# Patient Record
Sex: FEMALE | Race: WHITE | Employment: UNEMPLOYED | URBAN - METROPOLITAN AREA
[De-identification: names, ages, dates, MRNs, and addresses within clinical notes are randomized per-mention and may not be internally consistent; named-entity substitution may affect disease eponyms.]

---

## 2017-08-10 ENCOUNTER — OFFICE VISIT (OUTPATIENT)
Dept: INTERNAL MEDICINE CLINIC | Age: 52
End: 2017-08-10

## 2017-08-10 VITALS
DIASTOLIC BLOOD PRESSURE: 60 MMHG | BODY MASS INDEX: 21.33 KG/M2 | HEART RATE: 72 BPM | HEIGHT: 65 IN | WEIGHT: 128 LBS | SYSTOLIC BLOOD PRESSURE: 98 MMHG

## 2017-08-10 DIAGNOSIS — H00.025 HORDEOLUM INTERNUM OF LEFT LOWER EYELID: Primary | ICD-10-CM

## 2017-08-10 PROBLEM — J03.90 LINGUAL TONSILLITIS: Status: ACTIVE | Noted: 2017-08-10

## 2017-08-10 PROBLEM — J45.20 MILD INTERMITTENT ASTHMA WITHOUT COMPLICATION: Status: ACTIVE | Noted: 2017-08-10

## 2017-08-10 PROBLEM — L70.0 ACNE VULGARIS: Status: ACTIVE | Noted: 2017-08-10

## 2017-08-10 PROBLEM — F17.210 CIGARETTE NICOTINE DEPENDENCE WITHOUT COMPLICATION: Status: ACTIVE | Noted: 2017-08-10

## 2017-08-10 PROBLEM — M79.7 FIBROMYALGIA: Status: ACTIVE | Noted: 2017-08-10

## 2017-08-10 PROBLEM — E78.00 HYPERCHOLESTEROLEMIA: Status: ACTIVE | Noted: 2017-08-10

## 2017-08-10 PROBLEM — K21.9 GERD WITHOUT ESOPHAGITIS: Status: ACTIVE | Noted: 2017-08-10

## 2017-08-10 PROBLEM — F41.0 SEVERE ANXIETY WITH PANIC: Status: ACTIVE | Noted: 2017-08-10

## 2017-08-10 PROBLEM — Z78.0 MENOPAUSE: Status: ACTIVE | Noted: 2017-08-10

## 2017-08-10 RX ORDER — AZITHROMYCIN 250 MG/1
250 TABLET, FILM COATED ORAL SEE ADMIN INSTRUCTIONS
Qty: 6 TAB | Refills: 0 | Status: SHIPPED | OUTPATIENT
Start: 2017-08-10 | End: 2017-10-02 | Stop reason: ALTCHOICE

## 2017-08-10 RX ORDER — GLUCOSAMINE/CHONDR SU A SOD 750-600 MG
TABLET ORAL
COMMUNITY
End: 2020-12-10 | Stop reason: ALTCHOICE

## 2017-08-10 RX ORDER — PRAVASTATIN SODIUM 20 MG/1
20 TABLET ORAL
COMMUNITY
End: 2018-01-29 | Stop reason: SDUPTHER

## 2017-08-10 RX ORDER — ALBUTEROL SULFATE 90 UG/1
AEROSOL, METERED RESPIRATORY (INHALATION)
COMMUNITY
End: 2018-08-14 | Stop reason: SDUPTHER

## 2017-08-10 RX ORDER — ALPRAZOLAM 0.5 MG/1
0.5 TABLET ORAL
COMMUNITY
End: 2017-08-21 | Stop reason: SDUPTHER

## 2017-08-10 RX ORDER — PANTOPRAZOLE SODIUM 40 MG/1
40 TABLET, DELAYED RELEASE ORAL DAILY
COMMUNITY
End: 2019-11-06 | Stop reason: ALTCHOICE

## 2017-08-10 NOTE — PROGRESS NOTES
This note will not be viewable in [address] Subjective: The patient presents to the office today with complaints of left lower eyelid pain, swelling, redness. This is been ongoing for 8 days. She denies any trauma to the eye. She denies any redness of the sclera or eye drainage. She denies blurred vision. She denies any right-sided symptoms. Past Medical History:   Diagnosis Date    Acne vulgaris 8/10/2017    Cigarette nicotine dependence without complication 9/75/7784    Fibromyalgia 8/10/2017    GERD without esophagitis 8/10/2017    Hypercholesterolemia 8/10/2017    Lingual tonsillitis 8/10/2017    Menopause 8/10/2017    Mild intermittent asthma without complication 3/38/7090    Severe anxiety with panic 8/10/2017     Past Surgical History:   Procedure Laterality Date    IMPLANT BREAST SILICONE/EQ Bilateral 1893     Allergies   Allergen Reactions    Pcn [Penicillins] Hives    Sulfa (Sulfonamide Antibiotics) Hives     Current Outpatient Prescriptions   Medication Sig Dispense Refill    pantoprazole (PROTONIX) 40 mg tablet Take 40 mg by mouth daily.  pravastatin (PRAVACHOL) 20 mg tablet Take 20 mg by mouth nightly.  ALPRAZolam (XANAX) 0.5 mg tablet Take 0.5 mg by mouth three (3) times daily as needed for Anxiety.  albuterol (PROAIR HFA) 90 mcg/actuation inhaler Take  by inhalation.  Biotin 2,500 mcg cap Take  by mouth.  azithromycin (ZITHROMAX) 250 mg tablet Take 1 Tab by mouth See Admin Instructions.  6 Tab 0     Social History     Social History    Marital status:      Spouse name: N/A    Number of children: N/A    Years of education: N/A     Social History Main Topics    Smoking status: Current Every Day Smoker     Packs/day: 0.50    Smokeless tobacco: Never Used    Alcohol use 7.2 oz/week     12 Cans of beer per week    Drug use: No    Sexual activity: Not Asked     Other Topics Concern    None     Social History Narrative     Family History Problem Relation Age of Onset    Cancer Mother      skin, throat    Arthritis-osteo Mother     Anxiety Mother     No Known Problems Father        Review of Systems:  GEN: no weight loss, weight gain, fatigue or night sweats  HEENT: Left lower eyelid swelling, redness and pain. ROS otherwise negative      Objective:     Visit Vitals    BP 98/60 (BP 1 Location: Right arm, BP Patient Position: Sitting)    Pulse 72    Ht 5' 4.5\" (1.638 m)    Wt 128 lb (58.1 kg)    BMI 21.63 kg/m2     Body mass index is 21.63 kg/(m^2). General:   alert, cooperative and no distress   Eyes: conjunctivae/sclerae clear. PERRL, EOM's intact. Left lower lid swollen and red. Internal stye present with lower eyelid eversion. Physical exam otherwise negative         Assessment/Plan:     Diagnoses and all orders for this visit:    Hordeolum internum of left lower eyelid    Other orders  -     azithromycin (ZITHROMAX) 250 mg tablet; Take 1 Tab by mouth See Admin Instructions. , Normal, Disp-6 Tab, R-0        Other instructions: The patient will start warm compresses to the lower eyelid 4 times a day for 5-10 minutes. She is allergic to penicillin and we will avoid cephalosporins as well. Have given her a Z-William to treat the above problem. Refer to ophthalmology should she not improve over the next 5-10 days. Follow-up Disposition:  Return if symptoms worsen or fail to improve.     Matteo Juarez MD

## 2017-08-10 NOTE — PATIENT INSTRUCTIONS
Styes and Chalazia: Care Instructions  Your Care Instructions    Styes and chalazia (say \"rpa-DBG-gyi-uh\") are both conditions that can cause swelling of the eyelid. A stye is an infection in the root of an eyelash. The infection causes a tender red lump on the edge of the eyelid. The infection can spread until the whole eyelid becomes red and inflamed. Styes usually break open, and a tiny amount of pus drains. They usually clear up on their own in about a week, but they sometimes need treatment with antibiotics. A chalazion is a lump or cyst in the eyelid (chalazion is singular; chalazia is plural). It is caused by swelling and inflammation of deep oil glands inside the eyelid. Chalazia are usually not infected. They can take a few months to heal.  If a chalazion becomes more swollen and painful or does not go away, you may need to have it drained by your doctor. Follow-up care is a key part of your treatment and safety. Be sure to make and go to all appointments, and call your doctor if you are having problems. It's also a good idea to know your test results and keep a list of the medicines you take. How can you care for yourself at home? · Do not rub your eyes. Do not squeeze or try to open a stye or chalazion. · To help a stye or chalazion heal faster:  ¨ Put a warm, moist compress on your eye for 5 to 10 minutes, 3 to 6 times a day. Heat often brings a stye to a point where it drains on its own. Keep in mind that warm compresses will often increase swelling a little at first.  ¨ Do not use hot water or heat a wet cloth in a microwave oven. The compress may get too hot and can burn the eyelid. · Always wash your hands before and after you use a compress or touch your eyes. · If the doctor gave you antibiotic drops or ointment, use the medicine exactly as directed. Use the medicine for as long as instructed, even if your eye starts to feel better.   · To put in eyedrops or ointment:  ¨ Tilt your head back, and pull your lower eyelid down with one finger. ¨ Drop or squirt the medicine inside the lower lid. ¨ Close your eye for 30 to 60 seconds to let the drops or ointment move around. ¨ Do not touch the ointment or dropper tip to your eyelashes or any other surface. · Do not wear eye makeup or contact lenses until the stye or chalazion heals. · Do not share towels, pillows, or washcloths while you have a stye. When should you call for help? Call your doctor now or seek immediate medical care if:  · You have pain in your eye. · You have a change in vision or loss of vision. · Redness and swelling get much worse. Watch closely for changes in your health, and be sure to contact your doctor if:  · Your stye does not get better in 1 week. · Your chalazion does not start to get better after several weeks. Where can you learn more? Go to http://mishel-orville.info/. Enter V988 in the search box to learn more about \"Styes and Chalazia: Care Instructions. \"  Current as of: March 3, 2017  Content Version: 11.3  © 5913-4537 [employer]. Care instructions adapted under license by [employer] (which disclaims liability or warranty for this information). If you have questions about a medical condition or this instruction, always ask your healthcare professional. [address] any warranty or liability for your use of this information.

## 2017-08-10 NOTE — PROGRESS NOTES
Margot Liz is a 47 y.o. female presenting for Eye Problem and Ear Fullness  . 1. Have you been to the ER, urgent care clinic since your last visit? Hospitalized since your last visit? No    2. Have you seen or consulted any other health care providers outside of the [address] [place] [address] since your last visit? Include any pap smears or colon screening. No    No flowsheet data found. No flowsheet data found. PHQ over the last two weeks 8/10/2017   Little interest or pleasure in doing things Several days   Feeling down, depressed or hopeless Several days   Total Score PHQ 2 2       There are no discontinued medications.

## 2017-08-10 NOTE — MR AVS SNAPSHOT
Visit Information Date & Time Provider Department Dept. Phone Encounter #  
 8/10/2017  2:50 PM Deepak Dailey MD [facility] [id number] Follow-up Instructions Return if symptoms worsen or fail to improve. Upcoming Health Maintenance Date Due Pneumococcal 19-64 Medium Risk (1 of 1 - PPSV23) 4/5/1985 DTaP/Tdap/Td series (1 - Tdap) 4/5/1987 PAP AKA CERVICAL CYTOLOGY 4/5/1987 FOBT Q 1 YEAR AGE 50-75 4/5/2016 INFLUENZA AGE 9 TO ADULT 8/1/2017 BREAST CANCER SCRN MAMMOGRAM 10/24/2018 Allergies as of 8/10/2017  Review Complete On: 8/10/2017 By: Deepak Dailey MD  
  
 Severity Noted Reaction Type Reactions Pcn [Penicillins]  08/10/2017    Hives Sulfa (Sulfonamide Antibiotics)  08/10/2017    Hives Current Immunizations  Never Reviewed No immunizations on file. Not reviewed this visit You Were Diagnosed With   
  
 Codes Comments Hordeolum internum of left lower eyelid    -  Primary ICD-10-CM: H00.025 ICD-9-CM: 373.12 Vitals BP Pulse Height(growth percentile) Weight(growth percentile) BMI Smoking Status 98/60 (BP 1 Location: Right arm, BP Patient Position: Sitting) 72 5' 4.5\" (1.638 m) 128 lb (58.1 kg) 21.63 kg/m2 Current Every Day Smoker BMI and BSA Data Body Mass Index Body Surface Area  
 21.63 kg/m 2 1.63 m 2 Preferred Pharmacy Pharmacy Name Phone [facility] [phone] Your Updated Medication List  
  
   
This list is accurate as of: 8/10/17  3:28 PM.  Always use your most recent med list.  
  
  
  
  
 azithromycin 250 mg tablet Commonly known as:  Brendolyn Fredy Take 1 Tab by mouth See Admin Instructions. Biotin 2,500 mcg Cap Take  by mouth.  
  
 pravastatin 20 mg tablet Commonly known as:  PRAVACHOL Take 20 mg by mouth nightly. PROAIR HFA 90 mcg/actuation inhaler Generic drug:  albuterol Take  by inhalation. PROTONIX 40 mg tablet Generic drug:  pantoprazole Take 40 mg by mouth daily. XANAX 0.5 mg tablet Generic drug:  ALPRAZolam  
Take 0.5 mg by mouth three (3) times daily as needed for Anxiety. Prescriptions Sent to Pharmacy Refills  
 azithromycin (ZITHROMAX) 250 mg tablet 0 Sig: Take 1 Tab by mouth See Admin Instructions. Class: Normal  
 Pharmacy: [facility] #: [phone] Route: Oral  
  
Follow-up Instructions Return if symptoms worsen or fail to improve. Patient Instructions Styes and Chalazia: Care Instructions Your Care Instructions Styes and chalazia (say \"ezd-CCD-pfd-\") are both conditions that can cause swelling of the eyelid. A stye is an infection in the root of an eyelash. The infection causes a tender red lump on the edge of the eyelid. The infection can spread until the whole eyelid becomes red and inflamed. Styes usually break open, and a tiny amount of pus drains. They usually clear up on their own in about a week, but they sometimes need treatment with antibiotics. A chalazion is a lump or cyst in the eyelid (chalazion is singular; chalazia is plural). It is caused by swelling and inflammation of deep oil glands inside the eyelid. Chalazia are usually not infected. They can take a few months to heal. 
If a chalazion becomes more swollen and painful or does not go away, you may need to have it drained by your doctor. Follow-up care is a key part of your treatment and safety. Be sure to make and go to all appointments, and call your doctor if you are having problems. It's also a good idea to know your test results and keep a list of the medicines you take. How can you care for yourself at home? · Do not rub your eyes. Do not squeeze or try to open a stye or chalazion. · To help a stye or chalazion heal faster: ¨ Put a warm, moist compress on your eye for 5 to 10 minutes, 3 to 6 times a day. Heat often brings a stye to a point where it drains on its own. Keep in mind that warm compresses will often increase swelling a little at first. 
¨ Do not use hot water or heat a wet cloth in a microwave oven. The compress may get too hot and can burn the eyelid. · Always wash your hands before and after you use a compress or touch your eyes. · If the doctor gave you antibiotic drops or ointment, use the medicine exactly as directed. Use the medicine for as long as instructed, even if your eye starts to feel better. · To put in eyedrops or ointment: ¨ Tilt your head back, and pull your lower eyelid down with one finger. ¨ Drop or squirt the medicine inside the lower lid. ¨ Close your eye for 30 to 60 seconds to let the drops or ointment move around. ¨ Do not touch the ointment or dropper tip to your eyelashes or any other surface. · Do not wear eye makeup or contact lenses until the stye or chalazion heals. · Do not share towels, pillows, or washcloths while you have a stye. When should you call for help? Call your doctor now or seek immediate medical care if: 
· You have pain in your eye. · You have a change in vision or loss of vision. · Redness and swelling get much worse. Watch closely for changes in your health, and be sure to contact your doctor if: 
· Your stye does not get better in 1 week. · Your chalazion does not start to get better after several weeks. Where can you learn more? Go to http://mishel-orville.info/. Enter I392 in the search box to learn more about \"Styes and Chalazia: Care Instructions. \" Current as of: March 3, 2017 Content Version: 11.3 © 0501-3516 [employer].  Care instructions adapted under license by [employer] (which disclaims liability or warranty for this information). If you have questions about a medical condition or this instruction, always ask your healthcare professional. [address] any warranty or liability for your use of this information. Introducing [facility]! [facility] introduces ClearMomentum patient portal. Now you can access parts of your medical record, email your doctor's office, and request medication refills online. 1. In your internet browser, go to [email or web address] 2. Click on the First Time User? Click Here link in the Sign In box. You will see the New Member Sign Up page. 3. Enter your ClearMomentum Access Code exactly as it appears below. You will not need to use this code after youve completed the sign-up process. If you do not sign up before the expiration date, you must request a new code. · ClearMomentum Access Code: [email or web address] Expires: 11/8/2017  2:48 PM 
 
4. Enter the last four digits of your Social Security Number (xxxx) and Date of Birth (mm/dd/yyyy) as indicated and click Submit. You will be taken to the next sign-up page. 5. Create a ClearMomentum ID. This will be your ClearMomentum login ID and cannot be changed, so think of one that is secure and easy to remember. 6. Create a ClearMomentum password. You can change your password at any time. 7. Enter your Password Reset Question and Answer. This can be used at a later time if you forget your password. 8. Enter your e-mail address. You will receive e-mail notification when new information is available in [address] 9. Click Sign Up. You can now view and download portions of your medical record. 10. Click the Download Summary menu link to download a portable copy of your medical information. If you have questions, please visit the Frequently Asked Questions section of the ClearMomentum website. Remember, ClearMomentum is NOT to be used for urgent needs. For medical emergencies, dial 911. Now available from your iPhone and Android! Please provide this summary of care documentation to your next provider. Your primary care clinician is listed as ELFEGO Vidal. If you have any questions after today's visit, please call [phone].

## 2017-08-22 RX ORDER — ALPRAZOLAM 0.5 MG/1
TABLET ORAL
Qty: 90 TAB | Refills: 0 | Status: SHIPPED | OUTPATIENT
Start: 2017-08-22 | End: 2017-10-02 | Stop reason: SDUPTHER

## 2017-08-22 NOTE — TELEPHONE ENCOUNTER
Requested Prescriptions     Pending Prescriptions Disp Refills    ALPRAZolam (XANAX) 0.5 mg tablet [Pharmacy Med Name: ALPRAZOLAM 0.5MG] 90 Tab 0     Sig: TAKE 1 TABLET BY MOUTH THREE TIMES DAILY AS NEEDED       Last Refill: 7/17/17  Last visit:5/31/17

## 2017-10-02 ENCOUNTER — OFFICE VISIT (OUTPATIENT)
Dept: INTERNAL MEDICINE CLINIC | Age: 52
End: 2017-10-02

## 2017-10-02 VITALS
SYSTOLIC BLOOD PRESSURE: 116 MMHG | TEMPERATURE: 97.8 F | HEIGHT: 65 IN | WEIGHT: 132.4 LBS | BODY MASS INDEX: 22.06 KG/M2 | DIASTOLIC BLOOD PRESSURE: 78 MMHG

## 2017-10-02 DIAGNOSIS — B02.9 HERPES ZOSTER WITHOUT COMPLICATION: Primary | ICD-10-CM

## 2017-10-02 DIAGNOSIS — F41.0 SEVERE ANXIETY WITH PANIC: ICD-10-CM

## 2017-10-02 PROBLEM — F41.9 ANXIETY: Status: ACTIVE | Noted: 2017-10-02

## 2017-10-02 RX ORDER — ALPRAZOLAM 0.5 MG/1
0.5 TABLET ORAL
Qty: 90 TAB | Refills: 0 | Status: SHIPPED | OUTPATIENT
Start: 2017-10-02 | End: 2017-11-15 | Stop reason: SDUPTHER

## 2017-10-02 RX ORDER — DOXYCYCLINE 100 MG/1
100 CAPSULE ORAL 2 TIMES DAILY
COMMUNITY
End: 2017-10-02 | Stop reason: ALTCHOICE

## 2017-10-02 RX ORDER — FAMCICLOVIR 500 MG/1
500 TABLET ORAL 3 TIMES DAILY
Qty: 21 TAB | Refills: 0 | Status: SHIPPED | OUTPATIENT
Start: 2017-10-02 | End: 2017-10-09

## 2017-10-02 RX ORDER — PREDNISONE 10 MG/1
TABLET ORAL
Qty: 30 TAB | Refills: 0 | Status: SHIPPED | OUTPATIENT
Start: 2017-10-02 | End: 2018-04-23 | Stop reason: ALTCHOICE

## 2017-10-02 NOTE — PROGRESS NOTES
This note will not be viewable in [address] Subjective:     Ms. Verna Martinez presents to the office today with complaints of a painful rash which is broken out over her right scapula. Pain began several days before the rash occurred. She has had no fevers no chills or other constitutional symptoms. She notes radicular discomfort that occurs through the rash. Past Medical History:   Diagnosis Date    Acne vulgaris 8/10/2017    Anxiety 10/2/2017    Cigarette nicotine dependence without complication 8/63/9477    Fibromyalgia 8/10/2017    GERD without esophagitis 8/10/2017    Hypercholesterolemia 8/10/2017    Lingual tonsillitis 8/10/2017    Menopause 8/10/2017    Mild intermittent asthma without complication 8/80/7587    Severe anxiety with panic 8/10/2017     Past Surgical History:   Procedure Laterality Date    IMPLANT BREAST SILICONE/EQ Bilateral 5980     Allergies   Allergen Reactions    Pcn [Penicillins] Hives    Sulfa (Sulfonamide Antibiotics) Hives     Current Outpatient Prescriptions   Medication Sig Dispense Refill    ERGOCALCIFEROL, VITAMIN D2, (VITAMIN D2 PO) Take  by mouth.  famciclovir (FAMVIR) 500 mg tablet Take 1 Tab by mouth three (3) times daily for 7 days. 21 Tab 0    predniSONE (DELTASONE) 10 mg tablet 5 tablets day for days 1 through 4, then 4 tablets on day 5, then 3 tablets on day 6, then 2 tablets on day 7, then 1 tablet on day 8. 30 Tab 0    ALPRAZolam (XANAX) 0.5 mg tablet Take 1 Tab by mouth three (3) times daily as needed for Anxiety. Max Daily Amount: 1.5 mg. 90 Tab 0    pantoprazole (PROTONIX) 40 mg tablet Take 40 mg by mouth daily.  pravastatin (PRAVACHOL) 20 mg tablet Take 20 mg by mouth nightly.  albuterol (PROAIR HFA) 90 mcg/actuation inhaler Take  by inhalation.  Biotin 2,500 mcg cap Take  by mouth.        Social History     Social History    Marital status:      Spouse name: N/A    Number of children: N/A    Years of education: N/A Social History Main Topics    Smoking status: Current Every Day Smoker     Packs/day: 0.50    Smokeless tobacco: Never Used    Alcohol use 7.2 oz/week     12 Cans of beer per week    Drug use: No    Sexual activity: Not Asked     Other Topics Concern    None     Social History Narrative     Family History   Problem Relation Age of Onset    Cancer Mother      skin, throat    Arthritis-osteo Mother     Anxiety Mother     No Known Problems Father        Review of Systems:  GEN: no weight loss, weight gain, fatigue or night sweats  CV: no PND, orthopnea, or palpitations  Resp: no dyspnea on exertion, no cough  Abd: no nausea, vomiting or diarrhea  EXT: denies edema, claudication  Derm: Painful rash over right scapula only. No fever or other constitutional symptoms present  Neurological ROS: no TIA or stroke symptoms  ROS otherwise negative      Objective:     Visit Vitals    /78 (BP 1 Location: Right arm, BP Patient Position: Sitting)    Temp 97.8 °F (36.6 °C) (Oral)    Ht 5' 4.5\" (1.638 m)    Wt 132 lb 6.4 oz (60.1 kg)    BMI 22.38 kg/m2     Body mass index is 22.38 kg/(m^2). General:   alert, cooperative and no distress   Derm: No edema or cyanosis. Herpetic crop present over right scapula with no rash extending to the right flank chest or breast areas. Vesicles present. No signs of cellulitis. Neuro: ..alert, oriented x3,speech normal in context and clarity, cranial nerves II-XII intact,motor strength: full proximally and distally,gait: normal  reflexes: full and symmetric     Physical exam otherwise negative         Assessment/Plan:     Diagnoses and all orders for this visit:    Herpes zoster without complication    Severe anxiety with panic  -     ALPRAZolam (XANAX) 0.5 mg tablet; Take 1 Tab by mouth three (3) times daily as needed for Anxiety. Max Daily Amount: 1.5 mg., PrintGeneric For:XANAX  0.5MGDisp-90 Tab, R-0    Other orders  -     famciclovir (FAMVIR) 500 mg tablet;  Take 1 Tab by mouth three (3) times daily for 7 days. , Normal, Disp-21 Tab, R-0  -     predniSONE (DELTASONE) 10 mg tablet; 5 tablets day for days 1 through 4, then 4 tablets on day 5, then 3 tablets on day 6, then 2 tablets on day 7, then 1 tablet on day 8., Normal, Disp-30 Tab, R-0        Other instructions:   Start steroid taper and antiviral medication    Offered to prescribe gabapentin for help with herpetic pain but she declines. Have refilled her Xanax today for her severe anxiety and panic. Follow-up if there is any worsening    Follow-up Disposition:  Return if symptoms worsen or fail to improve.     Fred Wang MD

## 2017-10-02 NOTE — MR AVS SNAPSHOT
Visit Information Date & Time Provider Department Dept. Phone Encounter #  
 10/2/2017  1:00 PM Mark Slater MD [facility] [id number] Follow-up Instructions Return if symptoms worsen or fail to improve. Upcoming Health Maintenance Date Due Pneumococcal 19-64 Medium Risk (1 of 1 - PPSV23) 4/5/1985 DTaP/Tdap/Td series (1 - Tdap) 4/5/1987 PAP AKA CERVICAL CYTOLOGY 4/5/1987 FOBT Q 1 YEAR AGE 50-75 4/5/2016 INFLUENZA AGE 9 TO ADULT 8/1/2017 BREAST CANCER SCRN MAMMOGRAM 10/24/2018 Allergies as of 10/2/2017  Review Complete On: 10/2/2017 By: Mark Slater MD  
  
 Severity Noted Reaction Type Reactions Pcn [Penicillins]  08/10/2017    Hives Sulfa (Sulfonamide Antibiotics)  08/10/2017    Hives Current Immunizations  Never Reviewed No immunizations on file. Not reviewed this visit You Were Diagnosed With   
  
 Codes Comments Herpes zoster without complication    -  Primary ICD-10-CM: B02.9 ICD-9-CM: 053.9 Severe anxiety with panic     ICD-10-CM: F41.0 ICD-9-CM: 300.01 Vitals BP Temp Height(growth percentile) 116/78 (BP 1 Location: Right arm, BP Patient Position: Sitting) 97.8 °F (36.6 °C) (Oral) 5' 4.5\" (1.638 m) Weight(growth percentile) BMI Smoking Status 132 lb 6.4 oz (60.1 kg) 22.38 kg/m2 Current Every Day Smoker BMI and BSA Data Body Mass Index Body Surface Area  
 22.38 kg/m 2 1.65 m 2 Preferred Pharmacy Pharmacy Name Phone [facility] [phone] Your Updated Medication List  
  
   
This list is accurate as of: 10/2/17  1:13 PM.  Always use your most recent med list.  
  
  
  
  
 ALPRAZolam 0.5 mg tablet Commonly known as:  Cecillia Mariangel Take 1 Tab by mouth three (3) times daily as needed for Anxiety. Max Daily Amount: 1.5 mg.  
  
 Biotin 2,500 mcg Cap Take  by mouth. famciclovir 500 mg tablet Commonly known as:  [facility] Take 1 Tab by mouth three (3) times daily for 7 days. pravastatin 20 mg tablet Commonly known as:  PRAVACHOL Take 20 mg by mouth nightly. predniSONE 10 mg tablet Commonly known as:  DELTASONE  
5 tablets day for days 1 through 4, then 4 tablets on day 5, then 3 tablets on day 6, then 2 tablets on day 7, then 1 tablet on day 8. PROAIR HFA 90 mcg/actuation inhaler Generic drug:  albuterol Take  by inhalation. PROTONIX 40 mg tablet Generic drug:  pantoprazole Take 40 mg by mouth daily. VITAMIN D2 PO Take  by mouth. Prescriptions Printed Refills ALPRAZolam (XANAX) 0.5 mg tablet 0 Sig: Take 1 Tab by mouth three (3) times daily as needed for Anxiety. Max Daily Amount: 1.5 mg.  
 Class: Print Route: Oral  
  
Prescriptions Sent to Pharmacy Refills  
 famciclovir (FAMVIR) 500 mg tablet 0 Sig: Take 1 Tab by mouth three (3) times daily for 7 days. Class: Normal  
 Pharmacy: [facility]  Ph #: [phone] Route: Oral  
 predniSONE (DELTASONE) 10 mg tablet 0 Si tablets day for days 1 through 4, then 4 tablets on day 5, then 3 tablets on day 6, then 2 tablets on day 7, then 1 tablet on day 8. Class: Normal  
 [facility]acy: [facility]  Ph #: [phone] Follow-up Instructions Return if symptoms worsen or fail to improve. Patient Instructions Shingles: Care Instructions Your Care Instructions Shingles (herpes zoster) causes pain and a blistered rash. The rash can appear anywhere on the body but will be on only one side of the body, the left or right. It will be in a band, a strip, or a small area. The pain can be very severe.  Shingles can also cause tingling or itching in the area of the rash. The blisters scab over after a few days and heal in 2 to 4 weeks. Medicines can help you feel better and may help prevent more serious problems caused by shingles. Shingles is caused by the same virus that causes chickenpox. When you have chickenpox, the virus gets into your nerve roots and stays there (becomes dormant) long after you get over the chickenpox. If the virus becomes active again, it can cause shingles. Follow-up care is a key part of your treatment and safety. Be sure to make and go to all appointments, and call your doctor if you are having problems. It's also a good idea to know your test results and keep a list of the medicines you take. How can you care for yourself at home? · Be safe with medicines. Take your medicines exactly as prescribed. Call your doctor if you think you are having a problem with your medicine. Antiviral medicine helps you get better faster. · Try not to scratch or pick at the blisters. They will crust over and fall off on their own if you leave them alone. · Put cool, wet cloths on the area to relieve pain and itching. You can also use calamine lotion. Try not to use so much lotion that it cakes and is hard to get off. · Put cornstarch or baking soda on the sores to help dry them out so they heal faster. · Do not use thick ointment, such as petroleum jelly, on the sores. This will keep them from drying and healing. · To help remove loose crusts, soak them in tap water. This can help decrease oozing, and dry and soothe the skin. · Take an over-the-counter pain medicine, such as acetaminophen (Tylenol), ibuprofen (Advil, Motrin), or naproxen (Aleve). Read and follow all instructions on the label. · Avoid close contact with people until the blisters have healed. It is very important for you to avoid contact with anyone who has never had chickenpox or the chickenpox vaccine.  Pregnant women, young babies, and anyone else who has a hard time fighting infection (such as someone with HIV, diabetes, or cancer) is especially at risk. When should you call for help? Call your doctor now or seek immediate medical care if: 
· You have a new or higher fever. · You have a severe headache and a stiff neck. · You lose the ability to think clearly. · The rash spreads to your forehead, nose, eyes, or eyelids. · You have eye pain, or your vision gets worse. · You have new pain in your face, or you cannot move the muscles in your face. · Blisters spread to new parts of your body. Watch closely for changes in your health, and be sure to contact your doctor if: · The rash has not healed after 2 to 4 weeks. · You still have pain after the rash has healed. Where can you learn more? Go to http://mishel-orville.info/. Deborah Cavazos in the search box to learn more about \"Shingles: Care Instructions. \" Current as of: March 3, 2017 Content Version: 11.3 © 7938-9285 [employer]. Care instructions adapted under license by [employer] (which disclaims liability or warranty for this information). If you have questions about a medical condition or this instruction, always ask your healthcare professional. [address] any warranty or liability for your use of this information. Introducing [facility]! Charmaine Torres introduces Dextrys patient portal. Now you can access parts of your medical record, email your doctor's office, and request medication refills online. 1. In your internet browser, go to [email or web address] 2. Click on the First Time User? Click Here link in the Sign In box. You will see the New Member Sign Up page. 3. Enter your Dextrys Access Code exactly as it appears below. You will not need to use this code after youve completed the sign-up process.  If you do not sign up before the expiration date, you must request a new code. 
 
· KidBook Access Code: [email or web address] Expires: 11/8/2017  2:48 PM 
 
4. Enter the last four digits of your Social Security Number (xxxx) and Date of Birth (mm/dd/yyyy) as indicated and click Submit. You will be taken to the next sign-up page. 5. Create a KidBook ID. This will be your KidBook login ID and cannot be changed, so think of one that is secure and easy to remember. 6. Create a KidBook password. You can change your password at any time. 7. Enter your Password Reset Question and Answer. This can be used at a later time if you forget your password. 8. Enter your e-mail address. You will receive e-mail notification when new information is available in [address] 9. Click Sign Up. You can now view and download portions of your medical record. 10. Click the Download Summary menu link to download a portable copy of your medical information. If you have questions, please visit the Frequently Asked Questions section of the KidBook website. Remember, KidBook is NOT to be used for urgent needs. For medical emergencies, dial 911. Now available from your iPhone and Android! Please provide this summary of care documentation to your next provider. Your primary care clinician is listed as ELFEGO Gerard 21. If you have any questions after today's visit, please call [phone].

## 2017-10-02 NOTE — PATIENT INSTRUCTIONS

## 2017-10-02 NOTE — PROGRESS NOTES
Darrian Jama is a 47 y.o. female presenting for Sinus Infection  . 1. Have you been to the ER, urgent care clinic since your last visit? Hospitalized since your last visit? No    2. Have you seen or consulted any other health care providers outside of the [address] [place] [address] since your last visit? Include any pap smears or colon screening. No    No flowsheet data found. No flowsheet data found. PHQ over the last two weeks 8/10/2017   Little interest or pleasure in doing things Several days   Feeling down, depressed or hopeless Several days   Total Score PHQ 2 2       There are no discontinued medications.

## 2017-10-31 ENCOUNTER — TELEPHONE (OUTPATIENT)
Dept: INTERNAL MEDICINE CLINIC | Age: 52
End: 2017-10-31

## 2017-10-31 NOTE — TELEPHONE ENCOUNTER
Shingles pain better, but still has raised itchy areas. Was not able to take prednisone- caused stomach upset/diarrhea. Finished Famvir.

## 2017-11-15 DIAGNOSIS — F41.0 SEVERE ANXIETY WITH PANIC: ICD-10-CM

## 2017-11-15 RX ORDER — ALPRAZOLAM 0.5 MG/1
0.5 TABLET ORAL
Qty: 90 TAB | Refills: 0 | OUTPATIENT
Start: 2017-11-15 | End: 2017-12-18 | Stop reason: SDUPTHER

## 2017-11-15 NOTE — TELEPHONE ENCOUNTER
Last visit:10/2/2017      Requested Prescriptions     Pending Prescriptions Disp Refills    ALPRAZolam (XANAX) 0.5 mg tablet 90 Tab 0     Sig: Take 1 Tab by mouth three (3) times daily as needed for Anxiety.  Max Daily Amount: 1.5 mg.

## 2017-12-18 DIAGNOSIS — F41.0 SEVERE ANXIETY WITH PANIC: ICD-10-CM

## 2017-12-18 RX ORDER — ALPRAZOLAM 0.5 MG/1
TABLET ORAL
Qty: 90 TAB | Refills: 0 | Status: SHIPPED | OUTPATIENT
Start: 2017-12-18 | End: 2018-01-29 | Stop reason: SDUPTHER

## 2017-12-18 NOTE — TELEPHONE ENCOUNTER
Requested Prescriptions     Pending Prescriptions Disp Refills    ALPRAZolam (XANAX) 0.5 mg tablet [Pharmacy Med Name: *ALPRAZOLAM 0.5MG] 90 Tab 0     Sig: TAKE 1 TABLET THREE TIMES DAILY IF NEEDED FOR ANXIETY       Last Refill: 11-15-17  Last visit:10/2/2017

## 2018-01-29 DIAGNOSIS — F41.0 SEVERE ANXIETY WITH PANIC: ICD-10-CM

## 2018-01-29 RX ORDER — ALPRAZOLAM 0.5 MG/1
0.5 TABLET ORAL
Qty: 90 TAB | Refills: 0 | Status: SHIPPED | OUTPATIENT
Start: 2018-01-29 | End: 2018-03-05 | Stop reason: SDUPTHER

## 2018-01-29 RX ORDER — PRAVASTATIN SODIUM 20 MG/1
TABLET ORAL
Qty: 30 TAB | Refills: 6 | Status: SHIPPED | OUTPATIENT
Start: 2018-01-29 | End: 2018-09-05 | Stop reason: SDUPTHER

## 2018-02-06 ENCOUNTER — HOSPITAL ENCOUNTER (OUTPATIENT)
Dept: MAMMOGRAPHY | Age: 53
Discharge: HOME | End: 2018-02-06
Attending: INTERNAL MEDICINE
Payer: COMMERCIAL

## 2018-02-06 DIAGNOSIS — Z12.39 SCREENING BREAST EXAMINATION: ICD-10-CM

## 2018-02-06 PROCEDURE — 77067 SCR MAMMO BI INCL CAD: CPT

## 2018-03-05 DIAGNOSIS — F41.0 SEVERE ANXIETY WITH PANIC: ICD-10-CM

## 2018-03-05 RX ORDER — ALPRAZOLAM 0.5 MG/1
0.5 TABLET ORAL
Qty: 90 TAB | Refills: 0 | Status: SHIPPED | OUTPATIENT
Start: 2018-03-05 | End: 2018-04-03 | Stop reason: SDUPTHER

## 2018-03-05 NOTE — TELEPHONE ENCOUNTER
Requested Prescriptions     Pending Prescriptions Disp Refills    ALPRAZolam (XANAX) 0.5 mg tablet 90 Tab 0     Sig: Take 1 Tab by mouth three (3) times daily as needed for Anxiety. Max Daily Amount: 1.5 mg.        Last Refill: 1-29-18  Last visit:10-2-17

## 2018-04-03 DIAGNOSIS — F41.0 SEVERE ANXIETY WITH PANIC: ICD-10-CM

## 2018-04-03 RX ORDER — ALPRAZOLAM 0.5 MG/1
0.5 TABLET ORAL
Qty: 90 TAB | Refills: 0 | Status: SHIPPED | OUTPATIENT
Start: 2018-04-03 | End: 2018-05-10 | Stop reason: SDUPTHER

## 2018-04-03 NOTE — TELEPHONE ENCOUNTER
Requested Prescriptions     Pending Prescriptions Disp Refills    ALPRAZolam (XANAX) 0.5 mg tablet 90 Tab 0     Sig: Take 1 Tab by mouth three (3) times daily as needed for Anxiety. Max Daily Amount: 1.5 mg.        Last Refill: 3-5-18  Last visit:10-2-17

## 2018-04-23 ENCOUNTER — OFFICE VISIT (OUTPATIENT)
Dept: INTERNAL MEDICINE CLINIC | Age: 53
End: 2018-04-23

## 2018-04-23 VITALS
OXYGEN SATURATION: 97 % | SYSTOLIC BLOOD PRESSURE: 118 MMHG | HEART RATE: 75 BPM | HEIGHT: 65 IN | WEIGHT: 133 LBS | BODY MASS INDEX: 22.16 KG/M2 | DIASTOLIC BLOOD PRESSURE: 80 MMHG

## 2018-04-23 DIAGNOSIS — K21.9 GERD WITHOUT ESOPHAGITIS: ICD-10-CM

## 2018-04-23 DIAGNOSIS — Z00.00 ROUTINE PHYSICAL EXAMINATION: Primary | ICD-10-CM

## 2018-04-23 DIAGNOSIS — E78.00 HYPERCHOLESTEROLEMIA: ICD-10-CM

## 2018-04-23 DIAGNOSIS — Z12.11 COLON CANCER SCREENING: ICD-10-CM

## 2018-04-23 DIAGNOSIS — J45.20 MILD INTERMITTENT ASTHMA WITHOUT COMPLICATION: ICD-10-CM

## 2018-04-23 DIAGNOSIS — F41.0 SEVERE ANXIETY WITH PANIC: ICD-10-CM

## 2018-04-23 DIAGNOSIS — M79.7 FIBROMYALGIA: ICD-10-CM

## 2018-04-23 LAB
ALBUMIN SERPL-MCNC: 4.2 G/DL (ref 3.9–5.4)
ALKALINE PHOS POC: 52 U/L (ref 38–126)
ALT SERPL-CCNC: 27 U/L (ref 9–52)
AST SERPL-CCNC: 22 U/L (ref 14–36)
BACTERIA UA POCT, BACTPOCT: NORMAL
BILIRUB UR QL STRIP: NEGATIVE
BUN BLD-MCNC: 14 MG/DL (ref 7–17)
CALCIUM BLD-MCNC: 9.3 MG/DL (ref 8.4–10.2)
CASTS UA POCT: 0
CHLORIDE BLD-SCNC: 106 MMOL/L (ref 98–107)
CHOLEST SERPL-MCNC: 195 MG/DL (ref 0–200)
CK (CPK) POC: 79 U/L (ref 30–135)
CLUE CELLS, CLUEPOCT: NEGATIVE
CO2 POC: 27 MMOL/L (ref 22–32)
CREAT BLD-MCNC: 0.7 MG/DL (ref 0.7–1.2)
CRYSTALS UA POCT, CRYSPOCT: NEGATIVE
EGFR (POC): 99.6
EPITHELIAL CELLS POCT: NORMAL
GLUCOSE POC: 92 MG/DL (ref 65–105)
GLUCOSE UR-MCNC: NEGATIVE MG/DL
GRAN# POC: 4.1 K/UL (ref 2–7.8)
GRAN% POC: 66.8 % (ref 37–92)
HBA1C MFR BLD HPLC: 5.4 % (ref 4.5–5.7)
HCT VFR BLD CALC: 43 % (ref 37–51)
HDLC SERPL-MCNC: 72 MG/DL (ref 35–130)
HGB BLD-MCNC: 14.5 G/DL (ref 12–18)
KETONES P FAST UR STRIP-MCNC: NEGATIVE MG/DL
LDL CHOLESTEROL POC: 109.2 MG/DL (ref 0–130)
LY# POC: 1.7 K/UL (ref 0.6–4.1)
LY% POC: 28.3 % (ref 10–58.5)
MCH RBC QN: 32 PG (ref 26–32)
MCHC RBC-ENTMCNC: 33.6 G/DL (ref 30–36)
MCV RBC: 95 FL (ref 80–97)
MID #, POC: 0.2 K/UL (ref 0–1.8)
MID% POC: 4.9 % (ref 0.1–24)
MUCUS UA POCT, MUCPOCT: NORMAL
PH UR STRIP: 6 [PH] (ref 5–7)
PLATELET # BLD: 213 K/UL (ref 140–440)
POTASSIUM SERPL-SCNC: 4.5 MMOL/L (ref 3.6–5)
PROT SERPL-MCNC: 7.2 G/DL (ref 6.3–8.2)
PROT UR QL STRIP: NEGATIVE
RBC # BLD: 4.52 M/UL (ref 4.2–6.3)
RBC UA POCT, RBCPOCT: NORMAL
SODIUM SERPL-SCNC: 141 MMOL/L (ref 137–145)
SP GR UR STRIP: 1.02 (ref 1.01–1.02)
TCHOL/HDL RATIO (POC): 2.7 (ref 0–4)
TOTAL BILIRUBIN POC: 0.5 MG/DL (ref 0.2–1.3)
TRICH UA POCT, TRICHPOC: NEGATIVE
TRIGL SERPL-MCNC: 69 MG/DL (ref 0–200)
TSH BLD-ACNC: 0.92 UIU/ML (ref 0.4–4.2)
UA UROBILINOGEN AMB POC: NORMAL (ref 0.2–1)
URINALYSIS CLARITY POC: CLEAR
URINALYSIS COLOR POC: NORMAL
URINE BLOOD POC: NORMAL
URINE CULT COMMENT, POCT: NORMAL
URINE LEUKOCYTES POC: NORMAL
URINE NITRITES POC: NEGATIVE
VITAMIN D POC: 43.7 NG/ML (ref 30–96)
VLDLC SERPL CALC-MCNC: 13.8 MG/DL
WBC # BLD: 6 K/UL (ref 4.1–10.9)
WBC UA POCT, WBCPOCT: NORMAL
YEAST UA POCT, YEASTPOC: NEGATIVE

## 2018-04-23 NOTE — PATIENT INSTRUCTIONS
Colon Cancer Screening: Care Instructions  Your Care Instructions    Colorectal cancer occurs in the colon or rectum. That's the lower part of your digestive system. It is the second-leading cause of cancer deaths in the [place]. It often starts with small growths called polyps in the colon or rectum. Polyps are usually found with screening tests. Depending on the type of test, any polyps found may be removed during the tests. Colorectal cancer usually does not cause symptoms at first. But regular tests can help find it early, before it spreads and becomes harder to treat. Experts advise routine tests for colon cancer for people starting at age 48. And they advise people with a higher risk of colon cancer to get tested sooner. Talk with your doctor about when you should start testing. Discuss which tests you need. Follow-up care is a key part of your treatment and safety. Be sure to make and go to all appointments, and call your doctor if you are having problems. It's also a good idea to know your test results and keep a list of the medicines you take. What are the main screening tests for colon cancer? · Stool tests. These include the fecal immunochemical test (FIT) and the fecal occult blood test (FOBT). These tests check stool samples for signs of cancer. If your test is positive, you will need to have a colonoscopy. · Sigmoidoscopy. This test lets your doctor look at the lining of your rectum and the lowest part of your colon. Your doctor uses a lighted tube called a sigmoidoscope. This test can't find cancers or polyps in the upper part of your colon. In some cases, polyps that are found can be removed. But if your doctor finds polyps, you will need to have a colonoscopy to check the upper part of your colon. · Colonoscopy. This test lets your doctor look at the lining of your rectum and your entire colon. The doctor uses a thin, flexible tool called a colonoscope.  It can also be used to remove polyps or get a tissue sample (biopsy). What tests do you need? The following guidelines are for people age 48 and over who are not at high risk for colorectal cancer. You may have at least one of these tests as directed by your doctor. · Fecal immunochemical test (FIT) or fecal occult blood test (FOBT) every year  · Sigmoidoscopy every 5 years  · Colonoscopy every 10 years  If you are age 68 to 80, you can work with your doctor to decide if screening is a good option. If you are age 80 or older, your doctor will likely advise that screening is not helpful. Talk with your doctor about when you need to be tested. And discuss which tests are right for you. Your doctor may recommend earlier or more frequent testing if you:  · Have had colorectal cancer before. · Have had colon polyps. · Have symptoms of colorectal cancer. These include blood in your stool and changes in your bowel habits. · Have a parent, brother or sister, or child with colon polyps or colorectal cancer. · Have a bowel disease. This includes ulcerative colitis and Crohn's disease. · Have a rare polyp syndrome that runs in families, such as familial adenomatous polyposis (FAP). · Have had radiation treatments to the belly or pelvis. When should you call for help? Watch closely for changes in your health, and be sure to contact your doctor if:  ? · You have any changes in your bowel habits. ? · You have any problems. Where can you learn more? Go to http://mishel-orville.info/. Enter M541 in the search box to learn more about \"Colon Cancer Screening: Care Instructions. \"  Current as of: May 12, 2017  Content Version: 11.4  © 4638-9098 [employer]. Care instructions adapted under license by [employer] (which disclaims liability or warranty for this information).  If you have questions about a medical condition or this instruction, always ask your healthcare professional. Ashkan Elias disclaims any warranty or liability for your use of this information.

## 2018-04-23 NOTE — PROGRESS NOTES
Subjective: This note will not be viewable in [address] 47 y.o. female for annual Comprehensive personal healthcare examination. Mrs. Santos Mccoy is a 52-year-old  female who presents to the office today for complete checkup. Patient's medical history has included hypercholesterolemia for which she is on pravastatin therapy. Patient denies any joint pains or upset stomach. She has no history of ASCVD and denies any exertional chest pains or claudication. The patient has significant anxiety with panic. She takes Xanax on a regular basis which controls her symptoms. She has not had any severe outbreaks with this over the last 6 months. She denies any sedation with the Xanax or impairment. Her acid reflux is managed on PPI therapy. She denies any breakthrough heartburn and is had no dysphasia, early satiety or unexplained weight loss. She continues to smoke and has mild intermittent asthma. She does use an albuterol inhaler on a as needed basis. She has had some slight cough with the pollen season that we currently RN. She denies purulent sputum production. The patient is up to Pap testing which was done 4 months ago and mammography which was done 2 months ago. Both tests were negative. The patient has been offered colonoscopy in the past but her anxiety will not allow her to do this. History of present illness: This patient has multiple medical problems.   These include:   Patient Active Problem List   Diagnosis Code    Acne vulgaris L70.0    Severe anxiety with panic F41.0    Mild intermittent asthma without complication J65.93    Cigarette nicotine dependence without complication Z31.789    Fibromyalgia M79.7    GERD without esophagitis K21.9    Hypercholesterolemia E78.00    Lingual tonsillitis J03.90    Menopause Z78.0          Past Medical History:   Diagnosis Date    Acne vulgaris 8/10/2017    Anxiety 10/2/2017    Cigarette nicotine dependence without complication 8/29/7049    Fibromyalgia 8/10/2017    GERD without esophagitis 8/10/2017    Hypercholesterolemia 8/10/2017    Lingual tonsillitis 8/10/2017    Menopause 8/10/2017    Mild intermittent asthma without complication 6/67/2227    Severe anxiety with panic 8/10/2017     Past Surgical History:   Procedure Laterality Date    IMPLANT BREAST SILICONE/EQ Bilateral 6075    subglandular     Allergies   Allergen Reactions    Pcn [Penicillins] Hives    Sulfa (Sulfonamide Antibiotics) Hives     Current Outpatient Prescriptions   Medication Sig Dispense Refill    ALPRAZolam (XANAX) 0.5 mg tablet Take 1 Tab by mouth three (3) times daily as needed for Anxiety. Max Daily Amount: 1.5 mg. 90 Tab 0    pravastatin (PRAVACHOL) 20 mg tablet TAKE 1 (ONE) TABLET TABLET, ORAL, DAILY TO HELP LOWER CHOLESTEROL 30 Tab 6    ERGOCALCIFEROL, VITAMIN D2, (VITAMIN D2 PO) Take  by mouth.  pantoprazole (PROTONIX) 40 mg tablet Take 40 mg by mouth daily.  albuterol (PROAIR HFA) 90 mcg/actuation inhaler Take  by inhalation.  Biotin 2,500 mcg cap Take  by mouth.        Social History     Social History    Marital status:      Spouse name: N/A    Number of children: N/A    Years of education: N/A     Social History Main Topics    Smoking status: Current Every Day Smoker     Packs/day: 0.50    Smokeless tobacco: Never Used    Alcohol use 7.2 oz/week     12 Cans of beer per week    Drug use: No    Sexual activity: Not Asked     Other Topics Concern    None     Social History Narrative     Family History   Problem Relation Age of Onset    Cancer Mother      skin, throat    Arthritis-osteo Mother     Anxiety Mother     No Known Problems Father        Health Maintenance   Topic Date Due    Pneumococcal 19-64 Medium Risk (1 of 1 - PPSV23) 04/05/1985    DTaP/Tdap/Td series (1 - Tdap) 04/05/1987    PAP AKA CERVICAL CYTOLOGY  04/05/1987    FOBT Q 1 YEAR AGE 50-75  04/05/2016    Influenza Age 5 to Adult 08/01/2017    BREAST CANCER SCRN MAMMOGRAM  02/06/2020       Review of Systems  Constitutional:  She denies fever, weight loss, sweats or fatigue. HEENT:  No blurred or double vision, headache or dizziness. No difficulty with swallowing, taste, speech or smell. Respiratory:  No cough, wheezing or shortness of breath. No sputum production. Cardiac:  Denies chest pain, palpitations, unexplained indigestion, syncope, edema, PND or orthopnea. GI:  No changes in bowel movements, no abdominal pain, no bloating, anorexia, nausea, vomiting or heartburn. :  No frequency or dysuria. Denies incontinence. Extremities:  No joint pain, stiffness or swelling. Skin:  No recent rashes or mole changes. Neurological:  No numbness, tingling, burning paresthesias or loss of motor strength. No syncope, dizziness, frequent headaches or memory loss. ROS otherwise negative      Objective:     Vitals:    04/23/18 0931   BP: 118/80   Pulse: 75   SpO2: 97%   Weight: 133 lb (60.3 kg)   Height: 5' 4.5\" (1.638 m)   PainSc:   0 - No pain     Body mass index is 22.48 kg/(m^2). Physical Examination:   General Appearance:  Well-developed, well-nourished, no acute distress. Vision:  Deferred to ophthalmologist.       HEENT:    Ears:  The TMs and ear canals were clear. Eyes:  The pupillary responses were normal.  Extraocular muscle function intact. Lids and conjunctiva not injected. No conjunctiva redness or drainage. Pharynx:  Clear with teeth in good repair. No masses were noted. Neck:  Supple without thyromegaly or adenopathy. No JVD noted. No carotid bruits. Lungs:  Clear to auscultation and percussion. Cardiac:  Regular rate and rhythm without murmur. PMI not displaced. No gallop, rub or click. Abdomen:  Flat, soft, non-tender without palpable organomegaly or mass. No pulsatile mass was felt, and no bruit was heard. Bowel sounds were active.   Breasts:  Deferred to GYN  GYN: Deferred to GYN    Rectal exam: Deferred to GYN    Extremities:  No clubbing, cyanosis or edema. Pulses:  Dorsalis pedis and posterior tibial pulses felt without difficulty. Skin:  No rash or unusual mole changes noted. Lymph Nodes:  None felt in the cervical, supraclavicular, axillary or inguinal region. Neurological:  Cranial nerves II-XII grossly intact. Motor strength 5/5. DTRs 2+ and symmetric. Station and gait normal.  Physical exam otherwise negative        Assessment/Plan:     Diagnoses and all orders for this visit:    Routine physical examination  -     AMB POC HEMOGLOBIN A1C  -     AMB POC COMPLETE CBC,AUTOMATED ENTER  -     AMB POC COMPREHENSIVE METABOLIC PANEL  -     AMB POC CK (CPK)  -     AMB POC LIPID PROFILE  -     COLLECTION VENOUS BLOOD,VENIPUNCTURE  -     AMB POC TSH  -     AMB POC URINALYSIS DIP STICK AUTO W/ MICRO   -     AMB POC VITAMIN D  -     COLOGUARD TEST (FECAL DNA COLORECTAL CANCER SCREENING)    Hypercholesterolemia    Mild intermittent asthma without complication    GERD without esophagitis    Fibromyalgia    Severe anxiety with panic    Colon cancer screening  -     COLOGUARD TEST (FECAL DNA COLORECTAL CANCER SCREENING)        Other instructions: The patient's medications are reviewed and reconciled. No change in her current medical regimen is made. A prudent diet and exercise are encouraged. Await results of multiple labs. She is in need of colorectal screening and I have offered her color guard as a testing method. The patient is agreeable to trying this since it will not require any type of an office visit or specialist to do the procedure. Follow-up otherwise in 6 months time    Follow-up Disposition:  Return in about 6 months (around 10/23/2018).     Deepak Dailey MD

## 2018-04-23 NOTE — PROGRESS NOTES
Dianna Horton is a 47 y.o. female presenting for Complete Physical  .     1. Have you been to the ER, urgent care clinic since your last visit? Hospitalized since your last visit? No    2. Have you seen or consulted any other health care providers outside of the [address] [place] [address] since your last visit? Include any pap smears or colon screening. No    No flowsheet data found. No flowsheet data found. PHQ over the last two weeks 4/23/2018   Little interest or pleasure in doing things Not at all   Feeling down, depressed or hopeless Not at all   Total Score PHQ 2 0       There are no discontinued medications. Dianna Horton is a 47 y.o. female presenting for Complete Physical and Dizziness  . 1. Have you been to the ER, urgent care clinic since your last visit? Hospitalized since your last visit? No    2. Have you seen or consulted any other health care providers outside of the [address] [place] [address] since your last visit? Include any pap smears or colon screening. No    No flowsheet data found. No flowsheet data found. PHQ over the last two weeks 4/23/2018   Little interest or pleasure in doing things Not at all   Feeling down, depressed or hopeless Not at all   Total Score PHQ 2 0       There are no discontinued medications.

## 2018-04-23 NOTE — MR AVS SNAPSHOT
[address] [place] [address] [address] [facility] [address] [address] [phone] Patient: Olinda Dinh MRN: [id number] :[id number] Visit Information Date & Time Provider Department Dept. Phone Encounter #  
 2018  9:30 AM Thompson Chen 84 [phone] [id number] Follow-up Instructions Return in about 6 months (around 10/23/2018). Upcoming Health Maintenance Date Due Pneumococcal 19-64 Medium Risk (1 of 1 - PPSV23) 1985 DTaP/Tdap/Td series (1 - Tdap) 1987 PAP AKA CERVICAL CYTOLOGY 1987 FOBT Q 1 YEAR AGE 50-75 2016 Influenza Age 5 to Adult 2017 BREAST CANCER SCRN MAMMOGRAM 2020 Allergies as of 2018  Review Complete On: 2018 By: Esther Miller MD  
  
 Severity Noted Reaction Type Reactions Pcn [Penicillins]  08/10/2017    Hives Sulfa (Sulfonamide Antibiotics)  08/10/2017    Hives Current Immunizations  Never Reviewed No immunizations on file. Not reviewed this visit You Were Diagnosed With   
  
 Codes Comments Routine physical examination    -  Primary ICD-10-CM: Z00.00 ICD-9-CM: V70.0 Hypercholesterolemia     ICD-10-CM: E78.00 ICD-9-CM: 272.0 Mild intermittent asthma without complication     T-46-JE: J45.20 ICD-9-CM: 493.90 GERD without esophagitis     ICD-10-CM: K21.9 ICD-9-CM: 530.81 Fibromyalgia     ICD-10-CM: M79.7 ICD-9-CM: 729.1 Severe anxiety with panic     ICD-10-CM: F41.0 ICD-9-CM: 300.01 Colon cancer screening     ICD-10-CM: Z12.11 ICD-9-CM: V76.51 Vitals BP Pulse Height(growth percentile) Weight(growth percentile) SpO2 BMI  
 118/80 (BP 1 Location: Left arm, BP Patient Position: Sitting) 75 5' 4.5\" (1.638 m) 133 lb (60.3 kg) 97% 22.48 kg/m2 Smoking Status Current Every Day Smoker BMI and BSA Data Body Mass Index Body Surface Area  
 22.48 kg/m 2 1.66 m 2 Preferred Pharmacy Pharmacy Name Phone [facility] [phone] Your Updated Medication List  
  
   
This list is accurate as of 4/23/18  9:50 AM.  Always use your most recent med list.  
  
  
  
  
 ALPRAZolam 0.5 mg tablet Commonly known as:  Shannan Peppers Take 1 Tab by mouth three (3) times daily as needed for Anxiety. Max Daily Amount: 1.5 mg.  
  
 Biotin 2,500 mcg Cap Take  by mouth.  
  
 pravastatin 20 mg tablet Commonly known as:  PRAVACHOL  
TAKE 1 (ONE) TABLET TABLET, ORAL, DAILY TO HELP LOWER CHOLESTEROL  
  
 PROAIR HFA 90 mcg/actuation inhaler Generic drug:  albuterol Take  by inhalation. PROTONIX 40 mg tablet Generic drug:  pantoprazole Take 40 mg by mouth daily. VITAMIN D2 PO Take  by mouth. We Performed the Following AMB POC CK (CPK) [59125 CPT(R)] AMB POC COMPLETE CBC,AUTOMATED ENTER D131068 CPT(R)] AMB POC COMPREHENSIVE METABOLIC PANEL [69690 CPT(R)] AMB POC HEMOGLOBIN A1C [77418 CPT(R)] AMB POC LIPID PROFILE [42623 CPT(R)] AMB POC TSH [76805 CPT(R)] AMB POC URINALYSIS DIP STICK AUTO W/ MICRO  [90178 CPT(R)] AMB POC VITAMIN D [18009 CPT(R)] COLLECTION VENOUS BLOOD,VENIPUNCTURE T2626350 CPT(R)] COLOGUARD TEST (FECAL DNA COLORECTAL CANCER SCREENING) [26957 CPT(R)] Follow-up Instructions Return in about 6 months (around 10/23/2018). Introducing [facility]! [facility] introduces [employer] patient portal. Now you can access parts of your medical record, email your doctor's office, and request medication refills online. 1. In your internet browser, go to [email or web address] 2. Click on the First Time User? Click Here link in the Sign In box. You will see the New Member Sign Up page. 3. Enter your [employer] Access Code exactly as it appears below. You will not need to use this code after youve completed the sign-up process. If you do not sign up before the expiration date, you must request a new code. · [employer] Access Code: [email or web address] Expires: 7/22/2018  9:22 AM 
 
4. Enter the last four digits of your Social Security Number (xxxx) and Date of Birth (mm/dd/yyyy) as indicated and click Submit. You will be taken to the next sign-up page. 5. Create a [employer] ID. This will be your [employer] login ID and cannot be changed, so think of one that is secure and easy to remember. 6. Create a [employer] password. You can change your password at any time. 7. Enter your Password Reset Question and Answer. This can be used at a later time if you forget your password. 8. Enter your e-mail address. You will receive e-mail notification when new information is available in [address] 9. Click Sign Up. You can now view and download portions of your medical record. 10. Click the Download Summary menu link to download a portable copy of your medical information. If you have questions, please visit the Frequently Asked Questions section of the [employer] website. Remember, [employer] is NOT to be used for urgent needs. For medical emergencies, dial 911. Now available from your iPhone and Android! Please provide this summary of care documentation to your next provider. Your primary care clinician is listed as ELFEGO Gerard 21. If you have any questions after today's visit, please call [phone].

## 2018-05-10 DIAGNOSIS — F41.0 SEVERE ANXIETY WITH PANIC: ICD-10-CM

## 2018-05-10 NOTE — TELEPHONE ENCOUNTER
Last Refill: 4/3/2018  Last visit:4/23/2018      Requested Prescriptions     Pending Prescriptions Disp Refills    ALPRAZolam (XANAX) 0.5 mg tablet 90 Tab 0     Sig: Take 1 Tab by mouth three (3) times daily as needed for Anxiety.  Max Daily Amount: 1.5 mg.

## 2018-05-11 RX ORDER — ALPRAZOLAM 0.5 MG/1
0.5 TABLET ORAL
Qty: 90 TAB | Refills: 0 | Status: SHIPPED | OUTPATIENT
Start: 2018-05-11 | End: 2018-06-18 | Stop reason: SDUPTHER

## 2018-06-18 DIAGNOSIS — F41.0 SEVERE ANXIETY WITH PANIC: ICD-10-CM

## 2018-06-18 RX ORDER — ALPRAZOLAM 0.5 MG/1
0.5 TABLET ORAL
Qty: 90 TAB | Refills: 0 | Status: SHIPPED | OUTPATIENT
Start: 2018-06-18 | End: 2018-07-31 | Stop reason: SDUPTHER

## 2018-06-18 NOTE — TELEPHONE ENCOUNTER
Last Refill: 5/11/2018  Last visit:4/23/2018    Requested Prescriptions     Pending Prescriptions Disp Refills    ALPRAZolam (XANAX) 0.5 mg tablet 90 Tab 0     Sig: Take 1 Tab by mouth three (3) times daily as needed for Anxiety.  Max Daily Amount: 1.5 mg.

## 2018-07-31 DIAGNOSIS — F41.0 SEVERE ANXIETY WITH PANIC: ICD-10-CM

## 2018-07-31 RX ORDER — ALPRAZOLAM 0.5 MG/1
TABLET ORAL
Qty: 90 TAB | Refills: 0 | Status: SHIPPED | OUTPATIENT
Start: 2018-07-31 | End: 2018-09-05 | Stop reason: SDUPTHER

## 2018-08-15 RX ORDER — ALBUTEROL SULFATE 90 UG/1
AEROSOL, METERED RESPIRATORY (INHALATION)
Qty: 1 INHALER | Refills: 3 | Status: SHIPPED | OUTPATIENT
Start: 2018-08-15 | End: 2019-10-14 | Stop reason: SDUPTHER

## 2018-08-30 LAB — COLOGUARD TEST, EXTERNAL: NEGATIVE

## 2018-08-31 ENCOUNTER — TELEPHONE (OUTPATIENT)
Dept: INTERNAL MEDICINE CLINIC | Age: 53
End: 2018-08-31

## 2018-09-05 DIAGNOSIS — F41.0 SEVERE ANXIETY WITH PANIC: ICD-10-CM

## 2018-09-05 RX ORDER — PRAVASTATIN SODIUM 20 MG/1
TABLET ORAL
Qty: 30 TAB | Refills: 6 | Status: SHIPPED | OUTPATIENT
Start: 2018-09-05 | End: 2018-11-26 | Stop reason: SDUPTHER

## 2018-09-05 RX ORDER — ALPRAZOLAM 0.5 MG/1
TABLET ORAL
Qty: 90 TAB | Refills: 0 | Status: SHIPPED | OUTPATIENT
Start: 2018-09-05 | End: 2018-10-22 | Stop reason: SDUPTHER

## 2018-10-22 ENCOUNTER — OFFICE VISIT (OUTPATIENT)
Dept: INTERNAL MEDICINE CLINIC | Age: 53
End: 2018-10-22

## 2018-10-22 VITALS
HEIGHT: 65 IN | DIASTOLIC BLOOD PRESSURE: 80 MMHG | OXYGEN SATURATION: 97 % | SYSTOLIC BLOOD PRESSURE: 116 MMHG | BODY MASS INDEX: 22.19 KG/M2 | HEART RATE: 76 BPM | WEIGHT: 133.2 LBS

## 2018-10-22 DIAGNOSIS — J45.20 MILD INTERMITTENT ASTHMA WITHOUT COMPLICATION: ICD-10-CM

## 2018-10-22 DIAGNOSIS — M79.7 FIBROMYALGIA: ICD-10-CM

## 2018-10-22 DIAGNOSIS — F41.0 SEVERE ANXIETY WITH PANIC: ICD-10-CM

## 2018-10-22 DIAGNOSIS — K21.9 GERD WITHOUT ESOPHAGITIS: ICD-10-CM

## 2018-10-22 DIAGNOSIS — E78.00 HYPERCHOLESTEROLEMIA: Primary | ICD-10-CM

## 2018-10-22 RX ORDER — ALPRAZOLAM 0.5 MG/1
TABLET ORAL
Qty: 90 TAB | Refills: 0 | Status: SHIPPED | OUTPATIENT
Start: 2018-10-22 | End: 2018-12-04 | Stop reason: SDUPTHER

## 2018-10-22 RX ORDER — ALPRAZOLAM 0.5 MG/1
TABLET ORAL
Qty: 90 TAB | Refills: 0 | OUTPATIENT
Start: 2018-10-22

## 2018-10-22 NOTE — PROGRESS NOTES
Harish Garg is a 47 y.o. female presenting for Anxiety (6 mo fu) Elizabeth Ball 1. Have you been to the ER, urgent care clinic since your last visit? Hospitalized since your last visit? No 
 
2. Have you seen or consulted any other health care providers outside of the [address] [place] [address] since your last visit? Include any pap smears or colon screening. Phyciatrist follow up. No flowsheet data found. No flowsheet data found. PHQ over the last two weeks 4/23/2018 Little interest or pleasure in doing things Not at all Feeling down, depressed, irritable, or hopeless Not at all Total Score PHQ 2 0 There are no discontinued medications.

## 2018-10-22 NOTE — PROGRESS NOTES
This note will not be viewable in [address] Katie Glez is a 47 y.o. female and presents with Anxiety (6 mo fu) Apollo Delacruz Subjective: 
Gladys Mendoza returns to our office today in follow-up of multiple medical problems. The patient has hypercholesterolemia and remains on pravastatin therapy. She is tolerating this without muscle soreness or GI upset. Her last LDL was near 107 and HDL was greater than 70. She has no history of coronary artery disease and denies exertional chest pains or claudication. The patient has a history of asthma. She does use albuterol on a as needed basis. She has had no exacerbation of symptoms. She refuses an influenza vaccination today. GERD is managed on PPI therapy. She denies any breakthrough heartburn and is had no history of any dysphasia, early satiety or unexplained weight loss. She has a history of severe anxiety with panic. We have tried her on SSRI therapy in the past but without any relief of symptoms. She currently does well on Xanax which she takes on a regular basis during the day. She has had no panic attacks and her anxiety has been well controlled. There have been no long-term side effects from her Xanax usage. Past Medical History:  
Diagnosis Date  Acne vulgaris 8/10/2017  Anxiety 10/2/2017  Cigarette nicotine dependence without complication 7/04/1269  Fibromyalgia 8/10/2017  GERD without esophagitis 8/10/2017  Hypercholesterolemia 8/10/2017  Lingual tonsillitis 8/10/2017  Menopause 8/10/2017  Mild intermittent asthma without complication 7/18/4333  Severe anxiety with panic 8/10/2017 Past Surgical History:  
Procedure Laterality Date  IMPLANT BREAST SILICONE/EQ Bilateral 5638  
 subglandular Allergies Allergen Reactions  Pcn [Penicillins] Hives  Sulfa (Sulfonamide Antibiotics) Hives Current Outpatient Medications Medication Sig Dispense Refill  ALPRAZolam (XANAX) 0.5 mg tablet TAKE 1 TABLET THREE TIMES DAILY IF NEEDED FOR ANXIETY 90 Tab 0  pravastatin (PRAVACHOL) 20 mg tablet TAKE 1 (ONE) TABLET TABLET, ORAL, DAILY TO HELP LOWER CHOLESTEROL 30 Tab 6  
 PROAIR HFA 90 mcg/actuation inhaler USE 2 PUFFS EVERY FOUR HOURS IF NEEDED 1 Inhaler 3  
 ERGOCALCIFEROL, VITAMIN D2, (VITAMIN D2 PO) Take  by mouth.  pantoprazole (PROTONIX) 40 mg tablet Take 40 mg by mouth daily.  Biotin 2,500 mcg cap Take  by mouth. Social History Socioeconomic History  Marital status:  Spouse name: Not on file  Number of children: Not on file  Years of education: Not on file  Highest education level: Not on file Social Needs  Financial resource strain: Not on file  Food insecurity - worry: Not on file  Food insecurity - inability: Not on file  Transportation needs - medical: Not on file  Transportation needs - non-medical: Not on file Occupational History  Not on file Tobacco Use  Smoking status: Current Every Day Smoker Packs/day: 0.50  Smokeless tobacco: Never Used Substance and Sexual Activity  Alcohol use: Yes Alcohol/week: 7.2 oz Types: 12 Cans of beer per week  Drug use: No  
 Sexual activity: Not on file Other Topics Concern  Not on file Social History Narrative  Not on file Family History Problem Relation Age of Onset  Cancer Mother   
     skin, throat [facility] Arthritis-osteo Mother  Anxiety Mother  No Known Problems Father Health Maintenance Topic Date Due  Pneumococcal 19-64 Medium Risk (1 of 1 - PPSV23) 04/05/1985  
 DTaP/Tdap/Td series (1 - Tdap) 04/05/1987  PAP AKA CERVICAL CYTOLOGY  04/05/1987  Shingrix Vaccine Age 50> (1 of 2) 04/05/2016  Influenza Age 5 to Adult  04/05/2019 (Originally 8/1/2018)  BREAST CANCER SCRN MAMMOGRAM  02/06/2020  Cologuard Q 3 Years  08/23/2021 Review of Systems Constitutional: negative for fevers, chills, anorexia and weight loss Eyes:   negative for visual disturbance and irritation ENT:   negative for tinnitus,sore throat,nasal congestion,ear pain,hoarseness Respiratory:  negative for cough, hemoptysis, dyspnea,wheezing CV:   negative for chest pain, palpitations, lower extremity edema GI:   negative for nausea, vomiting, diarrhea, abdominal pain,melena Endo:               negative for polyuria,polydipsia,polyphagia,heat intolerance Genitourinary: negative for frequency, dysuria and hematuria Integumentary: negative for rash and pruritus Hematologic:  negative for easy bruising and gum/nose bleeding Musculoskel: negative for myalgias, arthralgias, back pain, muscle weakness, joint pain Neurological:  negative for headaches, dizziness, vertigo, memory problems and gait Behavl/Psych: negative for feelings of anxiety, depression, mood changes ROS otherwise negative Objective: 
Visit Vitals /80 (BP 1 Location: Right arm, BP Patient Position: Sitting) Pulse 76 Ht 5' 4.5\" (1.638 m) Wt 133 lb 3.2 oz (60.4 kg) SpO2 97% BMI 22.51 kg/m² Body mass index is 22.51 kg/m². Physical Exam:  
General appearance - alert, well appearing, and in no distress Mental status - alert, oriented to person, place, and time EYE-STEPHANIE, EOMI,conjunctiva normal bilaterally, lids normal 
ENT-ENT exam normal, no neck nodes or sinus tenderness Nose - normal and patent, no erythema,  Or discharge Mouth - mucous membranes moist, pharynx normal without lesions Neck - supple, no significant adenopathy or bruit Chest - clear to auscultation, no wheezes, rales or rhonchi. Heart - normal rate, regular rhythm, normal S1, S2, no murmurs, rubs, clicks or gallops Abdomen - soft, nontender, nondistended, no masses or organomegaly Lymph- no adenopathy palpable Ext-peripheral pulses normal, no pedal edema, no clubbing or cyanosis Skin-Warm and dry. no hyperpigmentation, vitiligo, or suspicious lesions Neuro -alert, oriented, normal speech, no focal findings or movement disorder noted Assessment/Plan: 
Diagnoses and all orders for this visit: Hypercholesterolemia GERD without esophagitis Fibromyalgia Mild intermittent asthma without complication Severe anxiety with panic Other instructions: The patient's medications are reviewed and reconciled. No change in her current medical regimen is made. An exercise program is encouraged Influenza vaccination is declined Lab results from 4/23 were reviewed with the patient today. Follow-up for complete checkup in 6 months Follow-up Disposition: 
Return in about 6 months (around 4/22/2019). I have reviewed with the patient details of the assessment and plan and all questions were answered. Relevent patient education was performed. The most recent lab findings were reviewed with the patient. An After Visit Summary was printed and given to the patient.  
 
Renee Walton MD

## 2018-10-22 NOTE — PATIENT INSTRUCTIONS
Asthma in Adults: Care Instructions Your Care Instructions During an asthma attack, your airways swell and narrow as a reaction to certain things (triggers). This makes it hard to breathe. You may be able to prevent asthma attacks if you avoid the things that set off your asthma symptoms. Keeping your asthma under control and treating symptoms before they get bad can help you avoid severe attacks. If you can control your asthma, you may be able to do all of your normal daily activities. You may also avoid asthma attacks and trips to the hospital. 
Follow-up care is a key part of your treatment and safety. Be sure to make and go to all appointments, and call your doctor if you are having problems. It's also a good idea to know your test results and keep a list of the medicines you take. How can you care for yourself at home? · Follow your asthma action plan so you can manage your symptoms at home. An asthma action plan will help you prevent and control airway reactions and will tell you what to do during an asthma attack. If you do not have an asthma action plan, work with your doctor to build one. · Take your asthma medicine exactly as prescribed. Medicine plays an important role in controlling asthma. Talk to your doctor right away if you have any questions about what to take and how to take it. ? Use your quick-relief medicine when you have symptoms of an attack. Quick-relief medicine often is an albuterol inhaler. Some people need to use quick-relief medicine before they exercise. ? Take your controller medicine every day, not just when you have symptoms. Controller medicine is usually an inhaled corticosteroid. The goal is to prevent problems before they occur. Do not use your controller medicine to try to treat an attack that has already started. It does not work fast enough to help.  
? If your doctor prescribed corticosteroid pills to use during an attack, take them as directed. They may take hours to work, but they may shorten the attack and help you breathe better. ? Keep your quick-relief medicine with you at all times. · Talk to your doctor before using other medicines. Some medicines, such as aspirin, can cause asthma attacks in some people. · Check yourself for asthma symptoms to know which step to follow in your action plan. Watch for things like being short of breath, having chest tightness, coughing, and wheezing. Also notice if symptoms wake you up at night or if you get tired quickly when you exercise. · If you have a peak flow meter, use it to check how well you are breathing. This can help you predict when an asthma attack is going to occur. Then you can take medicine to prevent the asthma attack or make it less severe. · See your doctor regularly. These visits will help you learn more about asthma and what you can do to control it. Your doctor will monitor your treatment to make sure the medicine is helping you. · Keep track of your asthma attacks and your treatment. After you have had an attack, write down what triggered it, what helped end it, and any concerns you have about your asthma action plan. Take your diary when you see your doctor. You can then review your asthma action plan and decide if it is working. · Do not smoke or allow others to smoke around you. Avoid smoky places. Smoking makes asthma worse. If you need help quitting, talk to your doctor about stop-smoking programs and medicines. These can increase your chances of quitting for good. · Learn what triggers an asthma attack for you, and avoid the triggers when you can. Common triggers include colds, smoke, air pollution, dust, pollen, mold, pets, cockroaches, stress, and cold air. · Avoid colds and the flu. Get a pneumococcal vaccine shot. If you have had one before, ask your doctor whether you need a second dose.  Get a flu vaccine every fall. If you must be around people with colds or the flu, wash your hands often. When should you call for help? Call 911 anytime you think you may need emergency care. For example, call if: 
  · You have severe trouble breathing.  
 Call your doctor now or seek immediate medical care if: 
  · Your symptoms do not get better after you have followed your asthma action plan.  
  · You cough up yellow, dark brown, or bloody mucus (sputum).  
 Watch closely for changes in your health, and be sure to contact your doctor if: 
  · Your coughing and wheezing get worse.  
  · You need to use quick-relief medicine on more than 2 days a week (unless it is just for exercise).  
  · You need help figuring out what is triggering your asthma attacks. Where can you learn more? Go to http://mishel-orville.info/. Enter P597 in the search box to learn more about \"Asthma in Adults: Care Instructions. \" Current as of: December 6, 2017 Content Version: 11.8 © 4436-3715 [employer]. Care instructions adapted under license by [facility] (which disclaims liability or warranty for this information). If you have questions about a medical condition or this instruction, always ask your healthcare professional. [address] any warranty or liability for your use of this information.

## 2018-11-26 RX ORDER — PRAVASTATIN SODIUM 20 MG/1
TABLET ORAL
Qty: 90 TAB | Refills: 3 | Status: SHIPPED | OUTPATIENT
Start: 2018-11-26 | End: 2018-12-17 | Stop reason: SDUPTHER

## 2018-11-26 NOTE — TELEPHONE ENCOUNTER
Pharmacy on file verified  Requested Prescriptions     Pending Prescriptions Disp Refills    pravastatin (PRAVACHOL) 20 mg tablet 90 Tab 3     Sig: TAKE 1 (ONE) TABLET TABLET, ORAL, DAILY TO HELP LOWER CHOLESTEROL     LOV 10/22/2018  NOV 4/24/2019  Patients insurance is requiring a 90 day supply due to coverage

## 2018-12-04 DIAGNOSIS — F41.0 SEVERE ANXIETY WITH PANIC: ICD-10-CM

## 2018-12-04 RX ORDER — ALPRAZOLAM 0.5 MG/1
TABLET ORAL
Qty: 90 TAB | Refills: 0 | Status: SHIPPED | OUTPATIENT
Start: 2018-12-04 | End: 2019-01-10 | Stop reason: SDUPTHER

## 2018-12-17 RX ORDER — DOXYCYCLINE 100 MG/1
CAPSULE ORAL
Qty: 60 CAP | Refills: 1 | Status: SHIPPED | OUTPATIENT
Start: 2018-12-17 | End: 2020-03-03

## 2018-12-17 RX ORDER — PRAVASTATIN SODIUM 20 MG/1
TABLET ORAL
Qty: 90 TAB | Refills: 3 | Status: SHIPPED | OUTPATIENT
Start: 2018-12-17 | End: 2019-12-15 | Stop reason: SDUPTHER

## 2018-12-17 NOTE — TELEPHONE ENCOUNTER
Pharmacy on file verified  Requested Prescriptions     Pending Prescriptions Disp Refills    pravastatin (PRAVACHOL) 20 mg tablet 90 Tab 3     Sig: TAKE 1 (ONE) TABLET TABLET, ORAL, DAILY TO HELP LOWER CHOLESTEROL     LOV 10/22/2018  NOV 4/24/2019

## 2019-01-10 DIAGNOSIS — F41.0 SEVERE ANXIETY WITH PANIC: ICD-10-CM

## 2019-01-10 RX ORDER — ALPRAZOLAM 0.5 MG/1
TABLET ORAL
Qty: 90 TAB | Refills: 0 | Status: SHIPPED | OUTPATIENT
Start: 2019-01-10 | End: 2019-02-15 | Stop reason: SDUPTHER

## 2019-02-13 ENCOUNTER — TELEPHONE (OUTPATIENT)
Dept: INTERNAL MEDICINE CLINIC | Age: 54
End: 2019-02-13

## 2019-02-13 ENCOUNTER — HOSPITAL ENCOUNTER (OUTPATIENT)
Dept: MAMMOGRAPHY | Age: 54
Discharge: HOME | End: 2019-02-13
Attending: INTERNAL MEDICINE
Payer: COMMERCIAL

## 2019-02-13 DIAGNOSIS — Z12.39 SCREENING BREAST EXAMINATION: ICD-10-CM

## 2019-02-13 PROCEDURE — 77067 SCR MAMMO BI INCL CAD: CPT

## 2019-02-13 NOTE — TELEPHONE ENCOUNTER
Patient has had a cold since last Wednesday and she wants to know if she can come in on Friday around 8 because she does not drive and her  has an appointment with Dr. Staci Blair.

## 2019-02-15 ENCOUNTER — OFFICE VISIT (OUTPATIENT)
Dept: INTERNAL MEDICINE CLINIC | Age: 54
End: 2019-02-15

## 2019-02-15 VITALS
HEIGHT: 65 IN | DIASTOLIC BLOOD PRESSURE: 80 MMHG | OXYGEN SATURATION: 97 % | RESPIRATION RATE: 22 BRPM | TEMPERATURE: 98.1 F | HEART RATE: 75 BPM | SYSTOLIC BLOOD PRESSURE: 118 MMHG | WEIGHT: 135.8 LBS | BODY MASS INDEX: 22.63 KG/M2

## 2019-02-15 DIAGNOSIS — J01.00 ACUTE MAXILLARY SINUSITIS, RECURRENCE NOT SPECIFIED: Primary | ICD-10-CM

## 2019-02-15 DIAGNOSIS — F41.0 SEVERE ANXIETY WITH PANIC: ICD-10-CM

## 2019-02-15 RX ORDER — LEVOFLOXACIN 500 MG/1
500 TABLET, FILM COATED ORAL DAILY
Qty: 10 TAB | Refills: 0 | Status: SHIPPED | OUTPATIENT
Start: 2019-02-15 | End: 2019-02-25

## 2019-02-15 RX ORDER — ALPRAZOLAM 0.5 MG/1
TABLET ORAL
Qty: 90 TAB | Refills: 0 | Status: SHIPPED | OUTPATIENT
Start: 2019-02-15 | End: 2019-03-19 | Stop reason: SDUPTHER

## 2019-02-15 NOTE — PROGRESS NOTES
Dianna Horton is a 47 y.o. female presenting for Sinus Infection [facility] 1. Have you been to the ER, urgent care clinic since your last visit? Hospitalized since your last visit? No 
 
2. Have you seen or consulted any other health care providers outside of the [address] [place] [address] since your last visit? Include any pap smears or colon screening. No 
 
No flowsheet data found. No flowsheet data found. 3 most recent PHQ Screens 2/15/2019 Little interest or pleasure in doing things Not at all Feeling down, depressed, irritable, or hopeless Not at all Total Score PHQ 2 0 There are no discontinued medications.

## 2019-02-15 NOTE — PROGRESS NOTES
This note will not be viewable in [address] Maximino Lowry is a 47 y.o. female and presents with Sinus Infection Michaelyn Daily Subjective: 
Mrs. Dorene Kim presents to the office today with complaints of an upper respiratory infection ongoing for greater than a week with the development of sinus congestion maxillary sinus pain retro-orbital headaches and purulent postnasal drainage. The drainage has been causing a significant cough but she has had no wheezing or shortness of breath. She denies high fever or shaking chills and has been taking over-the-counter medication without relief. Patient Active Problem List  
Diagnosis Code  Acne vulgaris L70.0  Severe anxiety with panic F41.0  Mild intermittent asthma without complication K23.47  Cigarette nicotine dependence without complication Y07.131  
 Fibromyalgia M79.7  GERD without esophagitis K21.9  Hypercholesterolemia E78.00  Lingual tonsillitis J03.90  Menopause Z78.0 Past Surgical History:  
Procedure Laterality Date  IMPLANT BREAST SILICONE/EQ Bilateral 6606  
 subglandular Allergies Allergen Reactions  Pcn [Penicillins] Hives  Sulfa (Sulfonamide Antibiotics) Hives Current Outpatient Medications Medication Sig Dispense Refill  levoFLOXacin (LEVAQUIN) 500 mg tablet Take 1 Tab by mouth daily for 10 days. 10 Tab 0  ALPRAZolam (XANAX) 0.5 mg tablet TAKE 1 TABLET THREE TIMES DAILY IF NEEDED FOR ANXIETY 90 Tab 0  
 doxycycline (VIBRAMYCIN) 100 mg capsule TAKE 1 CAPSULE TWICE A DAY WITH FOOD 60 Cap 1  
 pravastatin (PRAVACHOL) 20 mg tablet TAKE 1 (ONE) TABLET TABLET, ORAL, DAILY TO HELP LOWER CHOLESTEROL 90 Tab 3  
 PROAIR HFA 90 mcg/actuation inhaler USE 2 PUFFS EVERY FOUR HOURS IF NEEDED 1 Inhaler 3  
 ERGOCALCIFEROL, VITAMIN D2, (VITAMIN D2 PO) Take  by mouth.  pantoprazole (PROTONIX) 40 mg tablet Take 40 mg by mouth daily.  Biotin 2,500 mcg cap Take  by mouth. Social History Socioeconomic History  Marital status:  Spouse name: Not on file  Number of children: Not on file  Years of education: Not on file  Highest education level: Not on file Tobacco Use  Smoking status: Current Every Day Smoker Packs/day: 0.50  Smokeless tobacco: Never Used Substance and Sexual Activity  Alcohol use: Yes Alcohol/week: 7.2 oz Types: 12 Cans of beer per week  Drug use: No  
 
Family History Problem Relation Age of Onset  Cancer Mother   
     skin, throat [facility] Arthritis-osteo Mother  Anxiety Mother  No Known Problems Father Review of Systems Constitutional: negative for fevers, chills, anorexia and weight loss Eyes:   negative for visual disturbance and irritation ENT:   Positive for sinus congestion, maxillary discomfort, purulent psotnasal draingage and throat irritation Respiratory:  negative for cough, hemoptysis, dyspnea,wheezing CV:   negative for chest pain, palpitations, lower extremity edema GI:   negative for nausea, vomiting, diarrhea, abdominal pain,melena Integumentary: negative for rash and pruritus Neurological:  negative for headaches, dizziness, vertigo, memory problems and gait Objective: 
Visit Vitals /80 (BP 1 Location: Left arm, BP Patient Position: Sitting) Pulse 75 Temp 98.1 °F (36.7 °C) (Oral) Resp 22 Ht 5' 4.5\" (1.638 m) Wt 135 lb 12.8 oz (61.6 kg) SpO2 97% BMI 22.95 kg/m² Body mass index is 22.95 kg/m². Physical Exam:  
General appearance - alert, well appearing, and in no distress Mental status - alert, oriented to person, place, and time EYE-STEPHANIE, EOMI, sclera clear. No lid swelling or purulent drainage ENT- TM's clear without A/F level. Pharynx slightly erythematous with drainage noted Nose - normal and patent, no erythema, Neck - supple, no significant adenopathy Chest - clear to auscultation, no wheezes, rales or rhonchi, symmetric air entry Heart - normal rate, regular rhythm, normal S1, S2, no murmurs, rubs, clicks or gallops Skin-No rash appreciated Neuro -alert, oriented, normal speech, no focal findings. Assessment/Plan: 
Diagnoses and all orders for this visit: 
 
Acute maxillary sinusitis, recurrence not specified -     levoFLOXacin (LEVAQUIN) 500 mg tablet; Take 1 Tab by mouth daily for 10 days. , Normal, Disp-10 Tab, R-0 Severe anxiety with panic -     ALPRAZolam (XANAX) 0.5 mg tablet; TAKE 1 TABLET THREE TIMES DAILY IF NEEDED FOR ANXIETY, PrintGeneric For:XANAX  0.5MGDisp-90 Tab, R-0 Other Instructions: The patient's medications were reviewed and reconciled. She is directed not to take her Vibramycin while she is on the Levaquin but may restart it once her Levaquin has been completed. She requests a refill on her Xanax which she continues to take for her severe anxiety with intermittent panic Mucinex as directed Increase po fluids Follow-up Disposition: 
Return if symptoms worsen or fail to improve. I have reviewed with the patient details of the assessment and plan and all questions were answered. Relevent patient education was performed. An After Visit Summary was printed and given to the patient.  
 
Pauline Dela Cruz MD

## 2019-02-15 NOTE — PATIENT INSTRUCTIONS
Sinusitis: Care Instructions Your Care Instructions Sinusitis is an infection of the lining of the sinus cavities in your head. Sinusitis often follows a cold. It causes pain and pressure in your head and face. In most cases, sinusitis gets better on its own in 1 to 2 weeks. But some mild symptoms may last for several weeks. Sometimes antibiotics are needed. Follow-up care is a key part of your treatment and safety. Be sure to make and go to all appointments, and call your doctor if you are having problems. It's also a good idea to know your test results and keep a list of the medicines you take. How can you care for yourself at home? · Take an over-the-counter pain medicine, such as acetaminophen (Tylenol), ibuprofen (Advil, Motrin), or naproxen (Aleve). Read and follow all instructions on the label. · If the doctor prescribed antibiotics, take them as directed. Do not stop taking them just because you feel better. You need to take the full course of antibiotics. · Be careful when taking over-the-counter cold or flu medicines and Tylenol at the same time. Many of these medicines have acetaminophen, which is Tylenol. Read the labels to make sure that you are not taking more than the recommended dose. Too much acetaminophen (Tylenol) can be harmful. · Breathe warm, moist air from a steamy shower, a hot bath, or a sink filled with hot water. Avoid cold, dry air. Using a humidifier in your home may help. Follow the directions for cleaning the machine. · Use saline (saltwater) nasal washes to help keep your nasal passages open and wash out mucus and bacteria. You can buy saline nose drops at a grocery store or drugstore. Or you can make your own at home by adding 1 teaspoon of salt and 1 teaspoon of baking soda to 2 cups of distilled water. If you make your own, fill a bulb syringe with the solution, insert the tip into your nostril, and squeeze gently. Grazyna Peels your nose. · Put a hot, wet towel or a warm gel pack on your face 3 or 4 times a day for 5 to 10 minutes each time. · Try a decongestant nasal spray like oxymetazoline (Afrin). Do not use it for more than 3 days in a row. Using it for more than 3 days can make your congestion worse. When should you call for help? Call your doctor now or seek immediate medical care if: 
  · You have new or worse swelling or redness in your face or around your eyes.  
  · You have a new or higher fever.  
 Watch closely for changes in your health, and be sure to contact your doctor if: 
  · You have new or worse facial pain.  
  · The mucus from your nose becomes thicker (like pus) or has new blood in it.  
  · You are not getting better as expected. Where can you learn more? Go to http://mishel-orville.info/. Enter W320 in the search box to learn more about \"Sinusitis: Care Instructions. \" Current as of: March 27, 2018 Content Version: 11.9 © 9379-5735 [employer]. Care instructions adapted under license by [employer] (which disclaims liability or warranty for this information). If you have questions about a medical condition or this instruction, always ask your healthcare professional. [address] any warranty or liability for your use of this information.

## 2019-03-19 DIAGNOSIS — F41.0 SEVERE ANXIETY WITH PANIC: ICD-10-CM

## 2019-03-19 RX ORDER — ALPRAZOLAM 0.5 MG/1
TABLET ORAL
Qty: 90 TAB | Refills: 0 | Status: SHIPPED | OUTPATIENT
Start: 2019-03-19 | End: 2019-04-18 | Stop reason: SDUPTHER

## 2019-04-18 DIAGNOSIS — F41.0 SEVERE ANXIETY WITH PANIC: ICD-10-CM

## 2019-04-18 RX ORDER — ALPRAZOLAM 0.5 MG/1
TABLET ORAL
Qty: 90 TAB | Refills: 0 | Status: SHIPPED | OUTPATIENT
Start: 2019-04-18 | End: 2019-05-21 | Stop reason: SDUPTHER

## 2019-05-01 ENCOUNTER — OFFICE VISIT (OUTPATIENT)
Dept: INTERNAL MEDICINE CLINIC | Age: 54
End: 2019-05-01

## 2019-05-01 VITALS
OXYGEN SATURATION: 98 % | HEART RATE: 74 BPM | HEIGHT: 65 IN | DIASTOLIC BLOOD PRESSURE: 72 MMHG | WEIGHT: 129.6 LBS | RESPIRATION RATE: 16 BRPM | BODY MASS INDEX: 21.59 KG/M2 | SYSTOLIC BLOOD PRESSURE: 122 MMHG | TEMPERATURE: 98.1 F

## 2019-05-01 DIAGNOSIS — J45.20 MILD INTERMITTENT ASTHMA WITHOUT COMPLICATION: ICD-10-CM

## 2019-05-01 DIAGNOSIS — Z00.00 ROUTINE PHYSICAL EXAMINATION: Primary | ICD-10-CM

## 2019-05-01 DIAGNOSIS — K21.9 GERD WITHOUT ESOPHAGITIS: ICD-10-CM

## 2019-05-01 DIAGNOSIS — M79.7 FIBROMYALGIA: ICD-10-CM

## 2019-05-01 DIAGNOSIS — E78.00 HYPERCHOLESTEROLEMIA: ICD-10-CM

## 2019-05-01 DIAGNOSIS — L70.0 ACNE VULGARIS: ICD-10-CM

## 2019-05-01 DIAGNOSIS — F41.0 SEVERE ANXIETY WITH PANIC: ICD-10-CM

## 2019-05-01 DIAGNOSIS — F17.210 CIGARETTE NICOTINE DEPENDENCE WITHOUT COMPLICATION: ICD-10-CM

## 2019-05-01 LAB
A-G RATIO,AGRAT: 1.6 RATIO
ALBUMIN SERPL-MCNC: 4.5 G/DL (ref 3.9–5.4)
ALP SERPL-CCNC: 62 U/L (ref 38–126)
ALT SERPL-CCNC: 15 U/L (ref 9–52)
ANION GAP SERPL CALC-SCNC: 15 MMOL/L
AST SERPL W P-5'-P-CCNC: 25 U/L (ref 14–36)
BILIRUB SERPL-MCNC: 0.4 MG/DL (ref 0.2–1.3)
BILIRUB UR QL: NEGATIVE
BUN SERPL-MCNC: 10 MG/DL (ref 7–17)
BUN/CREATININE RATIO,BUCR: 17 RATIO
CALCIUM SERPL-MCNC: 8.9 MG/DL (ref 8.4–10.2)
CHLORIDE SERPL-SCNC: 104 MMOL/L (ref 98–107)
CHOL/HDL RATIO,CHHD: 2 RATIO (ref 0–4)
CHOLEST SERPL-MCNC: 164 MG/DL (ref 0–200)
CLARITY: CLEAR
CO2 SERPL-SCNC: 24 MMOL/L (ref 22–32)
COLOR UR: ABNORMAL
CREAT SERPL-MCNC: 0.6 MG/DL (ref 0.7–1.2)
ERYTHROCYTE [DISTWIDTH] IN BLOOD BY AUTOMATED COUNT: 13.1 %
GLOBULIN,GLOB: 2.9
GLUCOSE 24H UR-MRATE: NEGATIVE G/(24.H)
GLUCOSE SERPL-MCNC: 83 MG/DL (ref 65–105)
HCT VFR BLD AUTO: 44.9 % (ref 37–51)
HDLC SERPL-MCNC: 69 MG/DL (ref 35–130)
HGB BLD-MCNC: 15.4 G/DL (ref 12–18)
HGB UR QL STRIP: NEGATIVE
KETONES UR QL STRIP.AUTO: NEGATIVE
LDL/HDL RATIO,LDHD: 1 RATIO
LDLC SERPL CALC-MCNC: 76 MG/DL (ref 0–130)
LEUKOCYTE ESTERASE: NEGATIVE
LYMPHOCYTES ABSOLUTE: 1.6 K/UL (ref 0.6–4.1)
LYMPHOCYTES NFR BLD: 20.9 % (ref 10–58.5)
MCH RBC QN AUTO: 33.7 PG (ref 26–32)
MCHC RBC AUTO-ENTMCNC: 34.3 G/DL (ref 30–36)
MCV RBC AUTO: 98.2 FL (ref 80–97)
MONOCYTES ABS-DIF,2141: 0.7 K/UL (ref 0–1.8)
MONOCYTES NFR BLD: 9.5 % (ref 0.1–24)
NEUTROPHILS # BLD: 69.6 % (ref 37–92)
NEUTROPHILS ABS,2156: 5.4 K/UL (ref 2–7.8)
NITRITE UR QL STRIP.AUTO: NEGATIVE
PH UR STRIP: 6 [PH] (ref 5–7)
PLATELET # BLD AUTO: 226 K/UL (ref 140–440)
PMV BLD AUTO: 8.9 FL
POTASSIUM SERPL-SCNC: 4.7 MMOL/L (ref 3.6–5)
PROT SERPL-MCNC: 7.4 G/DL (ref 6.3–8.2)
PROT UR STRIP-MCNC: NEGATIVE MG/DL
RBC # BLD AUTO: 4.57 M/UL (ref 4.2–6.3)
RBC #/AREA URNS HPF: ABNORMAL #/HPF
SODIUM SERPL-SCNC: 143 MMOL/L (ref 137–145)
SP GR UR REFRACTOMETRY: 1.01 (ref 1–1.03)
TRIGL SERPL-MCNC: 94 MG/DL (ref 0–200)
UROBILINOGEN UR QL STRIP.AUTO: NEGATIVE
VLDLC SERPL CALC-MCNC: 19 MG/DL
WBC # BLD AUTO: 7.7 K/UL (ref 4.1–10.9)
WBC URNS QL MICRO: ABNORMAL #/HPF

## 2019-05-01 RX ORDER — ESTRADIOL AND NORETHINDRONE ACETATE .5; .1 MG/1; MG/1
TABLET ORAL
Refills: 7 | COMMUNITY
Start: 2019-04-25 | End: 2019-11-06 | Stop reason: ALTCHOICE

## 2019-05-01 NOTE — PATIENT INSTRUCTIONS
Using a Metered-Dose Inhaler: Care Instructions Your Care Instructions A metered-dose inhaler lets you breathe medicine into your lungs quickly. Inhaled medicine works faster than the same medicine in a pill. An inhaler allows you to take less medicine than you would need if you took it as a pill. \"Metered-dose\" means that the inhaler gives a measured amount of medicine each time you use it. A metered-dose inhaler gives medicine in the form of a liquid mist. 
Your doctor may want you to use a spacer with your inhaler. A spacer is a chamber that you attach to the inhaler. The chamber holds the medicine before you inhale it. That way, you can inhale the medicine in as many breaths as you need. Doctors recommend using a spacer with most metered-dose inhalers, especially those with corticosteroid medicines. Follow-up care is a key part of your treatment and safety. Be sure to make and go to all appointments, and call your doctor if you are having problems. It's also a good idea to know your test results and keep a list of the medicines you take. How can you care for yourself at home? To get started using your inhaler · Talk with your health care provider to be sure you are using your inhaler the right way. It might help if you practice using it in front of a mirror. Use the inhaler exactly as prescribed. · Check that you have the correct medicine. If you use more than one inhaler, put a label on each one. This will let you know which one to use at the right time. · Keep track of how much medicine is in the inhaler. Check the label to see how many doses are in the container. If you know how many puffs you can take, you can replace the inhaler before you run out. Ask your health care provider how you can keep track of how much medicine is left. · Use a spacer if you have problems pressing the inhaler and breathing in at the same time. You also may need a spacer if you are using corticosteroid medicines. · If you are using a corticosteroid inhaler, gargle and rinse out your mouth with water after use. Do not swallow the water. Swallowing the water will increase the chance that the medicine will get into your bloodstream. This may make it more likely that you will have side effects. To use a spacer with an inhaler 1. Shake the inhaler. Remove the inhaler cap, and place the mouthpiece of the inhaler into the spacer. Check the inhaler instructions to see if you need to prime your inhaler before you use it. If it needs priming, follow the instructions on how to prime your inhaler. 2. Remove the cap from the spacer. 3. Hold the inhaler upright with the mouthpiece at the bottom. 4. Tilt your head back a little, and breathe out slowly and completely. 5. Place the spacer's mouthpiece in your mouth. 6. Press down on the inhaler to spray one puff of medicine into the spacer, and then start breathing in slowly. Wait to inhale until after you have pressed down on the inhaler. Some spacers have a whistle. If you hear it, you should breathe in more slowly. 7. Hold your breath for 10 seconds. This will let the medicine settle in your lungs. 8. If you need to take a second dose, wait 30 to 60 seconds to allow the inhaler valve to refill. To use an inhaler without a spacer 1. Shake the inhaler as directed. Remove the cap. Check the instructions to see if you need to prime your inhaler before you use it. If it needs priming, follow the instructions on how to prime your inhaler. 2. Hold the inhaler upright with the mouthpiece at the bottom. 3. Tilt your head back a little, and breathe out slowly and completely. 4. Position the inhaler in one of two ways: 
? You can place the inhaler in your mouth. This is easier for most people. And it lowers the risk that any of the medicine will get into your eyes.  
? Or you can place the inhaler 1 to 2 inches in front of your open mouth, without closing your lips over it. Try to open your mouth as wide as you can. Placing the inhaler in front of your open mouth may be better for getting the medicine into your lungs. But some people may find this too hard to do. 5. Start taking slow, even breaths through your mouth. Press down on the inhaler once, then inhale fully. 6. Hold your breath for 10 seconds. This will let the medicine settle in your lungs. 7. If you need to take a second dose, wait 30 to 60 seconds to allow the inhaler valve to refill. Where can you learn more? Go to http://mishel-orville.info/. Enter K111 in the search box to learn more about \"Using a Metered-Dose Inhaler: Care Instructions. \" Current as of: September 5, 2018 Content Version: 11.9 © 9232-8963 [employer]. Care instructions adapted under license by [employer] (which disclaims liability or warranty for this information). If you have questions about a medical condition or this instruction, always ask your healthcare professional. [place] [address] any warranty or liability for your use of this information.

## 2019-05-01 NOTE — PROGRESS NOTES
Subjective: This note will not be viewable in [address] 
 
 
 
49 y.o. female for annual Comprehensive personal healthcare examination. Mrs. Remedios Aleman is a 69-year-old  female who presents to the office today for complete checkup. The patient has anxiety associated with panic for which she has done well on her Xanax. This controls almost all of her symptoms. She has not had no severe attacks for some time. She has had no side effects related to her medication use. She has mild intermittent asthma with periodic exacerbation and uses an albuterol inhaler on a as needed basis. She has had at least one attack during the course of this allergy season. She denies any purulent sputum production or shortness of breath. She does continue to smoke a half a pack of cigarettes per day which she is done for a total of 30 years. She does have fibromyalgia. She currently is not on any medication for this. She seems to be doing fairly well although she is stiff and does have pain in her back and neck most days of the week. GERD is managed on PPI therapy. She denies breakthrough heartburn and is had no dysphasia, early satiety or unexplained weight loss. Hypercholesterolemia is currently managed on pravastatin therapy. She denies muscle soreness or GI upset. She has no history of coronary artery disease and denies exertional chest pains or claudication. History of present illness: This patient has multiple medical problems. These include:  
Patient Active Problem List  
Diagnosis Code  Acne vulgaris L70.0  Severe anxiety with panic F41.0  Mild intermittent asthma without complication N91.56  Cigarette nicotine dependence without complication Z20.560  
 Fibromyalgia M79.7  GERD without esophagitis K21.9  Hypercholesterolemia E78.00  Lingual tonsillitis J03.90  Menopause Z78.0 Past Medical History:  
Diagnosis Date  Acne vulgaris 8/10/2017  Anxiety 10/2/2017  Cigarette nicotine dependence without complication 0/00/4905  Fibromyalgia 8/10/2017  GERD without esophagitis 8/10/2017  Hypercholesterolemia 8/10/2017  Lingual tonsillitis 8/10/2017  Menopause 8/10/2017  Mild intermittent asthma without complication 2/39/9885  Severe anxiety with panic 8/10/2017 Past Surgical History:  
Procedure Laterality Date  IMPLANT BREAST SILICONE/EQ Bilateral 6019  
 subglandular Allergies Allergen Reactions  Pcn [Penicillins] Hives  Sulfa (Sulfonamide Antibiotics) Hives Current Outpatient Medications Medication Sig Dispense Refill  estradiol-norethindrone (ACTIVELLA) 0.5-0.1 mg per tablet TAKE 1 TABLET BY MOUTH EVERY DAY  7  
 ALPRAZolam (XANAX) 0.5 mg tablet TAKE 1 TABLET THREE TIMES DAILY IF NEEDED FOR ANXIETY 90 Tab 0  
 doxycycline (VIBRAMYCIN) 100 mg capsule TAKE 1 CAPSULE TWICE A DAY WITH FOOD 60 Cap 1  
 pravastatin (PRAVACHOL) 20 mg tablet TAKE 1 (ONE) TABLET TABLET, ORAL, DAILY TO HELP LOWER CHOLESTEROL 90 Tab 3  
 PROAIR HFA 90 mcg/actuation inhaler USE 2 PUFFS EVERY FOUR HOURS IF NEEDED 1 Inhaler 3  
 ERGOCALCIFEROL, VITAMIN D2, (VITAMIN D2 PO) Take  by mouth.  pantoprazole (PROTONIX) 40 mg tablet Take 40 mg by mouth daily.  Biotin 2,500 mcg cap Take  by mouth. Social History Socioeconomic History  Marital status:  Spouse name: Not on file  Number of children: Not on file  Years of education: Not on file  Highest education level: Not on file Tobacco Use  Smoking status: Current Every Day Smoker Packs/day: 0.50 Years: 30.00 Pack years: 15.00  Smokeless tobacco: Never Used Substance and Sexual Activity  Alcohol use: Yes Alcohol/week: 7.2 oz Types: 12 Cans of beer per week  Drug use: No  
 
Family History Problem Relation Age of Onset  Cancer Mother   
     skin, throat [facility] Arthritis-osteo Mother  Anxiety Mother  No Known Problems Father Health Maintenance Topic Date Due  Pneumococcal 0-64 years (1 of 1 - PPSV23) 04/05/1972  
 DTaP/Tdap/Td series (1 - Tdap) 04/05/1987  PAP AKA CERVICAL CYTOLOGY  04/05/1987  Shingrix Vaccine Age 50> (1 of 2) 04/05/2016  Influenza Age 5 to Adult  08/01/2019  BREAST CANCER SCRN MAMMOGRAM  02/13/2021  Cologuard Q 3 Years  08/23/2021 Review of Systems Constitutional:  She denies fever, weight loss, sweats or fatigue. HEENT:  No blurred or double vision, headache or dizziness. No difficulty with swallowing, taste, speech or smell. Respiratory:  No cough, wheezing or shortness of breath. No sputum production. Cardiac:  Denies chest pain, palpitations, unexplained indigestion, syncope, edema, PND or orthopnea. GI:  No changes in bowel movements, no abdominal pain, no bloating, anorexia, nausea, vomiting or heartburn. :  No frequency or dysuria. Denies incontinence. Extremities:  No joint pain, stiffness or swelling. Skin:  No recent rashes or mole changes. Neurological:  No numbness, tingling, burning paresthesias or loss of motor strength. No syncope, dizziness, frequent headaches or memory loss. ROS otherwise negative Objective:  
 
Vitals:  
 05/01/19 0930 BP: 122/72 Pulse: 74 Resp: 16 Temp: 98.1 °F (36.7 °C) TempSrc: Oral  
SpO2: 98% Weight: 129 lb 9.6 oz (58.8 kg) Height: 5' 4.5\" (1.638 m) PainSc:   5 PainLoc: Throat Body mass index is 21.9 kg/m². Physical Examination:  
General Appearance:  Well-developed, well-nourished, no acute distress. Vision:  Deferred to ophthalmologist.      
HEENT:   
Ears:  The TMs and ear canals were clear. Eyes:  The pupillary responses were normal.  Extraocular muscle function intact. Lids and conjunctiva not injected. No conjunctiva redness or drainage. Pharynx:  Clear with teeth in good repair. No masses were noted. Neck:  Supple without thyromegaly or adenopathy. No JVD noted. No carotid bruits. Lungs:  Clear to auscultation and percussion. Cardiac:  Regular rate and rhythm without murmur. PMI not displaced. No gallop, rub or click. Abdomen:  Flat, soft, non-tender without palpable organomegaly or mass. No pulsatile mass was felt, and no bruit was heard. Bowel sounds were active. Breasts:  Deferred to GYN 
GYN: Deferred to GYN Rectal exam: Deferred to GYN Extremities:  No clubbing, cyanosis or edema. Pulses:  Dorsalis pedis and posterior tibial pulses felt without difficulty. Skin:  No rash or unusual mole changes noted. Lymph Nodes:  None felt in the cervical, supraclavicular, axillary or inguinal region. Neurological:  Cranial nerves II-XII grossly intact. Motor strength 5/5. DTRs 2+ and symmetric. Station and gait normal. 
Physical exam otherwise negative Assessment/Plan:  
 
Diagnoses and all orders for this visit: 
 
Routine physical examination 
-     COLLECTION VENOUS BLOOD,VENIPUNCTURE 
-     CBC WITH AUTOMATED DIFF 
-     LIPID PANEL 
-     METABOLIC PANEL, COMPREHENSIVE 
-     TSH 3RD GENERATION 
-     URINALYSIS W/MICROSCOPIC Hypercholesterolemia Mild intermittent asthma without complication Severe anxiety with panic GERD without esophagitis Fibromyalgia Cigarette nicotine dependence without complication Acne vulgaris Other instructions: The patient's medications were reviewed and reconciled. No change in her current medical regimen is made. A prudent diet and exercise are encouraged. Smoking cessation was discussed with patient today. She is up-to-date on Pap testing and will have a copy of the last report sent to us. Tdap vaccination and shingles vaccination were recommended which she will consider. Await results of multiple labs Follow-up 6 months Follow-up and Dispositions · Return in about 6 months (around 11/1/2019). Sandro Ornelas MD

## 2019-05-01 NOTE — PROGRESS NOTES
Roland Pink is a 49 y.o. female presenting for Complete Physical 
. 1. Have you been to the ER, urgent care clinic since your last visit? Hospitalized since your last visit? No 
 
2. Have you seen or consulted any other health care providers outside of the [address] [place] [address] since your last visit? Include any pap smears or colon screening. No 
 
No flowsheet data found. No flowsheet data found. 3 most recent PHQ Screens 2/15/2019 Little interest or pleasure in doing things Not at all Feeling down, depressed, irritable, or hopeless Not at all Total Score PHQ 2 0 There are no discontinued medications.

## 2019-05-03 ENCOUNTER — TELEPHONE (OUTPATIENT)
Dept: INTERNAL MEDICINE CLINIC | Age: 54
End: 2019-05-03

## 2019-05-03 NOTE — TELEPHONE ENCOUNTER
Patient is calling, she states that since Wednesday she has gotten a cough, stuffy nose, and headache. She is requesting to have a zpac called into the pharmacy on file.     Best call back # for patient: [phone]  Pharmacy on file verified

## 2019-05-04 LAB — TSH SERPL DL<=0.05 MIU/L-ACNC: 1.01 UIU/ML (ref 0.34–5.6)

## 2019-05-16 ENCOUNTER — TELEPHONE (OUTPATIENT)
Dept: INTERNAL MEDICINE CLINIC | Age: 54
End: 2019-05-16

## 2019-05-16 NOTE — TELEPHONE ENCOUNTER
Patient life insurance company is denying her coverage based on how they interpreted her office notes. It was advised that the patient find ou what they need weather it be a letter or notes so we can send it to them. She states she would look in to it and get back with us.

## 2019-05-21 DIAGNOSIS — F41.0 SEVERE ANXIETY WITH PANIC: ICD-10-CM

## 2019-05-22 RX ORDER — ALPRAZOLAM 0.5 MG/1
TABLET ORAL
Qty: 90 TAB | Refills: 0 | Status: SHIPPED | OUTPATIENT
Start: 2019-05-22 | End: 2019-07-19 | Stop reason: SDUPTHER

## 2019-07-19 DIAGNOSIS — F41.0 SEVERE ANXIETY WITH PANIC: ICD-10-CM

## 2019-07-19 RX ORDER — ALPRAZOLAM 0.5 MG/1
TABLET ORAL
Qty: 90 TAB | Refills: 0 | Status: SHIPPED | OUTPATIENT
Start: 2019-07-19 | End: 2019-08-27 | Stop reason: SDUPTHER

## 2019-08-05 ENCOUNTER — TELEPHONE (OUTPATIENT)
Dept: INTERNAL MEDICINE CLINIC | Age: 54
End: 2019-08-05

## 2019-08-05 NOTE — TELEPHONE ENCOUNTER
Spoke with patient and she states the spot in on her arm it is not painful only itches. Advised patient she may want  To try zyrtec and some anti itch cream advised if no better will need to come in for an appointment .

## 2019-08-05 NOTE — TELEPHONE ENCOUNTER
Patient phoned states she has shingles again for the third time and would like something phoned in if possible.

## 2019-08-27 DIAGNOSIS — F41.0 SEVERE ANXIETY WITH PANIC: ICD-10-CM

## 2019-08-27 RX ORDER — ALPRAZOLAM 0.5 MG/1
0.5 TABLET ORAL
Qty: 90 TAB | Refills: 0 | Status: SHIPPED | OUTPATIENT
Start: 2019-08-27 | End: 2019-09-17 | Stop reason: SDUPTHER

## 2019-08-27 NOTE — TELEPHONE ENCOUNTER
Last Refill: 7/19/19  Last visit:5/1/2019      Requested Prescriptions     Pending Prescriptions Disp Refills    ALPRAZolam (XANAX) 0.5 mg tablet 90 Tab 0     Sig: Take 1 Tab by mouth three (3) times daily as needed for Anxiety.  Max Daily Amount: 1.5 mg.

## 2019-09-17 DIAGNOSIS — F41.0 SEVERE ANXIETY WITH PANIC: ICD-10-CM

## 2019-09-17 RX ORDER — ALPRAZOLAM 0.5 MG/1
TABLET ORAL
Qty: 90 TAB | Refills: 0 | Status: SHIPPED | OUTPATIENT
Start: 2019-09-17 | End: 2019-11-06 | Stop reason: SDUPTHER

## 2019-10-15 RX ORDER — ALBUTEROL SULFATE 90 UG/1
AEROSOL, METERED RESPIRATORY (INHALATION)
Qty: 8.5 G | Refills: 3 | Status: SHIPPED | OUTPATIENT
Start: 2019-10-15 | End: 2020-12-23

## 2019-11-06 ENCOUNTER — OFFICE VISIT (OUTPATIENT)
Dept: INTERNAL MEDICINE CLINIC | Age: 54
End: 2019-11-06

## 2019-11-06 VITALS
HEART RATE: 69 BPM | TEMPERATURE: 98.2 F | HEIGHT: 65 IN | RESPIRATION RATE: 14 BRPM | BODY MASS INDEX: 22.49 KG/M2 | WEIGHT: 135 LBS | OXYGEN SATURATION: 98 % | DIASTOLIC BLOOD PRESSURE: 78 MMHG | SYSTOLIC BLOOD PRESSURE: 116 MMHG

## 2019-11-06 DIAGNOSIS — M25.562 ACUTE PAIN OF BOTH KNEES: ICD-10-CM

## 2019-11-06 DIAGNOSIS — M25.561 ACUTE PAIN OF BOTH KNEES: ICD-10-CM

## 2019-11-06 DIAGNOSIS — F41.0 SEVERE ANXIETY WITH PANIC: Primary | ICD-10-CM

## 2019-11-06 PROBLEM — J03.90 LINGUAL TONSILLITIS: Status: RESOLVED | Noted: 2017-08-10 | Resolved: 2019-11-06

## 2019-11-06 RX ORDER — NABUMETONE 500 MG/1
500 TABLET, FILM COATED ORAL 2 TIMES DAILY
Qty: 60 TAB | Refills: 0 | Status: SHIPPED | OUTPATIENT
Start: 2019-11-06 | End: 2019-12-06

## 2019-11-06 RX ORDER — ALPRAZOLAM 0.5 MG/1
TABLET ORAL
Qty: 90 TAB | Refills: 0 | Status: SHIPPED | OUTPATIENT
Start: 2019-11-06 | End: 2019-12-15 | Stop reason: SDUPTHER

## 2019-11-06 NOTE — PROGRESS NOTES
Cheryl Lopez is a 49 y.o. female presenting for Follow Up Chronic Condition (6 mo fu) and Knee Pain (both)  . 1. Have you been to the ER, urgent care clinic since your last visit? Hospitalized since your last visit? No    2. Have you seen or consulted any other health care providers outside of the [address] [place] [address] since your last visit? Include any pap smears or colon screening. Phychiatrist every 6 months     No flowsheet data found. No flowsheet data found. 3 most recent PHQ Screens 2/15/2019   Little interest or pleasure in doing things Not at all   Feeling down, depressed, irritable, or hopeless Not at all   Total Score PHQ 2 0       There are no discontinued medications.

## 2019-11-06 NOTE — PATIENT INSTRUCTIONS

## 2019-11-06 NOTE — LETTER
Dori Hill :1965 MR #:[id number] Provider Renae Rodney MD  
*PSSJ-651* BSMG-491 () Page 1 of 5 Initial [employer] CONTROLLED SUBSTANCE AGREEMENT I may be prescribed medications that are controlled substances as part  of my treatment plan for management of my medical condition(s). The goal of my treatment plan is to maintain and/or improve my health and wellbeing. Because controlled substances have an increased risk of abuse or harm, continual re-evaluation is needed determine if the goals of my treatment plan are being met for my safety and the safety of others. [place]  am entering into this Controlled Substance Agreement with my provider, Juan Patino MD at [facility] . I understand that successful treatment requires mutual trust and honesty between me and my provider. I understand that there are state and federal laws and regulations which apply to the medications that my provider may prescribe that must be followed. I understand there are risks and benefits ts of taking the medicines that my provider may prescribe. I understand and agree that following this Agreement is necessary in continuing my provider-patient relationship and success of my treatment plan. As a part of my treatment plan, I agree to the following: COMMUNICATION: 
 
1. I will communicate fully with my provider about my medical condition(s), including the effect on my daily life and how well my medications are helping. I will tell my provider all of the medications that I take for any reason, including medications I receive from another health care provider, and will notify my provider about all issues, problems or concerns, including any side effects, which may be related to my medications.  
 
I understand that this information allows my provider to adjust my treatment plan to help manage my medical condition. I understand that this information will become part of my permanent medical record. 2. I will notify my provider if I have a history of alcohol/drug misuse/addiction or if I have had treatment for alcohol/drug addiction in the past, or if I have a new problem with or concern about alcohol/drug use/addiction, because this increases the likelihood of high risk behaviors and may lead to serious medical conditions. 3. Females Only: I will notify my provider if I am or become pregnant, or if I intend to become pregnant, or if I intend to breastfeed. I understand that communication of these issues with my provider is important, due to possible effects my medication could have on an unborn fetus or breastfeeding child. Cynthia Goldsmith :1965 MR #:[id number] Provider Kourtney Juarez MD  
*WPKG-521* BSMG-491 () Page 2 of 5 Initial [employer] MISUSE OF MEDICATIONS / DRUGS: 
 
1. I agree to take all controlled substances as prescribed, and will not misuse or abuse any controlled substances prescribed by my provider. For my safety, I will not increase the amount of medicine I take without first talking with and getting permission from my provider. 2. If I have a medical emergency, another health care provider may prescribe me medication. If I seek emergency treatment, I will notify my provider within seventy-two (72) hours. 3. I understand that my provider may discuss my use and/or possible misuse/abuse of controlled substances and alcohol, as appropriate, with any health care provider involved in my care, pharmacist or legal authority. ILLEGAL DRUGS: 
 
1. I will not use illegal drugs of any kind, including but not limited to marijuana, heroin, cocaine, or any prescription drug which is not prescribed to me.  
 
DRUG DIVERSION / PRESCRIPTION FRAUD: 
 
 1. I will not share, sell, trade, give away, or otherwise misuse my prescriptions or medications. 2. I will not alter any prescriptions provided to me by my provider. SINGLE PROVIDER: 
 
1. I agree that all controlled substances that I take will be prescribed only by my provider (or his/her covering provider) under this Agreement. This agreement does not prevent me from seeking emergency medical treatment or receiving pain management related to a surgery. PROTECTING MEDICATIONS: 
 
1. I am responsible for keeping my prescriptions and medications in a safe and secure place including safeguarding them from loss or theft. I understand that lost, stolen or damaged/destroyed prescriptions or medications will not be replaced. Cheryle Provencal :1965 MR #:[id number] Provider Libby Hidalgo MD  
*ZLEE-831* BSMG-491 () Page 3 of 5 Initial [place] PRESCRIPTION RENEWALS/REFILLS: 
 
 
1. I authorize my provider and my pharmacy to cooperate fully with any local, state, or federal law enforcement agency in the investigation of any possible misuse, sale, or other diversion of my controlled substance prescriptions or medications. RISKS: 
 
 
1. I understand that if I do not adhere to this Agreement in any way, my provider may change my prescriptions, stop prescribing controlled substances or end our provider-patient relationship. 2. If my provider decides to stop prescribing medication, or decides to end our provider-patient relationship,my provider may require that I taper my medications slowly. If necessary, my provider may also provide a prescription for other medications to treat my withdrawal symptoms. UNDERSTANDING THIS AGREEMENT: 
 
I understand that my provider may adjust or stop my prescriptions for controlled substances based on my medical condition and my treatment plan. I understand that this Agreement does not guarantee that I will be prescribed medications or controlled substances. I understand that controlled substances may be just one part of my treatment plan. My initial on each page and my signature below shows that I have read each page of this Agreement, I have had an opportunity to ask questions, and all of my questions have been answered to my satisfaction by my provider. By signing below, I agree to comply with this Agreement, and I understand that if I do not follow the Agreements listed above, my provider may stop 
 
 
 
_________________________________________  Date/Time 11/6/2019 9:49 AM   
             (Patient Signature)

## 2019-11-06 NOTE — PROGRESS NOTES
This note will not be viewable in [address] Subjective:     Mrs. Sharita Carr returns to the office today in follow-up of her anxiety/panic and also complaining of bilateral knee pain. The patient has a long-term history of anxiety/panic which affects her on a daily basis. She continues to be disabled by this as she has been unable to leave the home due to the frequent recurrent bouts of this problem. She takes Xanax up to 3 times a day in order to make her life livable. Triggers of her Xanax include being out in traffic, crowds, etc. she has been unable to work gainfully because of the recurrent nature of these attacks which are usually always precipitated by any type of stress. Patient notes that she gets short-term benefit from the Xanax but a lot of time it will prevent her from going into a panic attack. The patient has been compliant and returning for follow-ups in regards to this problem and the usage of her Xanax medication. Patient also continued complains of bilateral knee pain. It affects her right greater than left knee. There is been no activity which has precipitated her knee discomfort. She notes a crunching sensation when she flexes and extends her knees. She denies any leg swelling. She notes that some over-the-counter anti-inflammatory medications upset her stomach and thus has not been taking anything for this.     Past Medical History:   Diagnosis Date    Acne vulgaris 8/10/2017    Cigarette nicotine dependence without complication 8/59/1196    Fibromyalgia 8/10/2017    GERD without esophagitis 8/10/2017    Hypercholesterolemia 8/10/2017    Lingual tonsillitis 8/10/2017    Menopause 8/10/2017    Mild intermittent asthma without complication 2/43/4721    Severe anxiety with panic 8/10/2017     Past Surgical History:   Procedure Laterality Date    IMPLANT BREAST SILICONE/EQ Bilateral 3257    subglandular     Allergies   Allergen Reactions    Pcn [Penicillins] Hives    Sulfa (Sulfonamide Antibiotics) Hives     Current Outpatient Medications   Medication Sig Dispense Refill    nabumetone (RELAFEN) 500 mg tablet Take 1 Tab by mouth two (2) times a day for 30 days. 60 Tab 0    ALPRAZolam (XANAX) 0.5 mg tablet TAKE 1 TABLET BY MOUTH THREE TIMES DAILY IF NEEDED FOR ANXIETY 90 Tab 0    PROAIR HFA 90 mcg/actuation inhaler USE 2 PUFFS EVERY FOUR HOURS IF NEEDED FOR SHORTNESS OF BREATH OR WHEEZING 8.5 g 3    doxycycline (VIBRAMYCIN) 100 mg capsule TAKE 1 CAPSULE TWICE A DAY WITH FOOD 60 Cap 1    pravastatin (PRAVACHOL) 20 mg tablet TAKE 1 (ONE) TABLET TABLET, ORAL, DAILY TO HELP LOWER CHOLESTEROL 90 Tab 3    Biotin 2,500 mcg cap Take  by mouth. Social History     Socioeconomic History    Marital status:      Spouse name: Not on file    Number of children: Not on file    Years of education: Not on file    Highest education level: Not on file   Tobacco Use    Smoking status: Current Every Day Smoker     Packs/day: 0.50     Years: 30.00     Pack years: 15.00    Smokeless tobacco: Never Used   Substance and Sexual Activity    Alcohol use:  Yes     Alcohol/week: 12.0 standard drinks     Types: 12 Cans of beer per week    Drug use: No     Family History   Problem Relation Age of Onset    Cancer Mother         skin, throat    Arthritis-osteo Mother     Anxiety Mother     No Known Problems Father        Review of Systems:  GEN: no weight loss, weight gain, fatigue or night sweats  CV: no PND, orthopnea, or palpitations  Resp: no dyspnea on exertion, no cough  Abd: no nausea, vomiting or diarrhea  EXT: Positive for bilateral knee pain  Psych: Positive for daily anxiety/panic  Neurological ROS: no TIA or stroke symptoms  ROS otherwise negative      Objective:     Visit Vitals  /78 (BP 1 Location: Left arm, BP Patient Position: Sitting)   Pulse 69   Temp 98.2 °F (36.8 °C) (Oral)   Resp 14   Ht 5' 4.5\" (1.638 m)   Wt 135 lb (61.2 kg)   SpO2 98%   BMI 22.81 kg/m² Body mass index is 22.81 kg/m². General:   alert, cooperative and no distress   Psych  she is alert and oriented. Mood appears normal and there is no tremulousness noted. She has good insight into her anxiety/depression. There is no irritability. Extremities: No edema or cyanosis. Range of motion of both knees is normal but crepitation is felt with movement. There is no ballotable subpatellar effusion and no pain with palpation along the joint line   Neuro: ..alert, oriented x3,speech normal in context and clarity, cranial nerves II-XII intact,motor strength: full proximally and distally,gait: normal  reflexes: full and symmetric     Physical exam otherwise negative         Assessment/Plan:     Diagnoses and all orders for this visit:    Severe anxiety with panic  -     ALPRAZolam (XANAX) 0.5 mg tablet; TAKE 1 TABLET BY MOUTH THREE TIMES DAILY IF NEEDED FOR ANXIETY, NormalGeneric For:XANAX  0.5MGDisp-90 Tab, R-0    Acute pain of both knees  -     nabumetone (RELAFEN) 500 mg tablet; Take 1 Tab by mouth two (2) times a day for 30 days. , Normal, Disp-60 Tab, R-0        Other instructions: The patient's medications were reviewed and reconciled. She continues to be disabled related to her recurrent anxiety/panic which occurs on a daily basis and prohibits her from leaving her home. She will continue her Xanax as prescribed. A controlled substance agreement is completed today. Relafen has been prescribed for her knee pain and if no improvement is noted I have recommended orthopedic evaluation    Follow-up 6 months    Follow-up and Dispositions    · Return in about 6 months (around 5/6/2020).          Hector Valladares MD

## 2019-12-15 DIAGNOSIS — F41.0 SEVERE ANXIETY WITH PANIC: ICD-10-CM

## 2019-12-16 RX ORDER — PRAVASTATIN SODIUM 20 MG/1
TABLET ORAL
Qty: 90 TAB | Refills: 3 | Status: SHIPPED | OUTPATIENT
Start: 2019-12-16 | End: 2020-11-28

## 2019-12-16 RX ORDER — ALPRAZOLAM 0.5 MG/1
TABLET ORAL
Qty: 90 TAB | Refills: 0 | Status: SHIPPED | OUTPATIENT
Start: 2019-12-16 | End: 2019-12-19

## 2019-12-19 DIAGNOSIS — F41.0 SEVERE ANXIETY WITH PANIC: ICD-10-CM

## 2019-12-19 RX ORDER — ALPRAZOLAM 0.5 MG/1
TABLET ORAL
Qty: 90 TAB | Refills: 0 | Status: SHIPPED | OUTPATIENT
Start: 2019-12-19 | End: 2020-01-20

## 2020-01-20 DIAGNOSIS — F41.0 SEVERE ANXIETY WITH PANIC: ICD-10-CM

## 2020-01-20 RX ORDER — ALPRAZOLAM 0.5 MG/1
TABLET ORAL
Qty: 90 TAB | Refills: 0 | Status: SHIPPED | OUTPATIENT
Start: 2020-01-20 | End: 2020-03-10

## 2020-03-03 RX ORDER — DOXYCYCLINE 100 MG/1
CAPSULE ORAL
Qty: 60 CAP | Refills: 0 | Status: SHIPPED | OUTPATIENT
Start: 2020-03-03 | End: 2021-05-11

## 2020-03-10 DIAGNOSIS — F41.0 SEVERE ANXIETY WITH PANIC: ICD-10-CM

## 2020-03-10 RX ORDER — ALPRAZOLAM 0.5 MG/1
TABLET ORAL
Qty: 90 TAB | Refills: 0 | Status: SHIPPED | OUTPATIENT
Start: 2020-03-10 | End: 2020-05-14 | Stop reason: SDUPTHER

## 2020-05-14 ENCOUNTER — VIRTUAL VISIT (OUTPATIENT)
Dept: INTERNAL MEDICINE CLINIC | Age: 55
End: 2020-05-14

## 2020-05-14 VITALS — HEIGHT: 65 IN | BODY MASS INDEX: 22.49 KG/M2 | TEMPERATURE: 98.6 F | WEIGHT: 135 LBS

## 2020-05-14 DIAGNOSIS — J45.20 MILD INTERMITTENT ASTHMA WITHOUT COMPLICATION: Primary | ICD-10-CM

## 2020-05-14 DIAGNOSIS — E78.00 HYPERCHOLESTEROLEMIA: ICD-10-CM

## 2020-05-14 DIAGNOSIS — F17.210 CIGARETTE NICOTINE DEPENDENCE WITHOUT COMPLICATION: ICD-10-CM

## 2020-05-14 DIAGNOSIS — F41.0 SEVERE ANXIETY WITH PANIC: ICD-10-CM

## 2020-05-14 DIAGNOSIS — J01.01 ACUTE RECURRENT MAXILLARY SINUSITIS: ICD-10-CM

## 2020-05-14 RX ORDER — ALPRAZOLAM 0.5 MG/1
TABLET ORAL
Qty: 90 TAB | Refills: 0 | Status: SHIPPED | OUTPATIENT
Start: 2020-05-14 | End: 2020-06-04

## 2020-05-14 RX ORDER — ESTRADIOL 0.5 MG/1
0.5 TABLET ORAL DAILY
COMMUNITY
End: 2021-12-29 | Stop reason: ALTCHOICE

## 2020-05-14 RX ORDER — AZITHROMYCIN 250 MG/1
250 TABLET, FILM COATED ORAL SEE ADMIN INSTRUCTIONS
Qty: 6 TAB | Refills: 0 | Status: SHIPPED | OUTPATIENT
Start: 2020-05-14 | End: 2020-10-20 | Stop reason: ALTCHOICE

## 2020-05-14 NOTE — PROGRESS NOTES
Michelle Whelan is a 48 y.o. female presenting for Follow Up Chronic Condition (6 mo fu) and Sinus Infection  . 1. Have you been to the ER, urgent care clinic since your last visit? Hospitalized since your last visit? No    2. Have you seen or consulted any other health care providers outside of the [address] [place] [address] since your last visit? Include any pap smears or colon screening. No    No flowsheet data found. No flowsheet data found. 3 most recent PHQ Screens 2/15/2019   Little interest or pleasure in doing things Not at all   Feeling down, depressed, irritable, or hopeless Not at all   Total Score PHQ 2 0       There are no discontinued medications.

## 2020-05-14 NOTE — PROGRESS NOTES
This note will not be viewable in [address] Consent: Yoly Pelletier, who was seen by synchronous (real-time) audio-video technology, and/or her healthcare decision maker, is aware that this patient-initiated, Telehealth encounter on 5/14/2020 is a billable service, with coverage as determined by her insurance carrier. She is aware that she may receive a bill and has provided verbal consent to proceed: Yes. Assessment & Plan:   Diagnoses and all orders for this visit:    1. Mild intermittent asthma without complication    2. Severe anxiety with panic  -     ALPRAZolam (XANAX) 0.5 mg tablet; TAKE 1 TABLET BY MOUTH THREE TIMES DAILY IF NEEDED FOR ANXIETY    3. Hypercholesterolemia    4. Cigarette nicotine dependence without complication    5. Acute recurrent maxillary sinusitis  -     azithromycin (ZITHROMAX) 250 mg tablet; Take 1 Tab by mouth See Admin Instructions. Follow-up and Dispositions    · Return in about 6 months (around 11/14/2020). 712  Subjective:   Yoly Pelletier is a 48 y.o. female who was seen for Follow Up Chronic Condition (6 mo fu) and Sinus Infection    Mrs. Cj Ovalle is seen via virtual video office visit due to the pandemic caused by coronavirus. The patient has severe anxiety with panic. She remains on Xanax which she takes 3 times daily which effectively works for her in controlling her symptoms. She has difficulty leaving her home and going out in public and has not driven in many years as a result of her anxiety. The medication does not cause any sedation or impairment and she has been doing well with it over time. She has mild intermittent asthma for which she uses an albuterol inhaler. She does continue to smoke. She has had no exacerbation of her symptoms. She denies shortness of breath. She has hypercholesterolemia currently on statin therapy. She tolerates this without muscle soreness or GI upset.   She has no history of vascular disease and denies exertional chest pains or claudication. The patient complains of an upper respiratory infection with the development of sinus congestion, maxillary discomfort and purulent sinus drainage. She has had recurrent sinus infections in the past.  She denies any shortness of breath or chest pains. She has had no fever or chills. There is been no neck stiffness or rash. Prior to Admission medications    Medication Sig Start Date End Date Taking? Authorizing Provider   estradioL (ESTRACE) 0.5 mg tablet Take 0.5 mg by mouth daily. Yes Provider, Historical   ALPRAZolam (XANAX) 0.5 mg tablet TAKE 1 TABLET BY MOUTH THREE TIMES DAILY IF NEEDED FOR ANXIETY 5/14/20  Yes Abdi Harrison MD   azithromycin (ZITHROMAX) 250 mg tablet Take 1 Tab by mouth See Admin Instructions. 5/14/20  Yes Abdi Harrison MD   doxycycline (VIBRAMYCIN) 100 mg capsule T1C BID CF 3/3/20  Yes Abdi Harrison MD   pravastatin (PRAVACHOL) 20 mg tablet TAKE 1 TABLET EVERY DAY FOR CHOLESTEROL 12/16/19  Yes Abdi Harrison MD   PROAIR HFA 90 mcg/actuation inhaler USE 2 PUFFS EVERY FOUR HOURS IF NEEDED FOR SHORTNESS OF BREATH OR WHEEZING 10/15/19  Yes Abdi Harrison MD   Biotin 2,500 mcg cap Take  by mouth.    Yes Provider, Historical     Allergies   Allergen Reactions    Pcn [Penicillins] Hives    Sulfa (Sulfonamide Antibiotics) Hives       Patient Active Problem List    Diagnosis Date Noted    Acute pain of both knees 11/06/2019     Priority: 1 - One    Severe anxiety with panic 08/10/2017     Priority: 1 - One    Mild intermittent asthma without complication 84/42/6638     Priority: 1 - One    Cigarette nicotine dependence without complication 58/69/5795     Priority: 1 - One    Fibromyalgia 08/10/2017     Priority: 1 - One    GERD without esophagitis 08/10/2017     Priority: 1 - One    Hypercholesterolemia 08/10/2017     Priority: 1 - One    Acne vulgaris 08/10/2017    Menopause 08/10/2017     Current Outpatient Medications   Medication Sig Dispense Refill    estradioL (ESTRACE) 0.5 mg tablet Take 0.5 mg by mouth daily.  ALPRAZolam (XANAX) 0.5 mg tablet TAKE 1 TABLET BY MOUTH THREE TIMES DAILY IF NEEDED FOR ANXIETY 90 Tab 0    azithromycin (ZITHROMAX) 250 mg tablet Take 1 Tab by mouth See Admin Instructions. 6 Tab 0    doxycycline (VIBRAMYCIN) 100 mg capsule T1C BID CF 60 Cap 0    pravastatin (PRAVACHOL) 20 mg tablet TAKE 1 TABLET EVERY DAY FOR CHOLESTEROL 90 Tab 3    PROAIR HFA 90 mcg/actuation inhaler USE 2 PUFFS EVERY FOUR HOURS IF NEEDED FOR SHORTNESS OF BREATH OR WHEEZING 8.5 g 3    Biotin 2,500 mcg cap Take  by mouth. Allergies   Allergen Reactions    Pcn [Penicillins] Hives    Sulfa (Sulfonamide Antibiotics) Hives     Social History     Tobacco Use    Smoking status: Current Every Day Smoker     Packs/day: 0.50     Years: 30.00     Pack years: 15.00    Smokeless tobacco: Never Used   Substance Use Topics    Alcohol use:  Yes     Alcohol/week: 12.0 standard drinks     Types: 12 Cans of beer per week       ROS:  Constitutional: negative for fevers, chills, anorexia and weight loss  Eyes:   negative for visual disturbance and irritation  ENT:   Positive for sinus congestion, maxillary discomfort and purulent drainage  Respiratory:  Positive for occasional cough and periodic wheezing related to postnasal drainage  CV:   negative for chest pain, palpitations, lower extremity edema  GI:   negative for nausea, vomiting, diarrhea, abdominal pain,melena  Endo:               negative for polyuria,polydipsia,polyphagia,heat intolerance  Genitourinary: negative for frequency, dysuria and hematuria  Integumentary: negative for rash and pruritus  Hematologic:  negative for easy bruising and gum/nose bleeding  Musculoskel: negative for myalgias, arthralgias, back pain, muscle weakness, joint pain  Neurological:  negative for headaches, dizziness, vertigo, memory problems and gait   Behavl/Psych: As it of for chronic recurrent anxiety  ROS otherwise negative        Objective:   Vital Signs: (As obtained by patient/caregiver at home)  Visit Vitals  Temp 98.6 °F (37 °C) (Oral)   Ht 5' 4.5\" (1.638 m) Comment: patient reported   Wt 135 lb (61.2 kg) Comment: patient reported   BMI 22.81 kg/m²        [INSTRUCTIONS:  \"[x]\" Indicates a positive item  \"[]\" Indicates a negative item  -- DELETE ALL ITEMS NOT EXAMINED]    Constitutional: [x] Appears well-developed and well-nourished [x] No apparent distress      [] Abnormal -     Mental status: [x] Alert and awake  [x] Oriented to person/place/time [x] Able to follow commands    [] Abnormal -     Eyes:   EOM    [x]  Normal    [] Abnormal -   Sclera  [x]  Normal    [] Abnormal -          Discharge [x]  None visible   [] Abnormal -     HENT: [x] Normocephalic, atraumatic  [] Abnormal -   [x] Mouth/Throat: Mucous membranes are moist    External Ears [x] Normal  [] Abnormal -    Neck: [x] No visualized mass [] Abnormal -     Pulmonary/Chest: [x] Respiratory effort normal   [x] No visualized signs of difficulty breathing or respiratory distress        [] Abnormal -      Musculoskeletal:   [] Normal gait with no signs of ataxia         [x] Normal range of motion of neck        [] Abnormal -     Neurological:        [x] No Facial Asymmetry (Cranial nerve 7 motor function) (limited exam due to video visit)          [] No gaze palsy        [] Abnormal -          Skin:        [x] No significant exanthematous lesions or discoloration noted on facial skin         [] Abnormal -            Psychiatric:       [x] Normal Affect [] Abnormal -        [x] No Hallucinations    Other pertinent observable physical exam findings:-None    The patient's medications were reviewed and reconciled. No change in her current medical regimen will be made.     A prudent diet and exercise is encouraged    Controlled Substance Monitoring:    RX Monitoring 5/14/2020   Periodic Controlled Substance Monitoring Possible medication side effects, risk of tolerance/dependence & alternative treatments discussed. ;No signs of potential drug abuse or diversion identified. A Z-William will be prescribed for her sinusitis and she was instructed also to try Mucinex and increase her p.o. fluids    Labs from 5/1/2019 were reviewed with the patient today. She will be due for follow-up labs when she returns for her next follow-up appointment    She is instructed to return in 6 months time for follow-up      We discussed the expected course, resolution and complications of the diagnosis(es) in detail. Medication risks, benefits, costs, interactions, and alternatives were discussed as indicated. I advised her to contact the office if her condition worsens, changes or fails to improve as anticipated. She expressed understanding with the diagnosis(es) and plan. Ishan Nunez is a 48 y.o. female being evaluated by a video visit encounter for concerns as above. A caregiver was present when appropriate. Due to this being a TeleHealth encounter (During EWHTE-05 public health emergency), evaluation of the following organ systems was limited: Vitals/Constitutional/EENT/Resp/CV/GI//MS/Neuro/Skin/Heme-Lymph-Imm. Pursuant to the emergency declaration under the [facility], 1135 waiver authority and the [employer] and [employer] Act, this Virtual  Visit was conducted, with patient's (and/or legal guardian's) consent, to reduce the patient's risk of exposure to COVID-19 and provide necessary medical care. Services were provided through a video synchronous discussion virtually to substitute for in-person clinic visit. Patient and provider were located at their individual homes.         Heri Briceno MD

## 2020-06-04 DIAGNOSIS — F41.0 SEVERE ANXIETY WITH PANIC: ICD-10-CM

## 2020-06-04 RX ORDER — ALPRAZOLAM 0.5 MG/1
TABLET ORAL
Qty: 90 TAB | Refills: 0 | Status: SHIPPED | OUTPATIENT
Start: 2020-06-04 | End: 2020-09-08

## 2020-09-08 DIAGNOSIS — F41.0 SEVERE ANXIETY WITH PANIC: ICD-10-CM

## 2020-09-08 RX ORDER — ALPRAZOLAM 0.5 MG/1
TABLET ORAL
Qty: 90 TAB | Refills: 0 | Status: SHIPPED | OUTPATIENT
Start: 2020-09-08 | End: 2020-12-10 | Stop reason: SDUPTHER

## 2020-10-19 ENCOUNTER — TELEPHONE (OUTPATIENT)
Dept: INTERNAL MEDICINE CLINIC | Age: 55
End: 2020-10-19

## 2020-10-20 ENCOUNTER — OFFICE VISIT (OUTPATIENT)
Dept: INTERNAL MEDICINE CLINIC | Age: 55
End: 2020-10-20
Payer: COMMERCIAL

## 2020-10-20 VITALS
HEART RATE: 92 BPM | DIASTOLIC BLOOD PRESSURE: 70 MMHG | OXYGEN SATURATION: 97 % | HEIGHT: 65 IN | RESPIRATION RATE: 20 BRPM | WEIGHT: 132.8 LBS | TEMPERATURE: 98.1 F | BODY MASS INDEX: 22.13 KG/M2 | SYSTOLIC BLOOD PRESSURE: 116 MMHG

## 2020-10-20 DIAGNOSIS — J20.9 ACUTE BRONCHITIS, UNSPECIFIED ORGANISM: Primary | ICD-10-CM

## 2020-10-20 PROCEDURE — 99213 OFFICE O/P EST LOW 20 MIN: CPT | Performed by: INTERNAL MEDICINE

## 2020-10-20 RX ORDER — LEVOFLOXACIN 500 MG/1
500 TABLET, FILM COATED ORAL DAILY
Qty: 7 TAB | Refills: 0 | Status: SHIPPED | OUTPATIENT
Start: 2020-10-20 | End: 2020-10-27

## 2020-10-20 NOTE — PATIENT INSTRUCTIONS
Bronchitis: Care Instructions Your Care Instructions Bronchitis is inflammation of the bronchial tubes, which carry air to the lungs. The tubes swell and produce mucus, or phlegm. The mucus and inflamed bronchial tubes make you cough. You may have trouble breathing. Most cases of bronchitis are caused by viruses like those that cause colds. Antibiotics usually do not help and they may be harmful. Bronchitis usually develops rapidly and lasts about 2 to 3 weeks in otherwise healthy people. Follow-up care is a key part of your treatment and safety. Be sure to make and go to all appointments, and call your doctor if you are having problems. It's also a good idea to know your test results and keep a list of the medicines you take. How can you care for yourself at home? · Take all medicines exactly as prescribed. Call your doctor if you think you are having a problem with your medicine. · Get some extra rest. 
· Take an over-the-counter pain medicine, such as acetaminophen (Tylenol), ibuprofen (Advil, Motrin), or naproxen (Aleve) to reduce fever and relieve body aches. Read and follow all instructions on the label. · Do not take two or more pain medicines at the same time unless the doctor told you to. Many pain medicines have acetaminophen, which is Tylenol. Too much acetaminophen (Tylenol) can be harmful. · Take an over-the-counter cough medicine that contains dextromethorphan to help quiet a dry, hacking cough so that you can sleep. Avoid cough medicines that have more than one active ingredient. Read and follow all instructions on the label. · Breathe moist air from a humidifier, hot shower, or sink filled with hot water. The heat and moisture will thin mucus so you can cough it out. · Do not smoke. Smoking can make bronchitis worse. If you need help quitting, talk to your doctor about stop-smoking programs and medicines. These can increase your chances of quitting for good. When should you call for help? Call 911 anytime you think you may need emergency care. For example, call if: 
  · You have severe trouble breathing. Call your doctor now or seek immediate medical care if: 
  · You have new or worse trouble breathing.  
  · You cough up dark brown or bloody mucus (sputum).  
  · You have a new or higher fever.  
  · You have a new rash. Watch closely for changes in your health, and be sure to contact your doctor if: 
  · You cough more deeply or more often, especially if you notice more mucus or a change in the color of your mucus.  
  · You are not getting better as expected. Where can you learn more? Go to http://mishel-orville.info/ Enter H333 in the search box to learn more about \"Bronchitis: Care Instructions. \" Current as of: February 24, 2020               Content Version: 12.6 © 7793-4759 [employer]. Care instructions adapted under license by [employer] (which disclaims liability or warranty for this information). If you have questions about a medical condition or this instruction, always ask your healthcare professional. [address] any warranty or liability for your use of this information.

## 2020-10-20 NOTE — PROGRESS NOTES
Nitin Marsh is a 48 y.o. female and presents with Cough  . Subjective:  Mrs. Rosa Bates presents to the office today with complaints of an upper respiratory infection ongoing over the last several days with sinus congestion drainage and a cough which has been productive of thickish phlegm. She denies fevers, chills, body aches or flulike symptoms. She has had no sore throat. She has noted some wheezing and has been using her inhaler. She denies any GI symptoms. There is been no loss of smell or taste. Past Medical History:   Diagnosis Date    Acne vulgaris 8/10/2017    Cigarette nicotine dependence without complication 1/00/2345    Fibromyalgia 8/10/2017    GERD without esophagitis 8/10/2017    Hypercholesterolemia 8/10/2017    Lingual tonsillitis 8/10/2017    Menopause 8/10/2017    Mild intermittent asthma without complication 5/08/2057    Severe anxiety with panic 8/10/2017     Past Surgical History:   Procedure Laterality Date    IMPLANT BREAST SILICONE/EQ Bilateral 2781    subglandular     Allergies   Allergen Reactions    Pcn [Penicillins] Hives    Sulfa (Sulfonamide Antibiotics) Hives     Current Outpatient Medications   Medication Sig Dispense Refill    levoFLOXacin (LEVAQUIN) 500 mg tablet Take 1 Tab by mouth daily for 7 days. 7 Tab 0    ALPRAZolam (XANAX) 0.5 mg tablet TAKE 1 TABLET BY MOUTH THREE TIMES A DAY IF NEEDED FOR ANXIETY 90 Tab 0    estradioL (ESTRACE) 0.5 mg tablet Take 0.5 mg by mouth daily.  doxycycline (VIBRAMYCIN) 100 mg capsule T1C BID CF 60 Cap 0    pravastatin (PRAVACHOL) 20 mg tablet TAKE 1 TABLET EVERY DAY FOR CHOLESTEROL 90 Tab 3    PROAIR HFA 90 mcg/actuation inhaler USE 2 PUFFS EVERY FOUR HOURS IF NEEDED FOR SHORTNESS OF BREATH OR WHEEZING 8.5 g 3    Biotin 2,500 mcg cap Take  by mouth.        Social History     Socioeconomic History    Marital status:      Spouse name: Not on file    Number of children: Not on file    Years of education: Not on file    Highest education level: Not on file   Tobacco Use    Smoking status: Current Every Day Smoker     Packs/day: 0.50     Years: 30.00     Pack years: 15.00    Smokeless tobacco: Never Used   Substance and Sexual Activity    Alcohol use: Yes     Alcohol/week: 12.0 standard drinks     Types: 12 Cans of beer per week    Drug use: No     Family History   Problem Relation Age of Onset    Cancer Mother         skin, throat    Arthritis-osteo Mother     Anxiety Mother     No Known Problems Father        Review of Systems  Constitutional: negative for fevers, chills, anorexia and weight loss  Eyes:   negative for visual disturbance and irritation  ENT:   Positive for some sinus congestion and post nasal drainage. Respiratory:  Positive for cough and chest congestion without wheezing  CV:   negative for chest pain, palpitations, lower extremity edema  GI:   negative for nausea, vomiting, diarrhea, abdominal pain,melena  Integumentary: negative for rash and pruritus  Neurological:  negative for headaches, dizziness, vertigo, memory problems and gait       Objective:  Visit Vitals  /70 (BP 1 Location: Left arm, BP Patient Position: Sitting)   Pulse 92   Temp 98.1 °F (36.7 °C) (Oral)   Resp 20   Ht 5' 4.5\" (1.638 m)   Wt 132 lb 12.8 oz (60.2 kg)   SpO2 97%   BMI 22.44 kg/m²     Body mass index is 22.44 kg/m². Physical Exam:   General appearance - alert, ill appearing, and in no distress  Mental status - alert, oriented to person, place, and time  EYE-STEPHANIE, EOMI, conjuctiva clear. No lid swelling or purulent drainage  ENT- TM's clear without A/F level.  Pharynx slightly erythematous with drainage noted  Nose - normal and patent, no erythema,  Neck - supple, no significant adenopathy   Chest - Coarse upper airway rhonchi present without wheezing   Heart - normal rate, regular rhythm, normal S1, S2, no murmurs, rubs, clicks or gallops   Skin-No rash appreciated  Neuro -alert, oriented, normal speech, no focal findings. Assessment/Plan:  Diagnoses and all orders for this visit:    Acute bronchitis, unspecified organism  -     levoFLOXacin (LEVAQUIN) 500 mg tablet; Take 1 Tab by mouth daily for 7 days. , Normal, Disp-7 Tab,R-0        Other Instructions:  Mucinex as directed    Increase po fluids    Follow-up and Dispositions    · Return if symptoms worsen or fail to improve. I have reviewed with the patient details of the assessment and plan and all questions were answered. Relevent patient education was performed. An After Visit Summary was printed and given to the patient. Jarrett Zuniga MD    Please note that this dictation was completed with [employer], the computer voice recognition software. Quite often unanticipated grammatical, syntax, homophones, and other interpretive errors are inadvertently transcribed by the computer software. Please disregard these errors. Please excuse any errors that have escaped final proofreading.

## 2020-10-20 NOTE — PROGRESS NOTES
Renny Ackerman is a 48 y.o. female presenting for Cough  . 1. Have you been to the ER, urgent care clinic since your last visit? Hospitalized since your last visit? No    2. Have you seen or consulted any other health care providers outside of the [address] [place] [address] since your last visit? Include any pap smears or colon screening. No    No flowsheet data found. No flowsheet data found. 3 most recent PHQ Screens 5/14/2020   Little interest or pleasure in doing things Not at all   Feeling down, depressed, irritable, or hopeless Not at all   Total Score PHQ 2 0       There are no discontinued medications.

## 2020-11-28 RX ORDER — PRAVASTATIN SODIUM 20 MG/1
TABLET ORAL
Qty: 90 TAB | Refills: 2 | Status: SHIPPED | OUTPATIENT
Start: 2020-11-28 | End: 2020-12-10 | Stop reason: SDUPTHER

## 2020-12-10 ENCOUNTER — OFFICE VISIT (OUTPATIENT)
Dept: INTERNAL MEDICINE CLINIC | Age: 55
End: 2020-12-10
Payer: COMMERCIAL

## 2020-12-10 VITALS
RESPIRATION RATE: 16 BRPM | HEIGHT: 65 IN | BODY MASS INDEX: 22.16 KG/M2 | WEIGHT: 133 LBS | DIASTOLIC BLOOD PRESSURE: 70 MMHG | OXYGEN SATURATION: 98 % | HEART RATE: 75 BPM | TEMPERATURE: 98.9 F | SYSTOLIC BLOOD PRESSURE: 116 MMHG

## 2020-12-10 DIAGNOSIS — M79.7 FIBROMYALGIA: Chronic | ICD-10-CM

## 2020-12-10 DIAGNOSIS — F17.210 CIGARETTE NICOTINE DEPENDENCE WITHOUT COMPLICATION: Chronic | ICD-10-CM

## 2020-12-10 DIAGNOSIS — F41.0 SEVERE ANXIETY WITH PANIC: Chronic | ICD-10-CM

## 2020-12-10 DIAGNOSIS — J45.20 MILD INTERMITTENT ASTHMA WITHOUT COMPLICATION: Chronic | ICD-10-CM

## 2020-12-10 DIAGNOSIS — N39.0 URINARY TRACT INFECTION WITHOUT HEMATURIA, SITE UNSPECIFIED: ICD-10-CM

## 2020-12-10 DIAGNOSIS — Z00.00 ROUTINE PHYSICAL EXAMINATION: Primary | ICD-10-CM

## 2020-12-10 DIAGNOSIS — E78.00 HYPERCHOLESTEROLEMIA: Chronic | ICD-10-CM

## 2020-12-10 DIAGNOSIS — K21.9 GERD WITHOUT ESOPHAGITIS: Chronic | ICD-10-CM

## 2020-12-10 LAB
A-G RATIO,AGRAT: 1.4 RATIO
ALBUMIN SERPL-MCNC: 4.2 G/DL (ref 3.9–5.4)
ALP SERPL-CCNC: 55 U/L (ref 38–126)
ALT SERPL-CCNC: 12 U/L (ref 0–35)
ANION GAP SERPL CALC-SCNC: 7 MMOL/L
AST SERPL W P-5'-P-CCNC: 26 U/L (ref 14–36)
BACTERIA,BACTU: ABNORMAL
BILIRUB SERPL-MCNC: 0.4 MG/DL (ref 0.2–1.3)
BILIRUB UR QL: NEGATIVE
BUN SERPL-MCNC: 10 MG/DL (ref 7–17)
BUN/CREATININE RATIO,BUCR: 17 RATIO
CALCIUM SERPL-MCNC: 9.1 MG/DL (ref 8.4–10.2)
CHLORIDE SERPL-SCNC: 105 MMOL/L (ref 98–107)
CHOL/HDL RATIO,CHHD: 3 RATIO (ref 0–4)
CHOLEST SERPL-MCNC: 205 MG/DL (ref 0–200)
CK SERPL-CCNC: 117 U/L (ref 30–135)
CLARITY: CLEAR
CO2 SERPL-SCNC: 27 MMOL/L (ref 22–32)
COLOR UR: ABNORMAL
CREAT SERPL-MCNC: 0.6 MG/DL (ref 0.7–1.2)
ERYTHROCYTE [DISTWIDTH] IN BLOOD BY AUTOMATED COUNT: 11.7 %
GLOBULIN,GLOB: 3.1
GLUCOSE 24H UR-MRATE: NEGATIVE G/(24.H)
GLUCOSE SERPL-MCNC: 94 MG/DL (ref 65–105)
HCT VFR BLD AUTO: 47.3 % (ref 37–51)
HDLC SERPL-MCNC: 62 MG/DL (ref 35–130)
HGB BLD-MCNC: 15.4 G/DL (ref 12–18)
HGB UR QL STRIP: NEGATIVE
KETONES UR QL STRIP.AUTO: NEGATIVE
LDL/HDL RATIO,LDHD: 2 RATIO
LDLC SERPL CALC-MCNC: 116 MG/DL (ref 0–130)
LEUKOCYTE ESTERASE: NEGATIVE
LYMPHOCYTES ABSOLUTE: 1.9 K/UL (ref 0.6–4.1)
LYMPHOCYTES NFR BLD: 31.6 % (ref 10–58.5)
MCH RBC QN AUTO: 31.7 PG (ref 26–32)
MCHC RBC AUTO-ENTMCNC: 32.6 G/DL (ref 30–36)
MCV RBC AUTO: 97.4 FL (ref 80–97)
MONOCYTES ABS-DIF,2141: 0.6 K/UL (ref 0–1.8)
MONOCYTES NFR BLD: 9.6 % (ref 0.1–24)
NEUTROPHILS # BLD: 58.8 % (ref 37–92)
NEUTROPHILS ABS,2156: 3.5 K/UL (ref 2–7.8)
NITRITE UR QL STRIP.AUTO: NEGATIVE
PH UR STRIP: 6 [PH] (ref 5–7)
PLATELET # BLD AUTO: 244 K/UL (ref 140–440)
POTASSIUM SERPL-SCNC: 4.5 MMOL/L (ref 3.6–5)
PROT SERPL-MCNC: 7.3 G/DL (ref 6.3–8.2)
PROT UR STRIP-MCNC: NEGATIVE MG/DL
RBC # BLD AUTO: 4.86 M/UL (ref 4.2–6.3)
RBC #/AREA URNS HPF: ABNORMAL #/HPF
SODIUM SERPL-SCNC: 139 MMOL/L (ref 137–145)
SP GR UR REFRACTOMETRY: 1.01 (ref 1–1.03)
SQUAMOUS EPITHELIAL CELLS: ABNORMAL
TRIGL SERPL-MCNC: 134 MG/DL (ref 0–200)
TSH SERPL DL<=0.05 MIU/L-ACNC: 0.78 UIU/ML (ref 0.34–5.6)
UROBILINOGEN UR QL STRIP.AUTO: NEGATIVE
VLDLC SERPL CALC-MCNC: 27 MG/DL
WBC # BLD AUTO: 5.9 K/UL (ref 4.1–10.9)
WBC URNS QL MICRO: ABNORMAL #/HPF

## 2020-12-10 PROCEDURE — 99396 PREV VISIT EST AGE 40-64: CPT | Performed by: INTERNAL MEDICINE

## 2020-12-10 PROCEDURE — 80053 COMPREHEN METABOLIC PANEL: CPT | Performed by: INTERNAL MEDICINE

## 2020-12-10 PROCEDURE — 85025 COMPLETE CBC W/AUTO DIFF WBC: CPT | Performed by: INTERNAL MEDICINE

## 2020-12-10 PROCEDURE — 82550 ASSAY OF CK (CPK): CPT | Performed by: INTERNAL MEDICINE

## 2020-12-10 PROCEDURE — 81001 URINALYSIS AUTO W/SCOPE: CPT | Performed by: INTERNAL MEDICINE

## 2020-12-10 PROCEDURE — 84443 ASSAY THYROID STIM HORMONE: CPT | Performed by: INTERNAL MEDICINE

## 2020-12-10 PROCEDURE — 36415 COLL VENOUS BLD VENIPUNCTURE: CPT | Performed by: INTERNAL MEDICINE

## 2020-12-10 PROCEDURE — 80061 LIPID PANEL: CPT | Performed by: INTERNAL MEDICINE

## 2020-12-10 RX ORDER — PRAVASTATIN SODIUM 20 MG/1
TABLET ORAL
Qty: 90 TAB | Refills: 2 | Status: SHIPPED | OUTPATIENT
Start: 2020-12-10 | End: 2021-08-26

## 2020-12-10 RX ORDER — ALPRAZOLAM 0.5 MG/1
TABLET ORAL
Qty: 90 TAB | Refills: 0 | Status: SHIPPED | OUTPATIENT
Start: 2020-12-10 | End: 2021-01-25

## 2020-12-10 NOTE — PROGRESS NOTES
Sofia Aggarwal is a 48 y.o. female presenting for Complete Physical  .     1. Have you been to the ER, urgent care clinic since your last visit? Hospitalized since your last visit? No    2. Have you seen or consulted any other health care providers outside of the [address] [place] [address] since your last visit? Include any pap smears or colon screening. No    No flowsheet data found. No flowsheet data found. 3 most recent PHQ Screens 5/14/2020   Little interest or pleasure in doing things Not at all   Feeling down, depressed, irritable, or hopeless Not at all   Total Score PHQ 2 0       There are no discontinued medications.

## 2020-12-10 NOTE — PROGRESS NOTES
Subjective:     48 y.o. female for annual Comprehensive personal healthcare examination. History of present illness: This patient has multiple medical problems. These include:     Chanel Cortes presents to the office today for a complete checkup. She is a 58-year-old  female whose medical issues include the following    The patient has hypercholesterolemia currently on pravastatin therapy. She is tolerating this without muscle soreness or GI upset. She has no history of coronary artery disease and denies exertional chest pains or claudication. She has severe anxiety associated with panic. This is been a longstanding problem for which she has been taking as needed Xanax. She does well with this regimen and has not abused it. She has had more anxiety recently due to the death of her mother. The patient denies any recent panic attacks. She is not had any severe depressive symptoms. Patient has fibromyalgia with periodic flare. She currently is not taking anything for this. GERD is currently being managed without medication. She denies any dysphagia, early satiety or unexplained weight loss. She has mild intermittent asthma without complication. She does have an albuterol inhaler to use on a as needed basis for this. She has recently had a chronic dry cough. She does continue to smoke. There is been no wheezing or sputum production.     Patient Active Problem List   Diagnosis Code    Acne vulgaris L70.0    Severe anxiety with panic F41.0    Mild intermittent asthma without complication X54.46    Cigarette nicotine dependence without complication M56.235    Fibromyalgia M79.7    GERD without esophagitis K21.9    Hypercholesterolemia E78.00    Menopause Z78.0    Acute pain of both knees M25.561, M25.562      Past Medical History:   Diagnosis Date    Acne vulgaris 8/10/2017    Cigarette nicotine dependence without complication 8/39/3261    Fibromyalgia 8/10/2017    GERD without esophagitis 8/10/2017    Hypercholesterolemia 8/10/2017    Lingual tonsillitis 8/10/2017    Menopause 8/10/2017    Mild intermittent asthma without complication 7/94/6542    Severe anxiety with panic 8/10/2017     Past Surgical History:   Procedure Laterality Date    IMPLANT BREAST SILICONE/EQ Bilateral 6814    subglandular     Allergies   Allergen Reactions    Pcn [Penicillins] Hives    Sulfa (Sulfonamide Antibiotics) Hives     Current Outpatient Medications   Medication Sig Dispense Refill    Lactobac no.41/Bifidobact no.7 (PROBIOTIC-10 PO) Take  by mouth daily.  pravastatin (PRAVACHOL) 20 mg tablet TAKE 1 TABLET EVERY DAY FOR CHOLESTEROL 90 Tab 2    ALPRAZolam (XANAX) 0.5 mg tablet TAKE 1 TABLET BY MOUTH THREE TIMES A DAY IF NEEDED FOR ANXIETY 90 Tab 0    estradioL (ESTRACE) 0.5 mg tablet Take 0.5 mg by mouth daily.  doxycycline (VIBRAMYCIN) 100 mg capsule T1C BID CF 60 Cap 0    PROAIR HFA 90 mcg/actuation inhaler USE 2 PUFFS EVERY FOUR HOURS IF NEEDED FOR SHORTNESS OF BREATH OR WHEEZING 8.5 g 3     Social History     Socioeconomic History    Marital status:      Spouse name: Not on file    Number of children: Not on file    Years of education: Not on file    Highest education level: Not on file   Tobacco Use    Smoking status: Current Every Day Smoker     Packs/day: 0.50     Years: 30.00     Pack years: 15.00    Smokeless tobacco: Never Used   Substance and Sexual Activity    Alcohol use:  Yes     Alcohol/week: 12.0 standard drinks     Types: 12 Cans of beer per week    Drug use: No     Family History   Problem Relation Age of Onset    Cancer Mother         skin, throat    Arthritis-osteo Mother     Anxiety Mother     No Known Problems Father        Health Maintenance   Topic Date Due    PAP AKA CERVICAL CYTOLOGY  04/05/1987    Lipid Screen  05/01/2020    Shingrix Vaccine Age 50> (1 of 2) 03/17/2021 (Originally 4/5/2016)    Flu Vaccine (1) 06/30/2021 (Originally 9/1/2020)    DTaP/Tdap/Td series (1 - Tdap) 12/10/2021 (Originally 4/5/1987)    Pneumococcal 0-64 years (1 of 1 - PPSV23) 12/10/2021 (Originally 4/5/1972)    Breast Cancer Screen Mammogram  02/13/2021    Colorectal Cancer Screening Combo  08/23/2028       Review of Systems  Constitutional:  She denies fever, weight loss, sweats or fatigue. HEENT:  No blurred or double vision, headache or dizziness. No difficulty with swallowing, taste, speech or smell. Respiratory: Positive for chronic nonproductive cough without wheezing cardiac:  Denies chest pain, palpitations, unexplained indigestion, syncope, edema, PND or orthopnea. GI:  No changes in bowel movements, no abdominal pain, no bloating, anorexia, nausea, vomiting or heartburn. :  No frequency or dysuria. Denies incontinence. Extremities:  No joint pain, stiffness or swelling. Skin:  No recent rashes or mole changes. Neurological:  No numbness, tingling, burning paresthesias or loss of motor strength. No syncope, dizziness, frequent headaches or memory loss. ROS otherwise negative      Objective:     Vitals:    12/10/20 1001   BP: 116/70   Pulse: 75   Resp: 16   Temp: 98.9 °F (37.2 °C)   TempSrc: Oral   SpO2: 98%   Weight: 133 lb (60.3 kg)   Height: 5' 4.5\" (1.638 m)   PainSc:   0 - No pain     Body mass index is 22.48 kg/m². Physical Examination:   General Appearance:  Well-developed, well-nourished, no acute distress. Vision:  Deferred to ophthalmologist.       HEENT:    Ears:  The TMs and ear canals were clear. Eyes:  The pupillary responses were normal.  Extraocular muscle function intact. Lids and conjunctiva not injected. No conjunctiva redness or drainage. Pharynx:  Clear with teeth in good repair. No masses were noted. Neck:  Supple without thyromegaly or adenopathy. No JVD noted. No carotid bruits. Lungs:  Clear to auscultation and percussion. Cardiac:  Regular rate and rhythm without murmur. PMI not displaced.   No gallop, rub or click. Abdomen:  Flat, soft, non-tender without palpable organomegaly or mass. No pulsatile mass was felt, and no bruit was heard. Bowel sounds were active. Breasts:  Deferred to GYN  GYN: Deferred to GYN    Rectal exam: Deferred to GYN    Extremities:  No clubbing, cyanosis or edema. Pulses:  Dorsalis pedis and posterior tibial pulses felt without difficulty. Skin:  No rash or unusual mole changes noted. Lymph Nodes:  None felt in the cervical, supraclavicular, axillary or inguinal region. Neurological:  Cranial nerves II-XII grossly intact. Motor strength 5/5. DTRs 2+ and symmetric. Station and gait normal.  Physical exam otherwise negative        Assessment/Plan:     Diagnoses and all orders for this visit:    Routine physical examination  -     COLLECTION VENOUS BLOOD,VENIPUNCTURE  -     CBC WITH AUTOMATED DIFF  -     CK  -     LIPID PANEL  -     METABOLIC PANEL, COMPREHENSIVE  -     TSH 3RD GENERATION  -     URINALYSIS W/MICROSCOPIC    Severe anxiety with panic  -     ALPRAZolam (XANAX) 0.5 mg tablet; TAKE 1 TABLET BY MOUTH THREE TIMES A DAY IF NEEDED FOR ANXIETY, Normal, Disp-90 Tab,R-0    Hypercholesterolemia  -     pravastatin (PRAVACHOL) 20 mg tablet; TAKE 1 TABLET EVERY DAY FOR CHOLESTEROL, Normal, Disp-90 Tab,R-2que for 11/27    Fibromyalgia    GERD without esophagitis    Mild intermittent asthma without complication  -     XR CHEST PA LAT; Future    Cigarette nicotine dependence without complication        Other instructions: The patient's medications were reviewed and reconciled. No change in her current medical regimen will be made. A prudent diet and exercise is encouraged    She is counseled to stop smoking    Health maintenance issues were reviewed and she is reminded that she is due for Pap testing.     Age-appropriate vaccinations including Pneumovax 23, influenza, Tdap and shingles vaccine series are declined    A chest x-ray is obtained appears unremarkable. Controlled substance agreement is endorsed today    Await results of multiple labs    Follow-up 6 months    Follow-up and Dispositions    · Return in about 6 months (around 6/10/2021). Alix Sinclair MD     Please note that this dictation was completed with [employer], the computer voice recognition software. Quite often unanticipated grammatical, syntax, homophones, and other interpretive errors are inadvertently transcribed by the computer software. Please disregard these errors. Please excuse any errors that have escaped final proofreading.

## 2020-12-10 NOTE — PATIENT INSTRUCTIONS
DASH Diet: Care Instructions Your Care Instructions The DASH diet is an eating plan that can help lower your blood pressure. DASH stands for Dietary Approaches to Stop Hypertension. Hypertension is high blood pressure. The DASH diet focuses on eating foods that are high in calcium, potassium, and magnesium. These nutrients can lower blood pressure. The foods that are highest in these nutrients are fruits, vegetables, low-fat dairy products, nuts, seeds, and legumes. But taking calcium, potassium, and magnesium supplements instead of eating foods that are high in those nutrients does not have the same effect. The DASH diet also includes whole grains, fish, and poultry. The DASH diet is one of several lifestyle changes your doctor may recommend to lower your high blood pressure. Your doctor may also want you to decrease the amount of sodium in your diet. Lowering sodium while following the DASH diet can lower blood pressure even further than just the DASH diet alone. Follow-up care is a key part of your treatment and safety. Be sure to make and go to all appointments, and call your doctor if you are having problems. It's also a good idea to know your test results and keep a list of the medicines you take. How can you care for yourself at home? Following the DASH diet · Eat 4 to 5 servings of fruit each day. A serving is 1 medium-sized piece of fruit, ½ cup chopped or canned fruit, 1/4 cup dried fruit, or 4 ounces (½ cup) of fruit juice. Choose fruit more often than fruit juice. · Eat 4 to 5 servings of vegetables each day. A serving is 1 cup of lettuce or raw leafy vegetables, ½ cup of chopped or cooked vegetables, or 4 ounces (½ cup) of vegetable juice. Choose vegetables more often than vegetable juice. · Get 2 to 3 servings of low-fat and fat-free dairy each day. A serving is 8 ounces of milk, 1 cup of yogurt, or 1 ½ ounces of cheese. · Eat 6 to 8 servings of grains each day. A serving is 1 slice of bread, 1 ounce of dry cereal, or ½ cup of cooked rice, pasta, or cooked cereal. Try to choose whole-grain products as much as possible. · Limit lean meat, poultry, and fish to 2 servings each day. A serving is 3 ounces, about the size of a deck of cards. · Eat 4 to 5 servings of nuts, seeds, and legumes (cooked dried beans, lentils, and split peas) each week. A serving is 1/3 cup of nuts, 2 tablespoons of seeds, or ½ cup of cooked beans or peas. · Limit fats and oils to 2 to 3 servings each day. A serving is 1 teaspoon of vegetable oil or 2 tablespoons of salad dressing. · Limit sweets and added sugars to 5 servings or less a week. A serving is 1 tablespoon jelly or jam, ½ cup sorbet, or 1 cup of lemonade. · Eat less than 2,300 milligrams (mg) of sodium a day. If you limit your sodium to 1,500 mg a day, you can lower your blood pressure even more. Tips for success · Start small. Do not try to make dramatic changes to your diet all at once. You might feel that you are missing out on your favorite foods and then be more likely to not follow the plan. Make small changes, and stick with them. Once those changes become habit, add a few more changes. · Try some of the following: ? Make it a goal to eat a fruit or vegetable at every meal and at snacks. This will make it easy to get the recommended amount of fruits and vegetables each day. ? Try yogurt topped with fruit and nuts for a snack or healthy dessert. ? Add lettuce, tomato, cucumber, and onion to sandwiches. ? Combine a ready-made pizza crust with low-fat mozzarella cheese and lots of vegetable toppings. Try using tomatoes, squash, spinach, broccoli, carrots, cauliflower, and onions. ? Have a variety of cut-up vegetables with a low-fat dip as an appetizer instead of chips and dip. ? Sprinkle sunflower seeds or chopped almonds over salads.  Or try adding chopped walnuts or almonds to cooked vegetables. ? Try some vegetarian meals using beans and peas. Add garbanzo or kidney beans to salads. Make burritos and tacos with mashed rodriguez beans or black beans. Where can you learn more? Go to http://www.gray.com/ Enter Z124 in the search box to learn more about \"DASH Diet: Care Instructions. \" Current as of: December 16, 2019               Content Version: 12.6 © 4450-0785 [employer]. Care instructions adapted under license by [employer] (which disclaims liability or warranty for this information). If you have questions about a medical condition or this instruction, always ask your healthcare professional. [address] any warranty or liability for your use of this information.

## 2020-12-12 LAB — BACTERIA UR CULT: NORMAL

## 2020-12-23 RX ORDER — ALBUTEROL SULFATE 90 UG/1
AEROSOL, METERED RESPIRATORY (INHALATION)
Qty: 18 G | Refills: 2 | Status: SHIPPED | OUTPATIENT
Start: 2020-12-23 | End: 2021-03-27

## 2021-01-25 DIAGNOSIS — F41.0 SEVERE ANXIETY WITH PANIC: Chronic | ICD-10-CM

## 2021-01-25 RX ORDER — ALPRAZOLAM 0.5 MG/1
TABLET ORAL
Qty: 90 TAB | Refills: 0 | Status: SHIPPED | OUTPATIENT
Start: 2021-01-25 | End: 2021-03-16

## 2021-02-03 ENCOUNTER — TRANSCRIBE ORDER (OUTPATIENT)
Dept: SCHEDULING | Age: 56
End: 2021-02-03

## 2021-02-03 DIAGNOSIS — Z12.31 VISIT FOR SCREENING MAMMOGRAM: Primary | ICD-10-CM

## 2021-03-16 DIAGNOSIS — F41.0 SEVERE ANXIETY WITH PANIC: Chronic | ICD-10-CM

## 2021-03-16 RX ORDER — ALPRAZOLAM 0.5 MG/1
TABLET ORAL
Qty: 90 TAB | Refills: 0 | Status: SHIPPED | OUTPATIENT
Start: 2021-03-16 | End: 2021-04-15

## 2021-03-17 ENCOUNTER — HOSPITAL ENCOUNTER (OUTPATIENT)
Dept: MAMMOGRAPHY | Age: 56
Discharge: HOME | End: 2021-03-17
Attending: INTERNAL MEDICINE
Payer: COMMERCIAL

## 2021-03-17 DIAGNOSIS — Z12.31 VISIT FOR SCREENING MAMMOGRAM: ICD-10-CM

## 2021-03-17 PROCEDURE — 77067 SCR MAMMO BI INCL CAD: CPT

## 2021-03-27 RX ORDER — ALBUTEROL SULFATE 90 UG/1
AEROSOL, METERED RESPIRATORY (INHALATION)
Qty: 18 G | Refills: 1 | Status: SHIPPED | OUTPATIENT
Start: 2021-03-27 | End: 2021-06-28

## 2021-04-15 DIAGNOSIS — F41.0 SEVERE ANXIETY WITH PANIC: Chronic | ICD-10-CM

## 2021-04-15 RX ORDER — ALPRAZOLAM 0.5 MG/1
TABLET ORAL
Qty: 90 TAB | Refills: 0 | Status: SHIPPED | OUTPATIENT
Start: 2021-04-15 | End: 2021-05-11

## 2021-05-11 DIAGNOSIS — F41.0 SEVERE ANXIETY WITH PANIC: Chronic | ICD-10-CM

## 2021-05-11 RX ORDER — DOXYCYCLINE 100 MG/1
CAPSULE ORAL
Qty: 60 CAP | Refills: 0 | Status: SHIPPED | OUTPATIENT
Start: 2021-05-11 | End: 2022-01-18

## 2021-05-11 RX ORDER — ALPRAZOLAM 0.5 MG/1
TABLET ORAL
Qty: 90 TAB | Refills: 0 | Status: SHIPPED | OUTPATIENT
Start: 2021-05-11 | End: 2021-06-23

## 2021-06-23 ENCOUNTER — OFFICE VISIT (OUTPATIENT)
Dept: INTERNAL MEDICINE CLINIC | Age: 56
End: 2021-06-23
Payer: COMMERCIAL

## 2021-06-23 VITALS
HEIGHT: 65 IN | SYSTOLIC BLOOD PRESSURE: 126 MMHG | BODY MASS INDEX: 22.59 KG/M2 | HEART RATE: 80 BPM | RESPIRATION RATE: 20 BRPM | OXYGEN SATURATION: 96 % | WEIGHT: 135.6 LBS | DIASTOLIC BLOOD PRESSURE: 80 MMHG | TEMPERATURE: 98 F

## 2021-06-23 DIAGNOSIS — F17.210 CIGARETTE NICOTINE DEPENDENCE WITHOUT COMPLICATION: Chronic | ICD-10-CM

## 2021-06-23 DIAGNOSIS — F41.0 SEVERE ANXIETY WITH PANIC: Chronic | ICD-10-CM

## 2021-06-23 DIAGNOSIS — E78.00 HYPERCHOLESTEROLEMIA: Primary | Chronic | ICD-10-CM

## 2021-06-23 DIAGNOSIS — J45.20 MILD INTERMITTENT ASTHMA WITHOUT COMPLICATION: Chronic | ICD-10-CM

## 2021-06-23 PROCEDURE — 99214 OFFICE O/P EST MOD 30 MIN: CPT | Performed by: INTERNAL MEDICINE

## 2021-06-23 RX ORDER — ALPRAZOLAM 0.5 MG/1
TABLET ORAL
Qty: 90 TABLET | Refills: 0 | Status: SHIPPED | OUTPATIENT
Start: 2021-06-23 | End: 2021-07-20

## 2021-06-23 NOTE — PROGRESS NOTES
Subjective:     Mrs. Sravani Crawford returns to the office today in follow-up of multiple medical problems. The patient has hypercholesterolemia currently on statin therapy. She tolerates this without muscle soreness or GI upset. She has no history of vascular disease and denies exertional chest pains or claudication. Last labs were done on 12/10 with total cholesterol of 205, , HDL 62. Patient has a history of mild intermittent asthma and does use albuterol on a as needed basis. She continues to smoke. She does note clearish thick sputum which she coughs up daily. She denies any pleuritic pain or shortness of breath. She has a history of severe anxiety with associated panic attacks. She does continue to use Xanax on a regular basis. This continues to work effectively in preventing her attacks and allowing her to do ADLs. She has had no long-term side effects related to the medication has been compliant with her controlled substance requirements.     Past Medical History:   Diagnosis Date    Acne vulgaris 8/10/2017    Cigarette nicotine dependence without complication 7/77/0397    Fibromyalgia 8/10/2017    GERD without esophagitis 8/10/2017    Hypercholesterolemia 8/10/2017    Lingual tonsillitis 8/10/2017    Menopause 8/10/2017    Mild intermittent asthma without complication 4/56/2215    Severe anxiety with panic 8/10/2017     Past Surgical History:   Procedure Laterality Date    IMPLANT BREAST SILICONE/EQ Bilateral 8259    subglandular     Allergies   Allergen Reactions    Pcn [Penicillins] Hives    Sulfa (Sulfonamide Antibiotics) Hives     Current Outpatient Medications   Medication Sig Dispense Refill    ALPRAZolam (XANAX) 0.5 mg tablet TAKE 1 TABLET BY MOUTH THREE TIMES A DAY IF NEEDED FOR ANXIETY 90 Tablet 0    doxycycline (VIBRAMYCIN) 100 mg capsule TAKE 1 CAPSULE TWICE A DAY WITH FOOD 60 Cap 0    albuterol (PROVENTIL HFA, VENTOLIN HFA, PROAIR HFA) 90 mcg/actuation inhaler USE 2 PUFFS EVERY FOUR HOURS IF NEEDED FOR SHORTNESS OF BREATH OR WHEEZING 18 g 1    pravastatin (PRAVACHOL) 20 mg tablet TAKE 1 TABLET EVERY DAY FOR CHOLESTEROL 90 Tab 2    estradioL (ESTRACE) 0.5 mg tablet Take 0.5 mg by mouth daily. Social History     Socioeconomic History    Marital status:      Spouse name: Not on file    Number of children: Not on file    Years of education: Not on file    Highest education level: Not on file   Tobacco Use    Smoking status: Current Every Day Smoker     Packs/day: 0.50     Years: 30.00     Pack years: 15.00    Smokeless tobacco: Never Used   Vaping Use    Vaping Use: Never used   Substance and Sexual Activity    Alcohol use: Yes     Alcohol/week: 12.0 standard drinks     Types: 12 Cans of beer per week    Drug use: No     Social Determinants of Health     Financial Resource Strain:     Difficulty of Paying Living Expenses:    Food Insecurity:     Worried About Running Out of Food in the Last Year:     [address] in the Last Year:    Transportation Needs:     Lack of Transportation (Medical):      Lack of Transportation (Non-Medical):    Physical Activity:     Days of Exercise per Week:     Minutes of Exercise per Session:    Stress:     Feeling of Stress :    Social Connections:     Frequency of Communication with Friends and Family:     Frequency of Social Gatherings with Friends and Family:     Attends [employer] Services:     Active Member of Clubs or Organizations:     Attends Club or Organization Meetings:     Marital Status:      Family History   Problem Relation Age of Onset    Cancer Mother         skin, throat    Arthritis-osteo Mother     Anxiety Mother     No Known Problems Father        Review of Systems:  GEN: no weight loss, weight gain, fatigue or night sweats  CV: no PND, orthopnea, or palpitations  Resp: no dyspnea on exertion, no cough  Abd: no nausea, vomiting or diarrhea  EXT: denies edema, claudication  Endocrine: no hair loss, excessive thirst or polyuria  Neurological ROS: no TIA or stroke symptoms  ROS otherwise negative      Objective:     Visit Vitals  /80 (BP 1 Location: Right upper arm, BP Patient Position: Sitting, BP Cuff Size: Adult)   Pulse 80   Temp 98 °F (36.7 °C) (Oral)   Resp 20   Ht 5' 4.5\" (1.638 m)   Wt 135 lb 9.6 oz (61.5 kg)   SpO2 96%   BMI 22.92 kg/m²     Body mass index is 22.92 kg/m². General:   alert, cooperative and no distress   Eyes: conjunctivae/sclerae clear. PERRL, EOM's intact   Mouth:  No oral lesions, no pharyngeal erythema, no exudates   Neck: Trachea midline, no thyromegaly, no bruits   Heart: S1 and S2 normal,no murmurs noted    Lungs: Clear to auscultation bilaterally, no increased work of breathing   Abdomen: Soft, nontender. Normal bowel sounds   Extremities: No edema or cyanosis   Neuro: ..alert, oriented x3,speech normal in context and clarity, cranial nerves II-XII intact,motor strength: full proximally and distally,gait: normal  reflexes: full and symmetric     Physical exam otherwise negative         Assessment/Plan:     Diagnoses and all orders for this visit:    Hypercholesterolemia    Mild intermittent asthma without complication    Cigarette nicotine dependence without complication    Severe anxiety with panic        Other instructions: The patient's medications were reviewed and reconciled. No change in her current medical regimen will be made. Continue Xanax as it allows her to do ADLs and to perform effectively. Medication continues to work predictably in controlling symptoms when they are exacerbated. Patient counseled to stop smoking    Covid19 vaccination has been strongly encouraged    PDMP is queried today and appears appropriate    Labs from 12/10 were reviewed with the patient today    Follow-up in 6 months    Follow-up and Dispositions    · Return in about 6 months (around 12/23/2021).          Vinicio Porras. Nikki Lopez, MD    Please note that this dictation was completed with [employer], the computer voice recognition software. Quite often unanticipated grammatical, syntax, homophones, and other interpretive errors are inadvertently transcribed by the computer software. Please disregard these errors. Please excuse any errors that have escaped final proofreading.

## 2021-06-23 NOTE — PROGRESS NOTES
Arron Perez is a 55 y.o. female presenting for Follow Up Chronic Condition (6 mo fu)  . 1. Have you been to the ER, urgent care clinic since your last visit? Hospitalized since your last visit? No    2. Have you seen or consulted any other health care providers outside of the [address] [place] [address] since your last visit? Include any pap smears or colon screening. No    No flowsheet data found. No flowsheet data found. 3 most recent PHQ Screens 5/14/2020   Little interest or pleasure in doing things Not at all   Feeling down, depressed, irritable, or hopeless Not at all   Total Score PHQ 2 0       There are no discontinued medications.

## 2021-06-28 RX ORDER — ALBUTEROL SULFATE 90 UG/1
AEROSOL, METERED RESPIRATORY (INHALATION)
Qty: 18 G | Refills: 0 | Status: SHIPPED | OUTPATIENT
Start: 2021-06-28 | End: 2022-05-26

## 2021-06-30 ENCOUNTER — TELEPHONE (OUTPATIENT)
Dept: INTERNAL MEDICINE CLINIC | Age: 56
End: 2021-06-30

## 2021-06-30 RX ORDER — AZITHROMYCIN 250 MG/1
250 TABLET, FILM COATED ORAL SEE ADMIN INSTRUCTIONS
Qty: 6 TABLET | Refills: 0 | Status: SHIPPED | OUTPATIENT
Start: 2021-06-30 | End: 2021-07-05

## 2021-06-30 NOTE — TELEPHONE ENCOUNTER
Patient was seen last Wednesday. She states she mentioned her sinus issues. She is having a lot of drainage. She would like to know if you can call in a z pack to [address] [place] [address] Drug.      [phone]

## 2021-06-30 NOTE — TELEPHONE ENCOUNTER
Please send pended Rx to [facility]:  Requested Prescriptions     Pending Prescriptions Disp Refills    azithromycin (ZITHROMAX) 250 mg tablet 6 Tablet 0     Sig: Take 1 Tablet by mouth See Admin Instructions for 5 days.

## 2021-07-20 DIAGNOSIS — F41.0 SEVERE ANXIETY WITH PANIC: Chronic | ICD-10-CM

## 2021-07-20 RX ORDER — ALPRAZOLAM 0.5 MG/1
TABLET ORAL
Qty: 90 TABLET | Refills: 0 | Status: SHIPPED | OUTPATIENT
Start: 2021-07-20 | End: 2021-09-10

## 2021-07-27 ENCOUNTER — TELEPHONE (OUTPATIENT)
Dept: INTERNAL MEDICINE CLINIC | Age: 56
End: 2021-07-27

## 2021-07-27 NOTE — TELEPHONE ENCOUNTER
Patient states she stepped in a hole abut 2 1/2 weeks ago and hurt her right foot. It is stiff and painful in the am.She bought an ankle brace and it is not helping.  Should she be seen here or go to a specialist?     [phone]

## 2021-08-24 ENCOUNTER — TELEPHONE (OUTPATIENT)
Dept: INTERNAL MEDICINE CLINIC | Age: 56
End: 2021-08-24

## 2021-08-24 NOTE — TELEPHONE ENCOUNTER
Patient states she has had a cough for 3 months. You prescribed a z federico for a sinus infection on 6/30 but her cough is not any better She is scheduled to have her first covid  Vaccine on Friday. Is it okay to get the vaccine or does she need another medication to get rid of the cough before the shot? Please advise.      [phone]

## 2021-08-24 NOTE — TELEPHONE ENCOUNTER
It would be okay to get the vaccine. Cough likely related to asthma/COPD. Recommend using Delsym to help control cough.

## 2021-08-26 DIAGNOSIS — E78.00 HYPERCHOLESTEROLEMIA: Chronic | ICD-10-CM

## 2021-08-26 RX ORDER — PRAVASTATIN SODIUM 20 MG/1
TABLET ORAL
Qty: 90 TABLET | Refills: 1 | Status: SHIPPED | OUTPATIENT
Start: 2021-08-26 | End: 2022-03-14

## 2021-09-09 DIAGNOSIS — F41.0 SEVERE ANXIETY WITH PANIC: Chronic | ICD-10-CM

## 2021-09-10 RX ORDER — ALPRAZOLAM 0.5 MG/1
TABLET ORAL
Qty: 90 TABLET | Refills: 0 | Status: SHIPPED | OUTPATIENT
Start: 2021-09-10 | End: 2021-10-27

## 2021-09-23 ENCOUNTER — TELEPHONE (OUTPATIENT)
Dept: INTERNAL MEDICINE CLINIC | Age: 56
End: 2021-09-23

## 2021-09-23 DIAGNOSIS — Z12.11 COLON CANCER SCREENING: Primary | ICD-10-CM

## 2021-09-23 NOTE — TELEPHONE ENCOUNTER
Patient states she received a letter in the mail from [facility] and it states she is due for another test. Please order and call patient to let her know.     [phone]

## 2021-10-26 DIAGNOSIS — F41.0 SEVERE ANXIETY WITH PANIC: Chronic | ICD-10-CM

## 2021-10-27 LAB — COLOGUARD TEST, EXTERNAL: NEGATIVE

## 2021-10-27 RX ORDER — ALPRAZOLAM 0.5 MG/1
TABLET ORAL
Qty: 90 TABLET | Refills: 0 | Status: SHIPPED | OUTPATIENT
Start: 2021-10-27 | End: 2021-11-26

## 2021-11-26 DIAGNOSIS — F41.0 SEVERE ANXIETY WITH PANIC: Chronic | ICD-10-CM

## 2021-11-26 RX ORDER — ALPRAZOLAM 0.5 MG/1
TABLET ORAL
Qty: 90 TABLET | Refills: 0 | Status: SHIPPED | OUTPATIENT
Start: 2021-11-26 | End: 2021-12-29 | Stop reason: SDUPTHER

## 2021-12-29 ENCOUNTER — OFFICE VISIT (OUTPATIENT)
Dept: INTERNAL MEDICINE CLINIC | Age: 56
End: 2021-12-29
Payer: COMMERCIAL

## 2021-12-29 VITALS
OXYGEN SATURATION: 97 % | TEMPERATURE: 98.4 F | DIASTOLIC BLOOD PRESSURE: 79 MMHG | BODY MASS INDEX: 23.13 KG/M2 | RESPIRATION RATE: 18 BRPM | HEART RATE: 80 BPM | SYSTOLIC BLOOD PRESSURE: 117 MMHG | WEIGHT: 138.8 LBS | HEIGHT: 65 IN

## 2021-12-29 DIAGNOSIS — E78.00 HYPERCHOLESTEROLEMIA: Primary | Chronic | ICD-10-CM

## 2021-12-29 DIAGNOSIS — L70.0 ACNE VULGARIS: ICD-10-CM

## 2021-12-29 DIAGNOSIS — J45.20 MILD INTERMITTENT ASTHMA WITHOUT COMPLICATION: Chronic | ICD-10-CM

## 2021-12-29 DIAGNOSIS — J01.00 ACUTE MAXILLARY SINUSITIS, RECURRENCE NOT SPECIFIED: ICD-10-CM

## 2021-12-29 DIAGNOSIS — Z11.59 NEED FOR HEPATITIS C SCREENING TEST: ICD-10-CM

## 2021-12-29 DIAGNOSIS — F17.210 CIGARETTE NICOTINE DEPENDENCE WITHOUT COMPLICATION: Chronic | ICD-10-CM

## 2021-12-29 DIAGNOSIS — F41.0 SEVERE ANXIETY WITH PANIC: Chronic | ICD-10-CM

## 2021-12-29 PROCEDURE — 99214 OFFICE O/P EST MOD 30 MIN: CPT | Performed by: INTERNAL MEDICINE

## 2021-12-29 RX ORDER — ALPRAZOLAM 0.5 MG/1
0.5 TABLET ORAL
Qty: 90 TABLET | Refills: 0 | Status: SHIPPED | OUTPATIENT
Start: 2021-12-29 | End: 2022-02-16 | Stop reason: SDUPTHER

## 2021-12-29 RX ORDER — AZITHROMYCIN 250 MG/1
250 TABLET, FILM COATED ORAL SEE ADMIN INSTRUCTIONS
Qty: 6 TABLET | Refills: 0 | Status: SHIPPED | OUTPATIENT
Start: 2021-12-29

## 2021-12-29 NOTE — PATIENT INSTRUCTIONS
DASH Diet: Care Instructions  Your Care Instructions     The DASH diet is an eating plan that can help lower your blood pressure. DASH stands for Dietary Approaches to Stop Hypertension. Hypertension is high blood pressure. The DASH diet focuses on eating foods that are high in calcium, potassium, and magnesium. These nutrients can lower blood pressure. The foods that are highest in these nutrients are fruits, vegetables, low-fat dairy products, nuts, seeds, and legumes. But taking calcium, potassium, and magnesium supplements instead of eating foods that are high in those nutrients does not have the same effect. The DASH diet also includes whole grains, fish, and poultry. The DASH diet is one of several lifestyle changes your doctor may recommend to lower your high blood pressure. Your doctor may also want you to decrease the amount of sodium in your diet. Lowering sodium while following the DASH diet can lower blood pressure even further than just the DASH diet alone. Follow-up care is a key part of your treatment and safety. Be sure to make and go to all appointments, and call your doctor if you are having problems. It's also a good idea to know your test results and keep a list of the medicines you take. How can you care for yourself at home? Following the DASH diet  · Eat 4 to 5 servings of fruit each day. A serving is 1 medium-sized piece of fruit, ½ cup chopped or canned fruit, 1/4 cup dried fruit, or 4 ounces (½ cup) of fruit juice. Choose fruit more often than fruit juice. · Eat 4 to 5 servings of vegetables each day. A serving is 1 cup of lettuce or raw leafy vegetables, ½ cup of chopped or cooked vegetables, or 4 ounces (½ cup) of vegetable juice. Choose vegetables more often than vegetable juice. · Get 2 to 3 servings of low-fat and fat-free dairy each day. A serving is 8 ounces of milk, 1 cup of yogurt, or 1 ½ ounces of cheese. · Eat 6 to 8 servings of grains each day.  A serving is 1 slice of bread, 1 ounce of dry cereal, or ½ cup of cooked rice, pasta, or cooked cereal. Try to choose whole-grain products as much as possible. · Limit lean meat, poultry, and fish to 2 servings each day. A serving is 3 ounces, about the size of a deck of cards. · Eat 4 to 5 servings of nuts, seeds, and legumes (cooked dried beans, lentils, and split peas) each week. A serving is 1/3 cup of nuts, 2 tablespoons of seeds, or ½ cup of cooked beans or peas. · Limit fats and oils to 2 to 3 servings each day. A serving is 1 teaspoon of vegetable oil or 2 tablespoons of salad dressing. · Limit sweets and added sugars to 5 servings or less a week. A serving is 1 tablespoon jelly or jam, ½ cup sorbet, or 1 cup of lemonade. · Eat less than 2,300 milligrams (mg) of sodium a day. If you limit your sodium to 1,500 mg a day, you can lower your blood pressure even more. · Be aware that all of these are the suggested number of servings for people who eat 1,800 to 2,000 calories a day. Your recommended number of servings may be different if you need more or fewer calories. Tips for success  · Start small. Do not try to make dramatic changes to your diet all at once. You might feel that you are missing out on your favorite foods and then be more likely to not follow the plan. Make small changes, and stick with them. Once those changes become habit, add a few more changes. · Try some of the following:  ? Make it a goal to eat a fruit or vegetable at every meal and at snacks. This will make it easy to get the recommended amount of fruits and vegetables each day. ? Try yogurt topped with fruit and nuts for a snack or healthy dessert. ? Add lettuce, tomato, cucumber, and onion to sandwiches. ? Combine a ready-made pizza crust with low-fat mozzarella cheese and lots of vegetable toppings. Try using tomatoes, squash, spinach, broccoli, carrots, cauliflower, and onions. ?  Have a variety of cut-up vegetables with a low-fat dip as an appetizer instead of chips and dip. ? Sprinkle sunflower seeds or chopped almonds over salads. Or try adding chopped walnuts or almonds to cooked vegetables. ? Try some vegetarian meals using beans and peas. Add garbanzo or kidney beans to salads. Make burritos and tacos with mashed rodriguez beans or black beans. Where can you learn more? Go to http://www.hargrove.com/  Enter H967 in the search box to learn more about \"DASH Diet: Care Instructions. \"  Current as of: April 29, 2021               Content Version: 13.0  © 7736-7421 [employer]. Care instructions adapted under license by [employer] (which disclaims liability or warranty for this information). If you have questions about a medical condition or this instruction, always ask your healthcare professional. [address] any warranty or liability for your use of this information.

## 2021-12-29 NOTE — PROGRESS NOTES
Subjective:     Mrs. Lexii Fernandez returns to the office today in follow-up of multiple problems. The patient has a history of mild intermittent asthma for which he uses an albuterol inhaler on a as needed basis. She also continues to smoke. She has had problems with recurrent sinusitis and bronchitis in the past.  She has noted some purulent postnasal drainage recently. She denies any cough, headache, fever or sore throat. She denies shortness of breath. She has a history of severe anxiety associated with panic which is been controlled on Xanax. This works effectively for her and keeps her calm and able to do ADLs. She has had no tolerance to the medication over time or impairment with its usage. Patient has hypercholesterolemia currently on statin therapy. She tolerates this without muscle soreness or GI upset. She has no history of vascular disease and denies exertional chest pains or claudication. Doxycycline is being used to control her acne vulgaris. She has had no GI upset related to its usage. Past Medical History:   Diagnosis Date    Acne vulgaris 8/10/2017    Cigarette nicotine dependence without complication 0/42/5134    Fibromyalgia 8/10/2017    GERD without esophagitis 8/10/2017    Hypercholesterolemia 8/10/2017    Lingual tonsillitis 8/10/2017    Menopause 8/10/2017    Mild intermittent asthma without complication 1/82/7534    Severe anxiety with panic 8/10/2017     Past Surgical History:   Procedure Laterality Date    IMPLANT BREAST SILICONE/EQ Bilateral 1839    subglandular     Allergies   Allergen Reactions    Pcn [Penicillins] Hives    Sulfa (Sulfonamide Antibiotics) Hives     Current Outpatient Medications   Medication Sig Dispense Refill    azithromycin (ZITHROMAX) 250 mg tablet Take 1 Tablet by mouth See Admin Instructions. 6 Tablet 0    ALPRAZolam (XANAX) 0.5 mg tablet Take 1 Tablet by mouth three (3) times daily as needed for Anxiety.  Max Daily Amount: 1.5 mg. 90 Tablet 0    pravastatin (PRAVACHOL) 20 mg tablet TAKE 1 TABLET EVERY DAY FOR CHOLESTEROL 90 Tablet 1    albuterol (PROVENTIL HFA, VENTOLIN HFA, PROAIR HFA) 90 mcg/actuation inhaler USE 2 PUFFS EVERY FOUR HOURS IF NEEDED FOR SHORTNESS OF BREATH OR WHEEZING 18 g 0    doxycycline (VIBRAMYCIN) 100 mg capsule TAKE 1 CAPSULE TWICE A DAY WITH FOOD 60 Cap 0     Social History     Socioeconomic History    Marital status:    Tobacco Use    Smoking status: Current Every Day Smoker     Packs/day: 0.50     Years: 30.00     Pack years: 15.00    Smokeless tobacco: Never Used   Vaping Use    Vaping Use: Never used   Substance and Sexual Activity    Alcohol use: Yes     Alcohol/week: 12.0 standard drinks     Types: 12 Cans of beer per week    Drug use: No     Family History   Problem Relation Age of Onset    Cancer Mother         skin, throat    OSTEOARTHRITIS Mother     Anxiety Mother     No Known Problems Father        Review of Systems:  GEN: no weight loss, weight gain, fatigue or night sweats  ENT: Positive for purulent postnasal drainage  CV: no PND, orthopnea, or palpitations  Resp: no dyspnea on exertion, no cough  Abd: no nausea, vomiting or diarrhea  EXT: denies edema, claudication  Endocrine: no hair loss, excessive thirst or polyuria  Neurological ROS: no TIA or stroke symptoms  ROS otherwise negative      Objective:     Visit Vitals  /79 (BP 1 Location: Left upper arm, BP Patient Position: Sitting, BP Cuff Size: Large adult)   Pulse 80   Temp 98.4 °F (36.9 °C) (Oral)   Resp 18   Ht 5' 4.5\" (1.638 m)   Wt 138 lb 12.8 oz (63 kg)   SpO2 97%   BMI 23.46 kg/m²     Body mass index is 23.46 kg/m². General:   alert, cooperative and no distress   Eyes: conjunctivae/sclerae clear.  PERRL, EOM's intact   Mouth:  No oral lesions, no pharyngeal erythema, no exudates   Neck: Trachea midline, no thyromegaly, no bruits   Heart: S1 and S2 normal,no murmurs noted    Lungs: Clear to auscultation bilaterally, no increased work of breathing   Abdomen: Soft, nontender. Normal bowel sounds   Extremities: No edema or cyanosis   Neuro: ..alert, oriented x3,speech normal in context and clarity, cranial nerves II-XII intact,motor strength: full proximally and distally,gait: normal  reflexes: full and symmetric     Physical exam otherwise negative         Assessment/Plan:     Diagnoses and all orders for this visit:    Hypercholesterolemia  -     COLLECTION VENOUS BLOOD,VENIPUNCTURE  -     CBC WITH AUTOMATED DIFF; Future  -     CK; Future  -     LIPID PANEL; Future  -     METABOLIC PANEL, COMPREHENSIVE; Future  -     TSH 3RD GENERATION; Future    Mild intermittent asthma without complication    Cigarette nicotine dependence without complication    Severe anxiety with panic  -     ALPRAZolam (XANAX) 0.5 mg tablet; Take 1 Tablet by mouth three (3) times daily as needed for Anxiety. Max Daily Amount: 1.5 mg., Normal, Disp-90 Tablet, R-0    Acne vulgaris    Need for hepatitis C screening test  -     HEPATITIS C AB; Future    Acute maxillary sinusitis, recurrence not specified  -     azithromycin (ZITHROMAX) 250 mg tablet; Take 1 Tablet by mouth See Admin Instructions. , Normal, Disp-6 Tablet, R-0        Other instructions: The patient's medications were reviewed and reconciled. No change in her current medical regimen will be made. A no added salt, prudent diet is encouraged along with exercise    Patient is up-to-date on Robert-19 vaccinations however is not due for booster shot yet. All other vaccinations are declined. CDC recommended hepatitis C testing will be obtained today. Z-William given for purulent rhinitis/sinusitis. PDMP is reviewed and appears appropriate    Await results of multiple labs    Follow-up in 6 months        Aranza Ross MD    Please note that this dictation was completed with [employer] the [employer] voice recognition software.   Quite often unanticipated grammatical, syntax, homophones, and other interpretive errors are inadvertently transcribed by the computer software. Please disregard these errors. Please excuse any errors that have escaped final proofreading.

## 2021-12-29 NOTE — PROGRESS NOTES
HIPAA verified by two patient identifiers. Julieth Stearns is a 55 y.o. female    Chief Complaint   Patient presents with    Cholesterol Problem       Visit Vitals  /79 (BP 1 Location: Left upper arm, BP Patient Position: Sitting, BP Cuff Size: Large adult)   Pulse 80   Temp 98.4 °F (36.9 °C) (Oral)   Resp 18   Ht 5' 4.5\" (1.638 m)   Wt 138 lb 12.8 oz (63 kg)   SpO2 97%   BMI 23.46 kg/m²       Pain Scale: 0 - No pain/10  Pain Location:       Health Maintenance Due   Topic Date Due    Hepatitis C Screening  Never done    Pneumococcal 0-64 years (1 of 2 - PPSV23) Never done    DTaP/Tdap/Td series (1 - Tdap) Never done    Cervical cancer screen  Never done    Shingrix Vaccine Age 50> (1 of 2) Never done    Flu Vaccine (1) Never done    COVID-19 Vaccine (2 - Pfizer 3-dose series) 10/14/2021    Lipid Screen  12/10/2021         Coordination of Care Questionnaire:  :   1) Have you been to an emergency room, urgent care, or hospitalized since your last visit? If yes, where when, and reason for visit? no       2. Have seen or consulted any other health care provider since your last visit? If yes, where when, and reason for visit? NO      Patient is accompanied by self I have received verbal consent from Julieth Stearns to discuss any/all medical information while they are present in the room.

## 2021-12-30 LAB
ALBUMIN SERPL-MCNC: 3.9 G/DL (ref 3.5–5)
ALBUMIN/GLOB SERPL: 1.1 {RATIO} (ref 1.1–2.2)
ALP SERPL-CCNC: 72 U/L (ref 45–117)
ALT SERPL-CCNC: 38 U/L (ref 12–78)
ANION GAP SERPL CALC-SCNC: 5 MMOL/L (ref 5–15)
AST SERPL-CCNC: 31 U/L (ref 15–37)
BASOPHILS # BLD: 0.1 K/UL (ref 0–0.1)
BASOPHILS NFR BLD: 1 % (ref 0–1)
BILIRUB SERPL-MCNC: 0.4 MG/DL (ref 0.2–1)
BUN SERPL-MCNC: 12 MG/DL (ref 6–20)
BUN/CREAT SERPL: 18 (ref 12–20)
CALCIUM SERPL-MCNC: 9.3 MG/DL (ref 8.5–10.1)
CHLORIDE SERPL-SCNC: 109 MMOL/L (ref 97–108)
CHOLEST SERPL-MCNC: 213 MG/DL
CK SERPL-CCNC: 131 U/L (ref 26–192)
CO2 SERPL-SCNC: 25 MMOL/L (ref 21–32)
CREAT SERPL-MCNC: 0.68 MG/DL (ref 0.55–1.02)
DIFFERENTIAL METHOD BLD: NORMAL
EOSINOPHIL # BLD: 0.2 K/UL (ref 0–0.4)
EOSINOPHIL NFR BLD: 3 % (ref 0–7)
ERYTHROCYTE [DISTWIDTH] IN BLOOD BY AUTOMATED COUNT: 12.6 % (ref 11.5–14.5)
GLOBULIN SER CALC-MCNC: 3.5 G/DL (ref 2–4)
GLUCOSE SERPL-MCNC: 95 MG/DL (ref 65–100)
HCT VFR BLD AUTO: 43.6 % (ref 35–47)
HCV AB SERPL QL IA: NONREACTIVE
HDLC SERPL-MCNC: 52 MG/DL
HDLC SERPL: 4.1 {RATIO} (ref 0–5)
HGB BLD-MCNC: 14.2 G/DL (ref 11.5–16)
IMM GRANULOCYTES # BLD AUTO: 0 K/UL (ref 0–0.04)
IMM GRANULOCYTES NFR BLD AUTO: 0 % (ref 0–0.5)
LDLC SERPL CALC-MCNC: 105 MG/DL (ref 0–100)
LYMPHOCYTES # BLD: 2.3 K/UL (ref 0.8–3.5)
LYMPHOCYTES NFR BLD: 34 % (ref 12–49)
MCH RBC QN AUTO: 31.6 PG (ref 26–34)
MCHC RBC AUTO-ENTMCNC: 32.6 G/DL (ref 30–36.5)
MCV RBC AUTO: 96.9 FL (ref 80–99)
MONOCYTES # BLD: 0.8 K/UL (ref 0–1)
MONOCYTES NFR BLD: 11 % (ref 5–13)
NEUTS SEG # BLD: 3.4 K/UL (ref 1.8–8)
NEUTS SEG NFR BLD: 51 % (ref 32–75)
NRBC # BLD: 0 K/UL (ref 0–0.01)
NRBC BLD-RTO: 0 PER 100 WBC
PLATELET # BLD AUTO: 223 K/UL (ref 150–400)
PMV BLD AUTO: 11.5 FL (ref 8.9–12.9)
POTASSIUM SERPL-SCNC: 4.2 MMOL/L (ref 3.5–5.1)
PROT SERPL-MCNC: 7.4 G/DL (ref 6.4–8.2)
RBC # BLD AUTO: 4.5 M/UL (ref 3.8–5.2)
SODIUM SERPL-SCNC: 139 MMOL/L (ref 136–145)
TRIGL SERPL-MCNC: 280 MG/DL (ref ?–150)
TSH SERPL DL<=0.05 MIU/L-ACNC: 1.27 UIU/ML (ref 0.36–3.74)
VLDLC SERPL CALC-MCNC: 56 MG/DL
WBC # BLD AUTO: 6.7 K/UL (ref 3.6–11)

## 2022-01-18 RX ORDER — DOXYCYCLINE 100 MG/1
CAPSULE ORAL
Qty: 60 CAPSULE | Refills: 0 | Status: SHIPPED | OUTPATIENT
Start: 2022-01-18

## 2022-02-16 DIAGNOSIS — F41.0 SEVERE ANXIETY WITH PANIC: Chronic | ICD-10-CM

## 2022-02-16 RX ORDER — ALPRAZOLAM 0.5 MG/1
0.5 TABLET ORAL
Qty: 90 TABLET | Refills: 0 | Status: SHIPPED | OUTPATIENT
Start: 2022-02-16 | End: 2022-03-28

## 2022-02-16 NOTE — TELEPHONE ENCOUNTER
Last Refill: 12-29-21  Last Visit: 12/29/2021   Next Visit: 7/6/2022     Requested Prescriptions     Pending Prescriptions Disp Refills    ALPRAZolam (XANAX) 0.5 mg tablet 90 Tablet 0     Sig: Take 1 Tablet by mouth three (3) times daily as needed for Anxiety.  Max Daily Amount: 1.5 mg.

## 2022-03-07 ENCOUNTER — TELEPHONE (OUTPATIENT)
Dept: INTERNAL MEDICINE CLINIC | Age: 57
End: 2022-03-07

## 2022-03-07 NOTE — TELEPHONE ENCOUNTER
Patient left a message on Friday asking how to get a sleep apnea machine. Please advise.      [phone]

## 2022-03-07 NOTE — TELEPHONE ENCOUNTER
Spoke with patient on 3/7/2022 at 4:30  patients information was verified  I stated to patient she could try the sleep center  She stated \" she is not doing this and would like a second option .

## 2022-03-08 NOTE — TELEPHONE ENCOUNTER
I do not prescribe them - we send patients to sleep lab for evaluation and treatment.  She can possible call a medical supplier for this and pay cash for it but I'm not sure they will sell it without prescription

## 2022-03-13 DIAGNOSIS — E78.00 HYPERCHOLESTEROLEMIA: Chronic | ICD-10-CM

## 2022-03-14 RX ORDER — PRAVASTATIN SODIUM 20 MG/1
TABLET ORAL
Qty: 90 TABLET | Refills: 0 | Status: SHIPPED | OUTPATIENT
Start: 2022-03-14 | End: 2022-06-03

## 2022-03-18 PROBLEM — F41.0 SEVERE ANXIETY WITH PANIC: Status: ACTIVE | Noted: 2017-08-10

## 2022-03-18 PROBLEM — Z78.0 MENOPAUSE: Status: ACTIVE | Noted: 2017-08-10

## 2022-03-18 PROBLEM — F17.210 CIGARETTE NICOTINE DEPENDENCE WITHOUT COMPLICATION: Status: ACTIVE | Noted: 2017-08-10

## 2022-03-18 PROBLEM — J45.20 MILD INTERMITTENT ASTHMA WITHOUT COMPLICATION: Status: ACTIVE | Noted: 2017-08-10

## 2022-03-19 PROBLEM — M79.7 FIBROMYALGIA: Status: ACTIVE | Noted: 2017-08-10

## 2022-03-19 PROBLEM — M25.561 ACUTE PAIN OF BOTH KNEES: Status: ACTIVE | Noted: 2019-11-06

## 2022-03-19 PROBLEM — E78.00 HYPERCHOLESTEROLEMIA: Status: ACTIVE | Noted: 2017-08-10

## 2022-03-19 PROBLEM — K21.9 GERD WITHOUT ESOPHAGITIS: Status: ACTIVE | Noted: 2017-08-10

## 2022-03-19 PROBLEM — L70.0 ACNE VULGARIS: Status: ACTIVE | Noted: 2017-08-10

## 2022-03-19 PROBLEM — M25.562 ACUTE PAIN OF BOTH KNEES: Status: ACTIVE | Noted: 2019-11-06

## 2022-03-27 DIAGNOSIS — F41.0 SEVERE ANXIETY WITH PANIC: Chronic | ICD-10-CM

## 2022-03-28 RX ORDER — ALPRAZOLAM 0.5 MG/1
TABLET ORAL
Qty: 90 TABLET | Refills: 0 | Status: SHIPPED | OUTPATIENT
Start: 2022-03-28 | End: 2022-05-25

## 2022-04-06 ENCOUNTER — TRANSCRIBE ORDER (OUTPATIENT)
Dept: MAMMOGRAPHY | Age: 57
End: 2022-04-06

## 2022-04-06 DIAGNOSIS — Z12.31 VISIT FOR SCREENING MAMMOGRAM: Primary | ICD-10-CM

## 2022-05-11 ENCOUNTER — HOSPITAL ENCOUNTER (OUTPATIENT)
Dept: MAMMOGRAPHY | Age: 57
Discharge: HOME | End: 2022-05-11
Payer: COMMERCIAL

## 2022-05-11 DIAGNOSIS — Z12.31 VISIT FOR SCREENING MAMMOGRAM: ICD-10-CM

## 2022-05-11 PROCEDURE — 77067 SCR MAMMO BI INCL CAD: CPT

## 2022-05-26 DIAGNOSIS — F41.0 SEVERE ANXIETY WITH PANIC: Chronic | ICD-10-CM

## 2022-05-26 RX ORDER — ALBUTEROL SULFATE 90 UG/1
AEROSOL, METERED RESPIRATORY (INHALATION)
Qty: 18 G | Refills: 0 | Status: SHIPPED | OUTPATIENT
Start: 2022-05-26

## 2022-05-26 RX ORDER — ALPRAZOLAM 0.5 MG/1
0.5 TABLET ORAL
Qty: 60 TABLET | Refills: 0 | Status: SHIPPED | OUTPATIENT
Start: 2022-05-26 | End: 2022-06-16

## 2022-06-23 DIAGNOSIS — F41.0 SEVERE ANXIETY WITH PANIC: Chronic | ICD-10-CM

## 2022-06-27 RX ORDER — ALPRAZOLAM 0.5 MG/1
TABLET ORAL
Qty: 60 TABLET | Refills: 0 | Status: SHIPPED | OUTPATIENT
Start: 2022-06-27 | End: 2022-08-09

## 2022-07-05 NOTE — PROGRESS NOTES
Subjective:     Chief Complaint   Patient presents with   [facility].       Estefani Bojorquez is a 65 y.o. F. I reviewed the medical record. Her last visit was 12/21. She has a history of asthma and anxiety and is an active smoker. She was felt to be doing well at her last appointment. Xanax was being used on a regular basis to treat her anxiety. Labs were unremarkable and no changes were made to her medication regimen. Today, the patient comes in for routine preventive care as well as establishment and follow-up on her chronic medical concerns. Over the past several months has been no significant clinical change. She continues on her usual medication regimen without changes. However, she says that she has been having a worsened cough with production over the past 2 months. This had apparently started when she had slipped in the bathtub, with subsequent contusion on her left sided rib cage. While this improved, and whereas CXR performed by [place] did not show rib fracture, she has continued to note coughing with production of white to yellow sputum on a daily basis. She continues to smoke 1/2 a pack of cigarettes daily as she has over the past 3-4 decades. She notes that during this time she has had to resort to her BRIAN on a nightly basis. She denies F/C, wheezing, SOB, or change in exercise tolerance. No CP or orthopnea. She had undergone PFTs many years ago but does not recall the results. She is not on any other medication for asthma other than albuterol. She notes allergies with nasal congestion but does not know if she is having post nasal drip. No hemoptysis, weight changes, or other constitutional symptoms. Other than this, she continues to have daily anxiety and panic attacks. She has been on Xanax for years and reports that she has been on other trials of medications for this, including Lexapro, Zoloft, and Paxil.   She says that exposure to these medications had caused significant fevers, hives, and other vague symptoms. She has not been on duloxetine in the past.  She continues to have symptoms of fibromyalgia with diffuse aches and pains and a sensation of water retention. She has been trying to use exercise for this and has found it to be somewhat helpful. She denies any suicidal or homicidal ideation but relates to daily anxiety. She continues on pravastatin for hypercholesterolemia, which she reports tolerating well, without any aches, pains, or GI symptoms. Other than this, her review of systems is negative. Routine Healthcare Maintenance issues are reviewed and discussed with the patient as noted below. Orders to update gaps in healthcare maintenance were placed as noted below in the Assessment and Plan, where applicable. She is up-to-date with regard to routine screening examinations. PCV 20 will be due in December. 10-year Cardiovascular Risk Denilson Duck, 2013):   The 10-year ASCVD risk score (Steven Ng et al., 2013) is: 4.4%    Values used to calculate the score:      Age: 65 years      Sex: Female      Is Non- : No      Diabetic: No      Tobacco smoker: Yes      Systolic Blood Pressure: 902 mmHg      Is BP treated: No      HDL Cholesterol: 52 MG/DL      Total Cholesterol: 213 MG/DL       Past Medical History:  Past Medical History:   Diagnosis Date    Acne vulgaris 8/10/2017    Cigarette nicotine dependence without complication 7/35/7468    Fibromyalgia 8/10/2017    GERD without esophagitis 8/10/2017    History of tonsillitis 08/10/2017    Lingual tonsillitis [J03.90]    Hypercholesterolemia 8/10/2017    Long-term current use of benzodiazepine     Menopause 8/10/2017    Mild intermittent asthma without complication 1/29/3346    Severe anxiety with panic 8/10/2017       Past Surgical Histor:  Past Surgical History:   Procedure Laterality Date    IMPLANT BREAST SILICONE/EQ Bilateral 2047    subglandular Allergies: Allergies   Allergen Reactions    Pcn [Penicillins] Hives    Sulfa (Sulfonamide Antibiotics) Hives       Medications:  Current Outpatient Medications   Medication Sig Dispense Refill    DULoxetine (CYMBALTA) 20 mg capsule Take 1 Capsule by mouth every other day for 360 days. 15 Capsule 11    ALPRAZolam (XANAX) 0.5 mg tablet TAKE 1 TABLET BY MOUTH THREE TIMES DAILY IF NEEDED FOR ANXIETY 60 Tablet 0    pravastatin (PRAVACHOL) 20 mg tablet Take 1 Tablet by mouth nightly for 360 days. TAKE 1 TABLET EVERY DAY FOR CHOLESTEROL 90 Tablet 3    albuterol (PROVENTIL HFA, VENTOLIN HFA, PROAIR HFA) 90 mcg/actuation inhaler USE 2 PUFFS EVERY FOUR HOURS IF NEEDED FOR SHORTNESS OF BREATH OR WHEEZING 18 g 0    doxycycline (VIBRAMYCIN) 100 mg capsule TAKE 1 CAPSULE TWICE A DAY WITH FOOD 60 Capsule 0    azithromycin (ZITHROMAX) 250 mg tablet Take 1 Tablet by mouth See Admin Instructions. 6 Tablet 0       Social History:  Social History     Socioeconomic History    Marital status:    Tobacco Use    Smoking status: Current Every Day Smoker     Packs/day: 0.50     Years: 30.00     Pack years: 15.00    Smokeless tobacco: Never Used   Vaping Use    Vaping Use: Never used   Substance and Sexual Activity    Alcohol use:  Yes     Alcohol/week: 12.0 standard drinks     Types: 12 Cans of beer per week    Drug use: No       Family History:  Family History   Problem Relation Age of Onset    Cancer Mother         skin, throat    OSTEOARTHRITIS Mother     Anxiety Mother     No Known Problems Father        Immunizations:  Immunization History   Administered Date(s) Administered    COVID-19, PFIZER PURPLE top, DILUTE for use, (age 15 y+), IM, 30mcg/0.3mL 08/27/2021, 09/23/2021        Healthcare Maintenance:  Health Maintenance   Topic Date Due    Cervical cancer screen  Never done    Shingrix Vaccine Age 50> (1 of 2) Never done    COVID-19 Vaccine (3 - Booster for Sheikh Peter series) 02/23/2022    DTaP/Tdap/Td series (1 - Tdap) 12/29/2022 (Originally 4/5/1987)    Pneumococcal 0-64 years (1 - PCV) 12/29/2022 (Originally [birth year]/1972)    Flu Vaccine (1) 09/01/2022    Depression Screen  12/29/2022    Lipid Screen  12/29/2022    Breast Cancer Screen Mammogram  05/11/2024    Colorectal Cancer Screening Combo  08/23/2028    Hepatitis C Screening  Completed        Review of Systems:  ROS:  Review of Systems   Constitutional: Negative. HENT: Negative. Eyes: Negative. Respiratory: Positive for cough and sputum production. Negative for hemoptysis, shortness of breath and wheezing. Cardiovascular: Negative. Negative for chest pain, palpitations, orthopnea, claudication, leg swelling and PND. Gastrointestinal: Negative. Genitourinary: Negative. Musculoskeletal: Negative. Skin: Negative. Neurological: Negative. Endo/Heme/Allergies: Negative. Psychiatric/Behavioral: The patient is nervous/anxious. ROS otherwise negative      Objective:     Vital Signs:  Visit Vitals  /70 (BP 1 Location: Left upper arm, BP Patient Position: Sitting, BP Cuff Size: Adult)   Pulse 82   Temp 98.4 °F (36.9 °C) (Oral)   Resp 18   Ht 5' 4.5\" (1.638 m)   Wt 131 lb 6.4 oz (59.6 kg)   SpO2 96%   BMI 22.21 kg/m²       BMI:  Body mass index is 22.21 kg/m². Physical Examination:  Physical Exam  Constitutional:       Appearance: Normal appearance. She is normal weight. HENT:      Head: Normocephalic and atraumatic. Right Ear: External ear normal.      Left Ear: External ear normal.      Nose: Congestion present. No rhinorrhea. Mouth/Throat:      Mouth: Mucous membranes are moist.      Pharynx: Oropharynx is clear. No oropharyngeal exudate or posterior oropharyngeal erythema. Cardiovascular:      Rate and Rhythm: Normal rate and regular rhythm. Pulses: Normal pulses. Heart sounds: Normal heart sounds. No murmur heard. No friction rub. No gallop.     Pulmonary:      Effort: Pulmonary effort is normal. No respiratory distress. Breath sounds: Normal breath sounds. No wheezing, rhonchi or rales. Abdominal:      General: Abdomen is flat. Bowel sounds are normal. There is no distension. Palpations: Abdomen is soft. Tenderness: There is no abdominal tenderness. There is no guarding. Musculoskeletal:         General: No swelling, tenderness or deformity. Normal range of motion. Cervical back: Normal range of motion and neck supple. No rigidity or tenderness. Skin:     General: Skin is warm and dry. Findings: No erythema, lesion or rash. Neurological:      General: No focal deficit present. Mental Status: She is alert and oriented to person, place, and time. Mental status is at baseline. Sensory: No sensory deficit. Motor: No weakness. Gait: Gait normal.      Deep Tendon Reflexes: Reflexes normal.   Psychiatric:         Mood and Affect: Mood normal.         Behavior: Behavior normal.         Judgment: Judgment normal.          Physical exam otherwise negative    Diagnostic Testing:    Laboratory Studies:  Office Visit on 12/29/2021   Component Date Value Ref Range Status    Hep C virus Ab Interp. 12/29/2021 NONREACTIVE  NONREACTIVE   Final    Method used is Siemens Advia Centaur    TSH 12/29/2021 1.27  0.36 - 3.74 uIU/mL Final    Comment:   Due to TSH heterogeneity, both structurally and degree of glycosylation,  monoclonal antibodies used in the TSH assay may not accurately quantitate TSH. Therefore, this result should be correlated with clinical findings as well as  with other assessments of thyroid function, e.g., free T4, free T3.       Sodium 12/29/2021 139  136 - 145 mmol/L Final    Potassium 12/29/2021 4.2  3.5 - 5.1 mmol/L Final    Chloride 12/29/2021 109* 97 - 108 mmol/L Final    CO2 12/29/2021 25  21 - 32 mmol/L Final    Anion gap 12/29/2021 5  5 - 15 mmol/L Final    Glucose 12/29/2021 95  65 - 100 mg/dL Final    BUN 12/29/2021 12  6 - 20 MG/DL Final    Creatinine 12/29/2021 0.68  0.55 - 1.02 MG/DL Final    BUN/Creatinine ratio 12/29/2021 18  12 - 20   Final    GFR est AA 12/29/2021 >60  >60 ml/min/1.73m2 Final    GFR est non-AA 12/29/2021 >60  >60 ml/min/1.73m2 Final    Comment: Estimated GFR is calculated using the IDMS-traceable Modification of Diet in  Renal Disease (MDRD) Study equation, reported for both  Americans  (GFRAA) and non- Americans (GFRNA), and normalized to 1.73m2 body  surface area. The physician must decide which value applies to the patient.  Calcium 12/29/2021 9.3  8.5 - 10.1 MG/DL Final    Bilirubin, total 12/29/2021 0.4  0.2 - 1.0 MG/DL Final    ALT (SGPT) 12/29/2021 38  12 - 78 U/L Final    AST (SGOT) 12/29/2021 31  15 - 37 U/L Final    Alk. phosphatase 12/29/2021 72  45 - 117 U/L Final    Protein, total 12/29/2021 7.4  6.4 - 8.2 g/dL Final    Albumin 12/29/2021 3.9  3.5 - 5.0 g/dL Final    Globulin 12/29/2021 3.5  2.0 - 4.0 g/dL Final    A-G Ratio 12/29/2021 1.1  1.1 - 2.2   Final    Cholesterol, total 12/29/2021 213* <200 MG/DL Final    Triglyceride 12/29/2021 280* <150 MG/DL Final    Comment: Based on NCEP-ATP III:  Triglycerides <150 mg/dL  is considered normal, 150-199  mg/dL  borderline high,  200-499 mg/dL high and  greater than or equal to 500  mg/dL very high.  HDL Cholesterol 12/29/2021 52  MG/DL Final    Comment: Based on NCEP ATP III, HDL Cholesterol <40 mg/dL is considered low and >60  mg/dL is elevated.       LDL, calculated 12/29/2021 105* 0 - 100 MG/DL Final    Comment: Based on the NCEP-ATP: LDL-C concentrations are considered  optimal <100 mg/dL,  near optimal/above Normal 100-129 mg/dL Borderline High: 130-159, High: 160-189  mg/dL Very High: Greater than or equal to 190 mg/dL      VLDL, calculated 12/29/2021 56  MG/DL Final    CHOL/HDL Ratio 12/29/2021 4.1  0.0 - 5.0   Final    CK 12/29/2021 131  26 - 192 U/L Final    WBC 12/29/2021 6.7  3.6 - 11.0 K/uL Final    RBC 12/29/2021 4.50  3.80 - 5.20 M/uL Final    HGB 12/29/2021 14.2  11.5 - 16.0 g/dL Final    HCT 12/29/2021 43.6  35.0 - 47.0 % Final    MCV 12/29/2021 96.9  80.0 - 99.0 FL Final    MCH 12/29/2021 31.6  26.0 - 34.0 PG Final    MCHC 12/29/2021 32.6  30.0 - 36.5 g/dL Final    RDW 12/29/2021 12.6  11.5 - 14.5 % Final    PLATELET 67/91/8516 728  150 - 400 K/uL Final    MPV 12/29/2021 11.5  8.9 - 12.9 FL Final    NRBC 12/29/2021 0.0  0  WBC Final    ABSOLUTE NRBC 12/29/2021 0.00  0.00 - 0.01 K/uL Final    NEUTROPHILS 12/29/2021 51  32 - 75 % Final    LYMPHOCYTES 12/29/2021 34  12 - 49 % Final    MONOCYTES 12/29/2021 11  5 - 13 % Final    EOSINOPHILS 12/29/2021 3  0 - 7 % Final    BASOPHILS 12/29/2021 1  0 - 1 % Final    IMMATURE GRANULOCYTES 12/29/2021 0  0.0 - 0.5 % Final    ABS. NEUTROPHILS 12/29/2021 3.4  1.8 - 8.0 K/UL Final    ABS. LYMPHOCYTES 12/29/2021 2.3  0.8 - 3.5 K/UL Final    ABS. MONOCYTES 12/29/2021 0.8  0.0 - 1.0 K/UL Final    ABS. EOSINOPHILS 12/29/2021 0.2  0.0 - 0.4 K/UL Final    ABS. BASOPHILS 12/29/2021 0.1  0.0 - 0.1 K/UL Final    ABS. IMM. GRANS. 12/29/2021 0.0  0.00 - 0.04 K/UL Final    DF 12/29/2021 AUTOMATED    Final   Telephone on 09/23/2021   Component Date Value Ref Range Status    Cologuard Test, External 10/15/2021 Negative   Final         Radiographic Studies:  XR Results (most recent):  Results from Hospital Encounter encounter on 12/10/20    XR CHEST PA LAT    Narrative  INDICATION: Cough    COMPARISON: February 16, 2009    FINDINGS: PA and lateral views of the chest demonstrate a stable  cardiomediastinal silhouette and clear lungs bilaterally. The visualized osseous  structures are unremarkable.     Impression  IMPRESSION: Normal chest.     [facility] Results (most recent):  Results from Hospital Encounter encounter on 05/11/22    [facility] MAMMO BI SCREENING INCL CAD    Narrative  STUDY: Bilateral digital screening mammogram    INDICATION: Screening. COMPARISON: Prior studies dating back to 2013    BREAST COMPOSITION:  The breasts are heterogeneously dense, which may obscure  small masses. FINDINGS: Bilateral digital screening mammography was performed and is  interpreted in conjunction with a computer assisted detection (CAD) system. No  suspicious masses or calcifications are identified. There are bilateral  submuscular saline breast implants. There has been no significant change. Impression  BI-RADS 1: Negative. No mammographic evidence of malignancy. RECOMMENDATIONS:  Next screening mammogram is recommended in one year. The patient will be notified of these results. CT Results (most recent):  No results found for this or any previous visit. DEXA Results (most recent):  No results found for this or any previous visit. MRI Results (most recent):  No results found for this or any previous visit. Assessment/Plan:       ICD-10-CM ICD-9-CM    1. Routine adult health maintenance  Z00.00 V70.0    2. Encounter for smoking cessation counseling  Z71.6 V65.42      305.1    3. Severe anxiety with panic  F41.0 300.01 DULoxetine (CYMBALTA) 20 mg capsule   4. Hypercholesterolemia  E78.00 272.0    5. Mild intermittent asthma without complication  Y52.36 921.89 XR CHEST PA LAT      PULMONARY FUNCTION TEST   6. Fibromyalgia  M79.7 729.1 DULoxetine (CYMBALTA) 20 mg capsule   7. Screening for malignant neoplasm of cervix  Z12.4 V76.2 REFERRAL TO GYNECOLOGY   8. Long-term current use of benzodiazepine  Z79.899 V58.69 TOXASSURE SELECT 13 (MW)   9. Chronic cough  R05.3 786.2 XR CHEST PA LAT          Chronic cough:  - Etiology unclear but possibly related to uncontrolled asthma or COPD. Checking chest x-ray, although I do not hear anything on her lungs today. Need to consider possibility of malignancy given her history of smoking, although I doubt this.   Nontuberculous mycobacterial infection or other fungal infection would be a consideration but also would be extremely doubtful given the lack of other systemic symptoms or her other risk factors. Referral to pulmonary medicine for pulmonary function testing. ACT pending. If this is less than 19, then anticipate initiation of Symbicort or Advair. Discussed Flonase, nasal saline rinses for empiric treatment of suspected possible postnasal drip. Healthcare Maintenance:  - Preventive measures are reviewed as per above  - Up to date on routine interventions except as noted above  - Orders placed to update gaps as noted  - Notes: Up-to-date with regard to routine screening examinations. Up-to-date with regard to vaccines. Referral to gynecology for well woman examination. Tobacco Use:   - Smoking History: positive for approximately 0.5 packs per day. Patient advised to quit smoking   - Discussion with Patient today: I advised patient to quit, and offered support. Educational material distributed. Discussed current use pattern. Asked pt to inform me when sets quit date. . Time of Discussion: 5 minutes. - Notes: She says that she has been on Chantix in the past but did not like this. She is precontemplative regarding discontinuation. Anxiety/Depression:   - Current PHQ: No data recorded    - Current RX:   Key Psychotherapeutic Meds             DULoxetine (CYMBALTA) 20 mg capsule (Taking) Take 1 Capsule by mouth every other day for 360 days. - Psychology/Psychiatry Co-managing? No   - On chronic benzodiazepines with ongoing dependence, as prescribed by prior PCP. Trial of Cymbalta as noted, see fibromyalgia section. I advised the patient that a long-term target would be a reduction in her overall pill burden with elimination of the Xanax, possibly converting this to Klonopin instead. Holding off on this change for now. Trial of Cymbalta, with follow-up on this in about 3 to 4 months.     Hyperlipidemia/Dyslipidemia:   - Summary of Cardiovascular Risks and Goals: LDL goal is under 100  hyperlipidemia  smoker   -   Lab Results   Component Value Date/Time    LDL, calculated 105 (H) 12/29/2021 02:00 PM    HDL Cholesterol 52 12/29/2021 02:00 PM       - Relevant Cholesterol Meds:  Key Antihyperlipidemia Meds             pravastatin (PRAVACHOL) 20 mg tablet (Taking) Take 1 Tablet by mouth nightly for 360 days. TAKE 1 TABLET EVERY DAY FOR CHOLESTEROL          - Cholesterol at target: yes   - Does patient meet USPSTF and ACC/AHA indications for pharmacotherapy (e.g., statin): yes   - GI symptoms with meds: NO   - Muscle aches with meds: NO   - Other Adverse effects with meds: NO   - Medication Plan: continue   - Notes: Reasonably close to target LDL, continuing with current care as she appears to be tolerating this medication. Fibromyalgia:   - Trial of duloxetine 20 mg every other day is discussed with patient. She is amenable to a trial of this after discussion regarding the risks and benefits of the medication. Asthma:   - Current Symptoms: taking medications as instructed, no medication side effects noted, no significant ongoing wheezing or shortness of breath, notes increased cough with production   - CAT/ACT: 13   - MMRC: ---   - Current Assessment: Uncontrolled asthma, moderate persistent   - Current Regimen:   Key COPD Medications             albuterol (PROVENTIL HFA, VENTOLIN HFA, PROAIR HFA) 90 mcg/actuation inhaler (Taking) USE 2 PUFFS EVERY FOUR HOURS IF NEEDED FOR SHORTNESS OF BREATH OR WHEEZING         - Plan: orders as documented in [facility], reviewed use of rescue vs controlling agents, oral and inhaled meds and potential side effects, critical need for compliance with treatment plan to achieve optimal results, reviewed medications and side effects in detail, the following changes are made -adding on Symbicort today, referring for PFTs, chest x-ray.         Odell Magdaleno MD    Please note that this dictation was completed with [employer], the computer voice recognition software. Quite often unanticipated grammatical, syntax, homophones, and other interpretive errors are inadvertently transcribed by the computer software. Please disregard these errors. Please excuse any errors that have escaped final proofreading.

## 2022-07-06 ENCOUNTER — OFFICE VISIT (OUTPATIENT)
Dept: INTERNAL MEDICINE CLINIC | Age: 57
End: 2022-07-06
Payer: COMMERCIAL

## 2022-07-06 VITALS
HEART RATE: 82 BPM | OXYGEN SATURATION: 96 % | SYSTOLIC BLOOD PRESSURE: 111 MMHG | BODY MASS INDEX: 21.89 KG/M2 | HEIGHT: 65 IN | TEMPERATURE: 98.4 F | RESPIRATION RATE: 18 BRPM | DIASTOLIC BLOOD PRESSURE: 70 MMHG | WEIGHT: 131.4 LBS

## 2022-07-06 DIAGNOSIS — Z00.00 ROUTINE ADULT HEALTH MAINTENANCE: Primary | ICD-10-CM

## 2022-07-06 DIAGNOSIS — Z71.6 ENCOUNTER FOR SMOKING CESSATION COUNSELING: ICD-10-CM

## 2022-07-06 DIAGNOSIS — R05.3 CHRONIC COUGH: ICD-10-CM

## 2022-07-06 DIAGNOSIS — F41.0 SEVERE ANXIETY WITH PANIC: ICD-10-CM

## 2022-07-06 DIAGNOSIS — Z79.899 LONG-TERM CURRENT USE OF BENZODIAZEPINE: ICD-10-CM

## 2022-07-06 DIAGNOSIS — Z12.4 SCREENING FOR MALIGNANT NEOPLASM OF CERVIX: ICD-10-CM

## 2022-07-06 DIAGNOSIS — J45.20 MILD INTERMITTENT ASTHMA WITHOUT COMPLICATION: ICD-10-CM

## 2022-07-06 DIAGNOSIS — E78.00 HYPERCHOLESTEROLEMIA: ICD-10-CM

## 2022-07-06 DIAGNOSIS — M79.7 FIBROMYALGIA: ICD-10-CM

## 2022-07-06 PROCEDURE — 99214 OFFICE O/P EST MOD 30 MIN: CPT | Performed by: INTERNAL MEDICINE

## 2022-07-06 PROCEDURE — 99406 BEHAV CHNG SMOKING 3-10 MIN: CPT | Performed by: INTERNAL MEDICINE

## 2022-07-06 PROCEDURE — 99396 PREV VISIT EST AGE 40-64: CPT | Performed by: INTERNAL MEDICINE

## 2022-07-06 RX ORDER — BUDESONIDE AND FORMOTEROL FUMARATE DIHYDRATE 80; 4.5 UG/1; UG/1
2 AEROSOL RESPIRATORY (INHALATION) 2 TIMES DAILY
Qty: 10.2 G | Refills: 11 | Status: SHIPPED | OUTPATIENT
Start: 2022-07-06 | End: 2023-07-01

## 2022-07-06 RX ORDER — DULOXETIN HYDROCHLORIDE 20 MG/1
20 CAPSULE, DELAYED RELEASE ORAL EVERY OTHER DAY
Qty: 15 CAPSULE | Refills: 11 | Status: SHIPPED | OUTPATIENT
Start: 2022-07-06 | End: 2023-07-01

## 2022-07-06 NOTE — PROGRESS NOTES
Chief Complaint   Patient presents with   [facility] Establish Care       1. \"Have you been to the ER, urgent care clinic since your last visit? Hospitalized since your last visit? \" No    2. \"Have you seen or consulted any other health care providers outside of the [address] [place] [address] since your last visit? \" No     3. For patients aged 39-70: Has the patient had a colonoscopy / FIT/ Cologuard? No      If the patient is female:    4. For patients aged 41-77: Has the patient had a mammogram within the past 2 years? No      5. For patients aged 21-65: Has the patient had a pap smear?  No

## 2022-07-06 NOTE — PATIENT INSTRUCTIONS
Learning About Benefits From Quitting Smoking  How does quitting smoking make you healthier? If you're thinking about quitting smoking, you may have a few reasons to be smoke-free. Your health may be one of them. · When you quit smoking, you lower your risks for cancer, lung disease, heart attack, stroke, blood vessel disease, and blindness from macular degeneration. · When you're smoke-free, you get sick less often, and you heal faster. You are less likely to get colds, flu, bronchitis, and pneumonia. · As a nonsmoker, you may find that your mood is better and you are less stressed. When and how will you feel healthier? Quitting has real health benefits that start from day 1 of being smoke-free. And the longer you stay smoke-free, the healthier you get and the better you feel. The first hours  · After just 20 minutes, your blood pressure and heart rate go down. That means there's less stress on your heart and blood vessels. · Within 12 hours, the level of carbon monoxide in your blood drops back to normal. That makes room for more oxygen. With more oxygen in your body, you may notice that you have more energy than when you smoked. After 2 weeks  · Your lungs start to work better. · Your risk of heart attack starts to drop. After 1 month  · When your lungs are clear, you cough less and breathe deeper, so it's easier to be active. · Your sense of taste and smell return. That means you can enjoy food more than you have since you started smoking. Over the years  · Over the years, your risks of heart disease, heart attack, and stroke are lower. · After 10 years, your risk of dying from lung cancer is cut by about half. And your risk for many other types of cancer is lower too. How would quitting help others in your life? When you quit smoking, you improve the health of everyone who now breathes in your smoke. · Their heart, lung, and cancer risks drop, much like yours. · They are sick less.  For babies and small children, living smoke-free means they're less likely to have ear infections, pneumonia, and bronchitis. · If you're a woman who is or will be pregnant someday, quitting smoking means a healthier . · Children who are close to you are less likely to become adult smokers. Where can you learn more? Go to http://www.gray.com/  Enter O319 in the search box to learn more about \"Learning About Benefits From Quitting Smoking. \"  Current as of: 2021               Content Version: 13.2   [employer]. Care instructions adapted under license by [employer] (which disclaims liability or warranty for this information). If you have questions about a medical condition or this instruction, always ask your healthcare professional. [place] [address] any warranty or liability for your use of this information. Fluticasone (Into the nose)   Fluticasone (dkqt-GBP-t-sone)  Treats allergy symptoms, such as runny or stuffy nose. This medicine is a corticosteroid. Brand Name(s): Children's Flonase, ClariSpray, DermacinRx [place], DermacinRx [place], [address] [place] [address], Flonase Sensimist, Fluticasone Propionate Novaplus, Ticaspray, Veramyst   There may be other brand names for this medicine. When This Medicine Should Not Be Used: This medicine is not right for everyone. Do not use if you had an allergic reaction to fluticasone. How to Use This Medicine:   Spray  · Your doctor will tell you how much medicine to use. Do not use more than directed. · This medicine is for use only in the nose. Do not get any of it in your eyes or on your skin. If it does get on these areas, rinse it off right away. · Prime the spray: Release 6 test sprays into the air away from the face, or pump the bottle until some of the medicine sprays out. Now it is ready to use.  Prime the spray if it has not been used for more than 7 days (or 30 days for [facility]) or if the cap has been left off the bottle for 5 days or longer. · Shake the medicine well just before each use. · Before using the medicine, gently blow your nose to clear the nostrils. · After using the nasal spray, wipe the tip of the bottle with a clean tissue and put the cap back on. · You may need to use this medicine for a few days before you start to feel better. · Read and follow the patient instructions that come with this medicine. Talk to your doctor or pharmacist if you have any questions. · Follow the instructions on the medicine label if you are using this medicine without a prescription. · Missed dose: Take a dose as soon as you remember. If it is almost time for your next dose, wait until then and take a regular dose. Do not take extra medicine to make up for a missed dose. · Keep the bottle tightly closed when not using it. Store at room temperature, away from heat and direct light. Do not freeze or refrigerate. Throw this medicine away after you use 120 sprays. Drugs and Foods to Avoid:   Ask your doctor or pharmacist before using any other medicine, including over-the-counter medicines, vitamins, and herbal products. · Do not use this medicine together with ritonavir. · Some foods and medicines can affect how fluticasone works. Tell your doctor if you are using ketoconazole. Warnings While Using This Medicine:   · Tell your doctor if you are pregnant or breastfeeding, or if you have liver disease, asthma, an infection, or a history of cataracts or glaucoma. Make sure your doctor knows if you have had nose surgery, a nose injury, or a recent infection in your nose. · This medicine may cause the following problems:  ¨ Holes or ulcers inside the nose  ¨ Slow wound healing  ¨ Cataracts or glaucoma  ¨ Problems with the adrenal glands  ¨ Slow growth in children  · Avoid people who are sick or have infections.  Tell your doctor right away if you think you have been exposed to measles or chickenpox. · Your doctor will check your progress and the effects of this medicine at regular visits. Keep all appointments. · Call your doctor if your symptoms do not improve or if they get worse. · Keep all medicine out of the reach of children. Never share your medicine with anyone. Possible Side Effects While Using This Medicine:   Call your doctor right away if you notice any of these side effects:  · Allergic reaction: Itching or hives, swelling in your face or hands, swelling or tingling in your mouth or throat, chest tightness, trouble breathing  · Burning, redness, swelling, or irritation around or inside your nose  · Eye pain or vision changes  · Fever, chills, cough, sore throat, and body aches  · Heavy nosebleeds  · Sores or white patches inside the nose or mouth  · Tiredness, weakness, dizziness  If you notice other side effects that you think are caused by this medicine, tell your doctor. Call your doctor for medical advice about side effects. You may report side effects to FDA at 2-819-VJO-4140  © 2017 [facility] Information is for End User's use only and may not be sold, redistributed or otherwise used for commercial purposes. The above information is an  only. It is not intended as medical advice for individual conditions or treatments. Talk to your doctor, nurse or pharmacist before following any medical regimen to see if it is safe and effective for you. Saline Nasal Washes: Care Instructions  Overview     Saline nasal washes help keep the nasal passages open by washing out thick or dried mucus. This simple remedy can help relieve symptoms of allergies, sinusitis, and colds. It also can make the nose feel more comfortable by keeping the mucous membranes moist. You may notice a little burning sensation in your nose the first few times you use the solution, but this usually gets better in a few days.   Follow-up care is a key part of your treatment and safety. Be sure to make and go to all appointments, and call your doctor if you are having problems. It's also a good idea to know your test results and keep a list of the medicines you take. How can you care for yourself at home? · You can buy premixed saline solution in a squeeze bottle or other sinus rinse products at a drugstore. Read and follow the instructions on the label. · You also can make your own saline solution by adding 1 teaspoon of non-iodized salt and 1 teaspoon of baking soda to 2 cups of distilled or boiled and cooled water. · If you use a homemade solution, use a squeeze bottle or neti pot to get the solution into your nose. Room temperature or slightly warmed water may be more comfortable. Make sure it isn't hot. · Stand over the sink with your head tilted forward and slightly to one side. Put only the tip of the syringe or squeeze bottle into the nostril that is farther away from the sink. (The nostril closest to the sink will drain the fluid.) Gently squirt the solution into the nostril and toward the back of your head with your mouth open. The solution should flow out the other nostril. Repeat on the other side. Some sneezing and gagging are normal at first.  · Gently blow your nose. · Clean the syringe or bottle after each use. · Repeat this 2 or 3 times a day. · Use nasal washes gently if you have nosebleeds often. When should you call for help? Watch closely for changes in your health, and be sure to contact your doctor if:    · Your symptoms do not get better.     · You have problems doing the nasal washes. Where can you learn more? Go to http://www.gray.com/  Enter B784 in the search box to learn more about \"Saline Nasal Washes: Care Instructions. \"  Current as of: September 8, 2021               Content Version: 13.2  © 0699-6748 [employer].    Care instructions adapted under license by [employer] (which disclaims liability or warranty for this information). If you have questions about a medical condition or this instruction, always ask your healthcare professional. [address] any warranty or liability for your use of this information. Duloxetine (By mouth)   Duloxetine (doo-LOX-e-teen)  Treats depression, anxiety, diabetic peripheral neuropathy, fibromyalgia, and chronic muscle or bone pain. This medicine is an SSNRI. Brand Name(s): Cymbalta, DermacinRx DPN Deana Thomas   There may be other brand names for this medicine. When This Medicine Should Not Be Used: This medicine is not right for everyone. Do not use it if you had an allergic reaction to duloxetine. How to Use This Medicine:   Capsule, Delayed Release Capsule  · Take your medicine as directed. Your dose may need to be changed several times to find what works best for you. · Delayed-release capsule: Swallow the capsule whole. Do not crush, chew, break, or open it. · This medicine should come with a Medication Guide. Ask your pharmacist for a copy if you do not have one. · Missed dose: Take a dose as soon as you remember. If it is almost time for your next dose, wait until then and take a regular dose. Do not take extra medicine to make up for a missed dose. · Store the medicine in a closed container at room temperature, away from heat, moisture, and direct light. Drugs and Foods to Avoid:   Ask your doctor or pharmacist before using any other medicine, including over-the-counter medicines, vitamins, and herbal products. · Do not take duloxetine if you have used an MAO inhibitor (MAOI) within the past 14 days. Do not start taking an MAO inhibitor within 5 days of stopping duloxetine. · Some medicines can affect how duloxetine works.  Tell your doctor if you are using any of the following:  ¨ Buspirone, cimetidine, ciprofloxacin, enoxacin, fentanyl, lithium, Nicole's wort, theophylline, tramadol, tryptophan, or warfarin  ¨ Amphetamines  ¨ Blood pressure medicine  ¨ Diuretic (water pill)  ¨ Medicine for heart rhythm problems (including flecainide, propafenone, quinidine)  ¨ Medicine to treat migraine headaches (including triptans)  ¨ NSAID pain or arthritis medicine (including aspirin, celecoxib, diclofenac, ibuprofen, naproxen)  ¨ Other medicine to treat depression or mood disorders (including amitriptyline, desipramine, fluoxetine, imipramine, nortriptyline, paroxetine)  ¨ Phenothiazine medicine (including thioridazine)  · Tell your doctor if you use anything else that makes you sleepy. Some examples are allergy medicine, narcotic pain medicine, and alcohol. · Do not drink alcohol while you are using this medicine. Warnings While Using This Medicine:   · Tell your doctor if you are pregnant or breastfeeding, or if you have kidney disease, liver disease, diabetes, digestion problems, glaucoma, heart disease, high or low blood pressure, or problems with urination. Tell your doctor if you smoke or you have a history of seizures, or drug or alcohol addiction. · This medicine may cause the following problems:   ¨ Serious liver problems  ¨ Serotonin syndrome (more likely when used with certain other medicines)  ¨ Increased risk of bleeding problems  ¨ Serious skin reactions  ¨ Low sodium levels in the blood  · This medicine can increase thoughts of suicide. Tell your doctor right away if you start to feel depressed and have thoughts about hurting yourself. · This medicine can cause changes in your blood pressure. This may make you dizzy or drowsy. Do not drive or do anything that could be dangerous until you know how this medicine affects you. Stand up slowly to avoid falls. · Do not stop using this medicine suddenly. Your doctor will need to slowly decrease your dose before you stop it completely. · Your doctor will check your progress and the effects of this medicine at regular visits. Keep all appointments.   · Keep all medicine out of the reach of children. Never share your medicine with anyone. Possible Side Effects While Using This Medicine:   Call your doctor right away if you notice any of these side effects:  · Allergic reaction: Itching or hives, swelling in your face or hands, swelling or tingling in your mouth or throat, chest tightness, trouble breathing  · Anxiety, restlessness, fever, fast heartbeat, sweating, muscle spasms, diarrhea, seeing or hearing things that are not there  · Blistering, peeling, red skin rash  · Confusion, weakness, muscle twitching  · Dark urine or pale stools, nausea, vomiting, loss of appetite, stomach pain, yellow skin or eyes  · Decrease in how much or how often you urinate  · Eye pain, vision changes, seeing halos around lights  · Feeling more energetic than usual  · Lightheadedness, dizziness, or fainting  · Unusual moods or behaviors, worsening depression, thoughts about hurting yourself, trouble sleeping  · Unusual bleeding or bruising  If you notice these less serious side effects, talk with your doctor:   · Decrease in appetite or weight  · Dry mouth, constipation, mild nausea  · Unusual drowsiness, sleepiness, or tiredness  If you notice other side effects that you think are caused by this medicine, tell your doctor. Call your doctor for medical advice about side effects. You may report side effects to FDA at 9-535-FDA-3210  © 2017 [facility] Information is for End User's use only and may not be sold, redistributed or otherwise used for commercial purposes. The above information is an  only. It is not intended as medical advice for individual conditions or treatments. Talk to your doctor, nurse or pharmacist before following any medical regimen to see if it is safe and effective for you.     Budesonide/Formoterol (By breathing)   Budesonide (dfd-CAF-ko-nide), Formoterol Fumarate (for-KASHIF-ter-ol FUE-ma-rate)  Treats asthma and chronic obstructive pulmonary disease (COPD). This medicine contains a corticosteroid. Brand Name(s): Symbicort 160/4.5, Symbicort 80/4.5   There may be other brand names for this medicine. When This Medicine Should Not Be Used: You should not use this medicine if you have had an allergic reaction to budesonide or formoterol. You should not use this medicine if your asthma attack has already started, or if you are having a severe asthma attack or COPD flare-up. How to Use This Medicine:   Liquid Under Pressure, Powder  · Your doctor will tell you how much medicine to use. Do not use more than directed. · This medicine should come with a Medication Guide. Ask your pharmacist for a copy if you do not have one. · Test spray in the air before using for the first time or if the inhaler has not been used for a while. · Shake well for 5 seconds before using. You must keep track of the number of puffs you use. Use the dose tracker card to do this. Throw away the inhaler after you have used the number of puffs allowed, or if it is 3 months since you opened the foil pouch. · When you have finished all your inhalations, rinse your mouth out with water. Do not swallow the water after rinsing. If a dose is missed:   · Take a dose as soon as you remember. If it is almost time for your next dose, wait until then and take a regular dose. Do not take extra medicine to make up for a missed dose. How to Store and Dispose of This Medicine:   · Store the canister at room temperature, away from heat and direct light. Do not freeze. Do not keep this medicine inside a car where it could be exposed to extreme heat or cold. Do not poke holes in the canister or throw it into a fire, even if the canister is empty. Store the inhaler with the mouthpiece down. · Ask your pharmacist, doctor, or health caregiver about the best way to dispose of the used medicine container and any leftover medicine.  You will also need to throw away old medicine after the expiration date has passed. · Keep all medicine out of the reach of children. Never share your medicine with anyone. Drugs and Foods to Avoid:   Ask your doctor or pharmacist before using any other medicine, including over-the-counter medicines, vitamins, and herbal products. · Make sure your doctor knows if you are also using clarithromycin (Biaxin®), erythromycin (PCE®), itraconazole (Sporanox®), ketoconazole ([place]), telithromycin Craige [place]ouch), medicine to treat HIV infection (such as atazanavir, indinavir, nelfinavir, ritonavir, saquinavir, Crixivan®, Fotovase®, Invirase®, Norvir®, or Reyataz®), blood pressure medicines (such as atenolol, labetalol, Inderal®, Tenormin®, or Toprol®), or diuretics or \"water pills\" (such as furosemide, hydrochlorothiazide [HCTZ], or Lasix®). Tell your doctor if you are taking a medicine for depression or took a depression medicine in the past 2 weeks such as amitriptyline, doxepin, nefazodone, Elavil®, Eldepryl®, Marplan®, Nardil®, Pamelor®, Parnate®, or Sinequan®. Warnings While Using This Medicine:   · Make sure your doctor knows if you are pregnant or breastfeeding, or if you have liver disease, bone problems (such as osteoporosis), heart or blood vessel disease, heart rhythm problems, high blood pressure, seizures, thyroid problems, diabetes, any kind of infection (especially tuberculosis or herpes infection of the eye), low potassium in the blood, or if you have a weakened immune system. Tell your doctor if you have eye problems (such as a cataract or glaucoma). · Although this medicine decreases the number of asthma episodes, it may increase the chances of a severe asthma episode when they do occur. Talk to your doctor about any questions or concerns that you have. · This should not be the first and only medicine you use for asthma or COPD. This medicine will not stop an asthma attack that has already started.  Your doctor may prescribe another medicine for you to use in case of an acute asthma attack or an acute COPD flare-up. If the other medicine does not work as well, tell your doctor right away. · Take all of your COPD medicines as your doctor ordered. If you use any type of corticosteroid medicine to control your breathing, keep using it as ordered by your doctor. Do not change your doses or stop using your medicines without asking your doctor. · Do not use any other asthma medicine or medicine for breathing problems without talking to your doctor. This medicine should not be used with arformoterol, formoterol, salmeterol, Brovana®, Perforomist, or Serevent® inhalers. · If any of your asthma medicines do not seem to be working as well as usual, call your doctor right away. Do not change your doses or stop using your medicines without asking your doctor. · Call your doctor if your symptoms do not improve or if they get worse. · You may get infections more easily while using this medicine. Avoid people who are sick or have infections. Tell your doctor right away if you have been exposed to someone with chickenpox or measles. · This medicine may cause a fungus infection of the mouth or throat (thrush). Tell your doctor right away if you have white patches in the mouth or throat; or pain when eating or swallowing. · Patients with COPD may be more likely to have pneumonia when taking this medicine. Check with your doctor if you start having an increased sputum (spit) production, change in sputum color, fever, chills, increased cough, or an increase in breathing problems. · Using too much of this medicine or using it for a long time may increase your risk of having adrenal gland problems. Talk to your doctor if you have more than one of these symptoms while you are using this medicine: darkening of the skin; diarrhea; dizziness; fainting; loss of appetite; mental depression; nausea; skin rash; unusual tiredness or weakness; or vomiting.   · This medicine may cause paradoxical bronchospasm, which means your breathing or wheezing will get worse. Paradoxical bronchospasm may be life-threatening. Stop using this medicine and check with your doctor right away if you have coughing, difficulty breathing, shortness of breath, or wheezing after using this medicine. · If you or your child develop a skin rash, hives, or any allergic reaction to this medicine, stop using the medicine and check with your doctor as soon as possible. · This medicine may decrease bone mineral density when used for a long time. A low bone mineral density can cause weak bones or osteoporosis. If you have any questions about this, ask your doctor. · This medicine may cause children to grow more slowly than usual. Talk to your child's doctor if you have any concerns. · Check with your doctor right away if you or your child have blurred vision, difficulty with reading, or any other change in vision while using this medicine. Your doctor may want you to have your eyes checked by an ophthalmologist (eye doctor). Be sure to keep all appointments. · This medicine may affect blood sugar and potassium levels. If you have heart disease or are diabetic and notice a change in the results of your blood or urine sugar or potassium tests, check with your doctor. · Your doctor may want you to carry a medical identification (ID) card stating that you are using this medicine. The card will say that you or your child may need additional medicine during an emergency, a severe asthma attack or other illness, or unusual stress. · Your doctor will check your progress and the effects of this medicine at regular visits. Keep all appointments.   Possible Side Effects While Using This Medicine:   Call your doctor right away if you notice any of these side effects:  · Allergic reaction: Itching or hives, swelling in your face or hands, swelling or tingling in your mouth or throat, chest tightness, trouble breathing  · Changes in vision. · Chest pain, shortness of breath, troubled breathing, tightness in the chest, or wheezing. · Dry mouth, increased thirst, or muscle cramps. · Fast, pounding, or uneven heartbeat. · Fever, chills, cough, runny or stuffy nose, sore throat, and body aches. · Lightheadedness, dizziness, or fainting. · Seizures or tremors. · Sores or white patches on your lips, mouth, or throat. If you notice these less serious side effects, talk with your doctor:   · Anxiety, nervousness, or restlessness. · Headache. · Nausea, vomiting, diarrhea, upset stomach, or stomach pain. · Skin rash. If you notice other side effects that you think are caused by this medicine, tell your doctor. Call your doctor for medical advice about side effects. You may report side effects to FDA at 2-101-FDA-6426  © 2017 [facility] Information is for End User's use only and may not be sold, redistributed or otherwise used for commercial purposes. The above information is an  only. It is not intended as medical advice for individual conditions or treatments. Talk to your doctor, nurse or pharmacist before following any medical regimen to see if it is safe and effective for you.

## 2022-07-12 ENCOUNTER — TELEPHONE (OUTPATIENT)
Dept: INTERNAL MEDICINE CLINIC | Age: 57
End: 2022-07-12

## 2022-07-12 NOTE — TELEPHONE ENCOUNTER
Patient called and stated that she started the Cymbalta yesterday and stated she has had diarrhea all day since. Stated anytime that she eats anything it causes her to have diarrhea. Unsure if this is related to the medication or not.

## 2022-07-14 NOTE — TELEPHONE ENCOUNTER
Patient was informed about discontinue the medication.  Patient would give the medication one more day and call back

## 2022-07-14 NOTE — TELEPHONE ENCOUNTER
It is certainly possible that the medication caused this. However, I would expect this to pass over time as her body adjusts to the medication. If her diarrhea does not improve or is intolerable, she can discontinue the medication. We might consider Lyrica instead.

## 2022-07-20 ENCOUNTER — TELEPHONE (OUTPATIENT)
Dept: INTERNAL MEDICINE CLINIC | Age: 57
End: 2022-07-20

## 2022-07-20 NOTE — TELEPHONE ENCOUNTER
Patient had called saying the cymbalta is still making her go to the bathroom a lot. Patient had mention that her family had said that she was out of it, like she couldn't think.

## 2022-07-21 NOTE — TELEPHONE ENCOUNTER
The patient may discontinue Cymbalta to see if this helps. In the meantime, we will work to wean her off of Xanax longer term as this medication is not suitable as monotherapy for her anxiety.

## 2022-08-09 DIAGNOSIS — F41.0 SEVERE ANXIETY WITH PANIC: Chronic | ICD-10-CM

## 2022-08-09 RX ORDER — ALPRAZOLAM 0.5 MG/1
TABLET ORAL
Qty: 60 TABLET | Refills: 0 | Status: SHIPPED | OUTPATIENT
Start: 2022-08-09 | End: 2022-09-20

## 2022-09-19 DIAGNOSIS — F41.0 SEVERE ANXIETY WITH PANIC: Chronic | ICD-10-CM

## 2022-09-20 RX ORDER — ALPRAZOLAM 0.5 MG/1
TABLET ORAL
Qty: 60 TABLET | Refills: 0 | Status: SHIPPED | OUTPATIENT
Start: 2022-09-20 | End: 2022-09-21 | Stop reason: SDUPTHER

## 2022-09-21 DIAGNOSIS — F41.0 SEVERE ANXIETY WITH PANIC: Chronic | ICD-10-CM

## 2022-09-21 RX ORDER — ALPRAZOLAM 0.5 MG/1
TABLET ORAL
Qty: 15 TABLET | Refills: 0 | Status: SHIPPED | OUTPATIENT
Start: 2022-09-21

## 2022-10-25 DIAGNOSIS — F41.0 SEVERE ANXIETY WITH PANIC: Chronic | ICD-10-CM

## 2022-10-25 NOTE — TELEPHONE ENCOUNTER
PCP: Kenia Castro MD    Last appt: 7/6/2022  Future Appointments   Date Time Provider Barb Eller   11/9/2022  1:15 PM Kenia Castro MD [facility]       Requested Prescriptions     Pending Prescriptions Disp Refills    ALPRAZolam (XANAX) 0.5 mg tablet 90 Tablet 0       Prior labs and Blood pressures:  BP Readings from Last 3 Encounters:   07/06/22 111/70   12/29/21 117/79   06/23/21 126/80     Lab Results   Component Value Date/Time    Sodium 139 12/29/2021 02:00 PM    Potassium 4.2 12/29/2021 02:00 PM    Chloride 109 (H) 12/29/2021 02:00 PM    CO2 25 12/29/2021 02:00 PM    Anion gap 5 12/29/2021 02:00 PM    Glucose 95 12/29/2021 02:00 PM    BUN 12 12/29/2021 02:00 PM    Creatinine 0.68 12/29/2021 02:00 PM    BUN/Creatinine ratio 18 12/29/2021 02:00 PM    GFR est AA >60 12/29/2021 02:00 PM    GFR est non-AA >60 12/29/2021 02:00 PM    Calcium 9.3 12/29/2021 02:00 PM     Lab Results   Component Value Date/Time    Hemoglobin A1c (POC) 5.4 04/23/2018 10:01 AM     Lab Results   Component Value Date/Time    Cholesterol, total 213 (H) 12/29/2021 02:00 PM    Cholesterol (POC) 195.0 04/23/2018 10:01 AM    HDL Cholesterol 52 12/29/2021 02:00 PM    HDL Cholesterol (POC) 72.0 04/23/2018 10:01 AM    LDL Cholesterol (POC) 109.2 04/23/2018 10:01 AM    LDL, calculated 105 (H) 12/29/2021 02:00 PM    VLDL, calculated 56 12/29/2021 02:00 PM    Triglyceride 280 (H) 12/29/2021 02:00 PM    Triglycerides (POC) 69.0 04/23/2018 10:01 AM    CHOL/HDL Ratio 4.1 12/29/2021 02:00 PM     No results found for: Kathie Espitia [id number]    Lab Results   Component Value Date/Time    TSH 1.27 12/29/2021 02:00 PM    TSH, 3rd generation 0.78 12/10/2020 10:34 AM

## 2022-10-26 DIAGNOSIS — F41.0 SEVERE ANXIETY WITH PANIC: Chronic | ICD-10-CM

## 2022-10-26 RX ORDER — ALPRAZOLAM 0.5 MG/1
TABLET ORAL
Qty: 90 TABLET | Refills: 0 | Status: SHIPPED | OUTPATIENT
Start: 2022-10-26 | End: 2022-11-29

## 2022-11-09 ENCOUNTER — OFFICE VISIT (OUTPATIENT)
Dept: INTERNAL MEDICINE CLINIC | Age: 57
End: 2022-11-09
Payer: COMMERCIAL

## 2022-11-09 VITALS
HEIGHT: 65 IN | HEART RATE: 84 BPM | SYSTOLIC BLOOD PRESSURE: 120 MMHG | DIASTOLIC BLOOD PRESSURE: 84 MMHG | WEIGHT: 129.5 LBS | BODY MASS INDEX: 21.58 KG/M2 | OXYGEN SATURATION: 98 % | RESPIRATION RATE: 18 BRPM

## 2022-11-09 DIAGNOSIS — B02.9 HERPES ZOSTER WITHOUT COMPLICATION: ICD-10-CM

## 2022-11-09 DIAGNOSIS — Z71.6 ENCOUNTER FOR SMOKING CESSATION COUNSELING: ICD-10-CM

## 2022-11-09 DIAGNOSIS — F41.8 ANXIETY WITH DEPRESSION: ICD-10-CM

## 2022-11-09 DIAGNOSIS — L70.0 ACNE VULGARIS: ICD-10-CM

## 2022-11-09 DIAGNOSIS — M79.7 FIBROMYALGIA: ICD-10-CM

## 2022-11-09 DIAGNOSIS — E78.00 HYPERCHOLESTEROLEMIA: ICD-10-CM

## 2022-11-09 DIAGNOSIS — Z00.00 ROUTINE ADULT HEALTH MAINTENANCE: Primary | ICD-10-CM

## 2022-11-09 PROCEDURE — 99214 OFFICE O/P EST MOD 30 MIN: CPT | Performed by: INTERNAL MEDICINE

## 2022-11-09 PROCEDURE — 99396 PREV VISIT EST AGE 40-64: CPT | Performed by: INTERNAL MEDICINE

## 2022-11-09 PROCEDURE — 99406 BEHAV CHNG SMOKING 3-10 MIN: CPT | Performed by: INTERNAL MEDICINE

## 2022-11-09 RX ORDER — PREGABALIN 75 MG/1
75 CAPSULE ORAL 2 TIMES DAILY
Qty: 60 CAPSULE | Refills: 0 | Status: SHIPPED | OUTPATIENT
Start: 2022-11-09 | End: 2022-12-09

## 2022-11-09 RX ORDER — DOXYCYCLINE 100 MG/1
100 TABLET ORAL DAILY
Qty: 30 TABLET | Refills: 0 | Status: SHIPPED | OUTPATIENT
Start: 2022-11-09 | End: 2022-12-09

## 2022-11-09 RX ORDER — VALACYCLOVIR HYDROCHLORIDE 1 G/1
1000 TABLET, FILM COATED ORAL 3 TIMES DAILY
Qty: 21 TABLET | Refills: 0 | Status: SHIPPED | OUTPATIENT
Start: 2022-11-09 | End: 2022-11-16

## 2022-11-09 NOTE — PROGRESS NOTES
ICD-10-CM ICD-9-CM    1. Routine adult health maintenance  Z00.00 V70.0 HEMOGLOBIN A1C WITH EAG      CBC WITH AUTOMATED DIFF      MICROALBUMIN, UR, RAND W/ MICROALB/CREAT RATIO      2. Encounter for smoking cessation counseling  Z71.6 V65.42      305.1       3. Acne vulgaris  L70.0 706.1 doxycycline (ADOXA) 100 mg tablet      4. Hypercholesterolemia  Z90.87 444.5 METABOLIC PANEL, COMPREHENSIVE      LIPID PANEL      5. Herpes zoster without complication  H95.0 781.1 valACYclovir (VALTREX) 1 gram tablet      6. Fibromyalgia  M79.7 729.1       7. Anxiety with depression  F41.8 300.4 pregabalin (LYRICA) 75 mg capsule               Subjective:     Chief Complaint   Patient presents with    Follow-up       Mony Gayle is a 65 y.o. F.  She has a past medical history of fibromyalgia, anxiety with panic, current smoking, and hypercholesterolemia. I reviewed and updated the medical record. I had seen this patient most recently back in July for establishment. At the time, the patient was complaining of a cough associate with wheezing, likely related to her smoking, as well as continued anxiety, for which she was using long-term Xanax as prescribed to her by her previous primary care provider. She was given a trial of duloxetine for her anxiety and panic disorder with fibromyalgia, and also started on Symbicort for her wheezing. Today, the patient comes in for follow-up. She says that she was not able to tolerate duloxetine, and had discontinued secondary to excess sedation and \"feeling like a zombie\" after about 2 weeks. However, she has continued on Xanax, as before, using this 3 times daily for her anxiety, saying this is the only medication that could possibly help her with her anxiety. She denies having had any excess sedation or other problems with the use of this medication. She continues to drink alcohol regularly.     She also complains today of continued diffuse back pain and joint pain and muscle aches.  She describes pain going down her legs from time to time as well. She notes that most recently, she had developed a rash over this area and says that overall the rash is consistent with shingles, which she has had in the past.  She notes that she has been on Valtrex in the past for this, and had tolerated well. She continues on pravastatin for her history of hypercholesterolemia and denies having had any myalgias or GI symptoms with the use of this medication. No history of cardiovascular or cerebrovascular disease. No chest pain, shortness of breath, or any further cardiopulmonary symptoms. She continues on Symbicort for her asthma/COPD, and after the addition of the medication has noted significant improvement in her coughing and wheezing without any interval change in her exercise tolerance. No recent use of systemic corticosteroids for an asthma or COPD exacerbation. No hemoptysis or fevers or chills. She refuses flu vaccination today. She is due for routine fasting laboratory studies. She remains precontemplative regarding smoking cessation, saying that she does not inhale. Her review of systems is otherwise negative. Routine Healthcare Maintenance issues are reviewed and discussed with the patient as noted below. Orders to update gaps in healthcare maintenance were placed as noted below in the Assessment and Plan, where applicable. 10-year Cardiovascular Risk Praveenelarden Nixon, 2013):   The 10-year ASCVD risk score ([place] FALCON, et al., 2019) is: 5.1%    Values used to calculate the score:      Age: 65 years      Sex: Female      Is Non- : No      Diabetic: No      Tobacco smoker: Yes      Systolic Blood Pressure: 691 mmHg      Is BP treated: No      HDL Cholesterol: 52 MG/DL      Total Cholesterol: 213 MG/DL       Past Medical History:  Past Medical History:   Diagnosis Date    Acne vulgaris 8/10/2017    Cigarette nicotine dependence without complication 9/37/9579 Fibromyalgia 8/10/2017    GERD without esophagitis 8/10/2017    History of tonsillitis 08/10/2017    Lingual tonsillitis [J03.90]    Hypercholesterolemia 8/10/2017    Long-term current use of benzodiazepine     Menopause 8/10/2017    Mild intermittent asthma without complication 0/53/8259    Severe anxiety with panic 8/10/2017       Past Surgical Histor:  Past Surgical History:   Procedure Laterality Date    IMPLANT BREAST SILICONE/EQ Bilateral 5269    subglandular       Allergies: Allergies   Allergen Reactions    Pcn [Penicillins] Hives    Sulfa (Sulfonamide Antibiotics) Hives       Medications:  Current Outpatient Medications   Medication Sig Dispense Refill    doxycycline (ADOXA) 100 mg tablet Take 1 Tablet by mouth daily for 30 days. Indications: acne 30 Tablet 0    valACYclovir (VALTREX) 1 gram tablet Take 1 Tablet by mouth three (3) times daily for 7 days. 21 Tablet 0    pregabalin (LYRICA) 75 mg capsule Take 1 Capsule by mouth two (2) times a day for 30 days. Max Daily Amount: 150 mg. Indications: disorder characterized by stiff, tender & painful muscles, repeated episodes of anxiety 60 Capsule 0    ALPRAZolam (XANAX) 0.5 mg tablet TAKE 1 TABLET THREE TIMES DAILY IF NEEDED FOR ANXIETY 90 Tablet 0    budesonide-formoteroL (SYMBICORT) 80-4.5 mcg/actuation HFAA Take 2 Puffs by inhalation two (2) times a day for 360 days. 10.2 g 11    pravastatin (PRAVACHOL) 20 mg tablet Take 1 Tablet by mouth nightly for 360 days.  TAKE 1 TABLET EVERY DAY FOR CHOLESTEROL 90 Tablet 3    albuterol (PROVENTIL HFA, VENTOLIN HFA, PROAIR HFA) 90 mcg/actuation inhaler USE 2 PUFFS EVERY FOUR HOURS IF NEEDED FOR SHORTNESS OF BREATH OR WHEEZING 18 g 0       Social History:  Social History     Socioeconomic History    Marital status:    Tobacco Use    Smoking status: Every Day     Packs/day: 0.50     Years: 30.00     Pack years: 15.00     Types: Cigarettes    Smokeless tobacco: Never   Vaping Use    Vaping Use: Never used Substance and Sexual Activity    Alcohol use: Yes     Alcohol/week: 12.0 standard drinks     Types: 12 Cans of beer per week    Drug use: No       Family History:  Family History   Problem Relation Age of Onset    Cancer Mother         skin, throat    OSTEOARTHRITIS Mother     Anxiety Mother     No Known Problems Father        Immunizations:  Immunization History   Administered Date(s) Administered    COVID-19, PFIZER PURPLE top, DILUTE for use, (age 15 y+), IM, 30mcg/0.3mL 08/27/2021, 09/23/2021        Healthcare Maintenance:  Health Maintenance   Topic Date Due    Cervical cancer screen  Never done    Shingrix Vaccine Age 50> (1 of 2) Never done    COVID-19 Vaccine (3 - Booster for Pfizer series) 11/18/2021    Flu Vaccine (1) Never done    DTaP/Tdap/Td series (1 - Tdap) 12/29/2022 (Originally 4/5/1987)    Pneumococcal 0-64 years (1 - PCV) 12/29/2022 (Originally [birth year]/1972)    Lipid Screen  12/29/2022    Depression Monitoring  07/06/2023    Breast Cancer Screen Mammogram  05/11/2024    Colorectal Cancer Screening Combo  08/23/2028    Hepatitis C Screening  Completed        Review of Systems:  ROS:  Review of Systems   Constitutional: Negative. HENT: Negative. Eyes: Negative. Respiratory: Negative. Cardiovascular: Negative. Gastrointestinal: Negative. Genitourinary: Negative. Musculoskeletal:  Positive for back pain, joint pain, myalgias and neck pain. Negative for falls. Skin: Negative. Neurological: Negative. Endo/Heme/Allergies: Negative. Psychiatric/Behavioral: Negative. ROS otherwise negative      Objective:     Vital Signs:  Visit Vitals  /84 (BP 1 Location: Right arm, BP Patient Position: Sitting, BP Cuff Size: Adult)   Pulse 84   Resp 18   Ht 5' 4.5\" (1.638 m)   Wt 129 lb 8 oz (58.7 kg)   SpO2 98%   BMI 21.89 kg/m²       BMI:  Body mass index is 21.89 kg/m². Physical Examination:  Physical Exam  Constitutional:       Appearance: Normal appearance.  She is normal weight. HENT:      Head: Normocephalic and atraumatic. Right Ear: External ear normal.      Left Ear: External ear normal.      Nose: Nose normal.      Mouth/Throat:      Mouth: Mucous membranes are moist.      Pharynx: Oropharynx is clear. No oropharyngeal exudate or posterior oropharyngeal erythema. Cardiovascular:      Rate and Rhythm: Normal rate and regular rhythm. Pulses: Normal pulses. Heart sounds: Normal heart sounds. No murmur heard. No friction rub. No gallop. Pulmonary:      Effort: Pulmonary effort is normal. No respiratory distress. Breath sounds: Normal breath sounds. No wheezing, rhonchi or rales. Abdominal:      General: Abdomen is flat. Bowel sounds are normal. There is no distension. Palpations: Abdomen is soft. Tenderness: There is no abdominal tenderness. There is no guarding. Musculoskeletal:         General: No swelling, tenderness or deformity. Normal range of motion. Cervical back: Normal range of motion and neck supple. No rigidity or tenderness. Skin:     General: Skin is warm and dry. Findings: No erythema, lesion or rash. Neurological:      General: No focal deficit present. Mental Status: She is alert and oriented to person, place, and time. Mental status is at baseline. Sensory: No sensory deficit. Motor: No weakness. Gait: Gait normal.      Deep Tendon Reflexes: Reflexes normal.   Psychiatric:         Mood and Affect: Mood normal.         Behavior: Behavior normal.         Judgment: Judgment normal.        Physical exam otherwise negative    Diagnostic Testing:    Laboratory Studies:  No visits with results within 6 Month(s) from this visit. Latest known visit with results is:   Office Visit on 12/29/2021   Component Date Value Ref Range Status    Hep C virus Ab Interp.  12/29/2021 NONREACTIVE  NONREACTIVE   Final    Method used is Siemens Advia Centaur    TSH 12/29/2021 1.27  0.36 - 3.74 uIU/mL Final Comment:   Due to TSH heterogeneity, both structurally and degree of glycosylation,  monoclonal antibodies used in the TSH assay may not accurately quantitate TSH. Therefore, this result should be correlated with clinical findings as well as  with other assessments of thyroid function, e.g., free T4, free T3. Sodium 12/29/2021 139  136 - 145 mmol/L Final    Potassium 12/29/2021 4.2  3.5 - 5.1 mmol/L Final    Chloride 12/29/2021 109 (A)  97 - 108 mmol/L Final    CO2 12/29/2021 25  21 - 32 mmol/L Final    Anion gap 12/29/2021 5  5 - 15 mmol/L Final    Glucose 12/29/2021 95  65 - 100 mg/dL Final    BUN 12/29/2021 12  6 - 20 MG/DL Final    Creatinine 12/29/2021 0.68  0.55 - 1.02 MG/DL Final    BUN/Creatinine ratio 12/29/2021 18  12 - 20   Final    GFR est AA 12/29/2021 >60  >60 ml/min/1.73m2 Final    GFR est non-AA 12/29/2021 >60  >60 ml/min/1.73m2 Final    Comment: Estimated GFR is calculated using the IDMS-traceable Modification of Diet in  Renal Disease (MDRD) Study equation, reported for both  Americans  (GFRAA) and non- Americans (GFRNA), and normalized to 1.73m2 body  surface area. The physician must decide which value applies to the patient. Calcium 12/29/2021 9.3  8.5 - 10.1 MG/DL Final    Bilirubin, total 12/29/2021 0.4  0.2 - 1.0 MG/DL Final    ALT (SGPT) 12/29/2021 38  12 - 78 U/L Final    AST (SGOT) 12/29/2021 31  15 - 37 U/L Final    Alk. phosphatase 12/29/2021 72  45 - 117 U/L Final    Protein, total 12/29/2021 7.4  6.4 - 8.2 g/dL Final    Albumin 12/29/2021 3.9  3.5 - 5.0 g/dL Final    Globulin 12/29/2021 3.5  2.0 - 4.0 g/dL Final    A-G Ratio 12/29/2021 1.1  1.1 - 2.2   Final    Cholesterol, total 12/29/2021 213 (A)  <200 MG/DL Final    Triglyceride 12/29/2021 280 (A)  <150 MG/DL Final    Comment: Based on NCEP-ATP III:  Triglycerides <150 mg/dL  is considered normal, 150-199  mg/dL  borderline high,  200-499 mg/dL high and  greater than or equal to 500  mg/dL very high.       HDL Cholesterol 12/29/2021 52  MG/DL Final    Comment: Based on NCEP ATP III, HDL Cholesterol <40 mg/dL is considered low and >60  mg/dL is elevated. LDL, calculated 12/29/2021 105 (A)  0 - 100 MG/DL Final    Comment: Based on the NCEP-ATP: LDL-C concentrations are considered  optimal <100 mg/dL,  near optimal/above Normal 100-129 mg/dL Borderline High: 130-159, High: 160-189  mg/dL Very High: Greater than or equal to 190 mg/dL      VLDL, calculated 12/29/2021 56  MG/DL Final    CHOL/HDL Ratio 12/29/2021 4.1  0.0 - 5.0   Final    CK 12/29/2021 131  26 - 192 U/L Final    WBC 12/29/2021 6.7  3.6 - 11.0 K/uL Final    RBC 12/29/2021 4.50  3.80 - 5.20 M/uL Final    HGB 12/29/2021 14.2  11.5 - 16.0 g/dL Final    HCT 12/29/2021 43.6  35.0 - 47.0 % Final    MCV 12/29/2021 96.9  80.0 - 99.0 FL Final    MCH 12/29/2021 31.6  26.0 - 34.0 PG Final    MCHC 12/29/2021 32.6  30.0 - 36.5 g/dL Final    RDW 12/29/2021 12.6  11.5 - 14.5 % Final    PLATELET 67/26/4720 766  150 - 400 K/uL Final    MPV 12/29/2021 11.5  8.9 - 12.9 FL Final    NRBC 12/29/2021 0.0  0  WBC Final    ABSOLUTE NRBC 12/29/2021 0.00  0.00 - 0.01 K/uL Final    NEUTROPHILS 12/29/2021 51  32 - 75 % Final    LYMPHOCYTES 12/29/2021 34  12 - 49 % Final    MONOCYTES 12/29/2021 11  5 - 13 % Final    EOSINOPHILS 12/29/2021 3  0 - 7 % Final    BASOPHILS 12/29/2021 1  0 - 1 % Final    IMMATURE GRANULOCYTES 12/29/2021 0  0.0 - 0.5 % Final    ABS. NEUTROPHILS 12/29/2021 3.4  1.8 - 8.0 K/UL Final    ABS. LYMPHOCYTES 12/29/2021 2.3  0.8 - 3.5 K/UL Final    ABS. MONOCYTES 12/29/2021 0.8  0.0 - 1.0 K/UL Final    ABS. EOSINOPHILS 12/29/2021 0.2  0.0 - 0.4 K/UL Final    ABS. BASOPHILS 12/29/2021 0.1  0.0 - 0.1 K/UL Final    ABS. IMM. GRANS.  12/29/2021 0.0  0.00 - 0.04 K/UL Final    DF 12/29/2021 AUTOMATED    Final         Radiographic Studies:  XR Results (most recent):  Results from [place] encounter on 07/06/22    XR CHEST PA LAT    Narrative  EXAM: XR CHEST PA LAT    INDICATION: Asthma, chronic cough    COMPARISON: 12/10/2020. FINDINGS: PA and lateral radiographs of the chest demonstrate clear lungs and  pleural margins. The cardiac and mediastinal contours and pulmonary vascularity  are normal. No hilar enlargement is shown. No substantial osseous abnormality is  demonstrated. Impression  No acute findings. [facility] Results (most recent):  Results from Hospital Encounter encounter on 05/11/22    [facility] MAMMO BI SCREENING INCL CAD    Narrative  STUDY: Bilateral digital screening mammogram    INDICATION:  Screening. COMPARISON: Prior studies dating back to 2013    BREAST COMPOSITION:  The breasts are heterogeneously dense, which may obscure  small masses. FINDINGS: Bilateral digital screening mammography was performed and is  interpreted in conjunction with a computer assisted detection (CAD) system. No  suspicious masses or calcifications are identified. There are bilateral  submuscular saline breast implants. There has been no significant change. Impression  BI-RADS 1: Negative. No mammographic evidence of malignancy. RECOMMENDATIONS:  Next screening mammogram is recommended in one year. The patient will be notified of these results. CT Results (most recent):  No results found for this or any previous visit. DEXA Results (most recent):  No results found for this or any previous visit. MRI Results (most recent):  No results found for this or any previous visit. Assessment/Plan:       ICD-10-CM ICD-9-CM    1. Routine adult health maintenance  Z00.00 V70.0 HEMOGLOBIN A1C WITH EAG      CBC WITH AUTOMATED DIFF      MICROALBUMIN, UR, RAND W/ MICROALB/CREAT RATIO      2. Encounter for smoking cessation counseling  Z71.6 V65.42      305.1       3. Acne vulgaris  L70.0 706.1 doxycycline (ADOXA) 100 mg tablet      4. Hypercholesterolemia  B29.62 083.8 METABOLIC PANEL, COMPREHENSIVE      LIPID PANEL      5.  Herpes zoster without complication B02.9 053.9 valACYclovir (VALTREX) 1 gram tablet      6. Fibromyalgia  M79.7 729.1       7. Anxiety with depression  F41.8 300.4 pregabalin (LYRICA) 75 mg capsule             Shingles:   - suspected given characteristic history, patient clinical factors. Valtrex prescribed. Fibromyalgia:    - As per history. Did not tolerate duloxetine by her report, though she has notably been unable to tolerate any other medications other than benzodiazepines in the past. Trial of pregabalin given history of anxiety, hopefully will be able to reduce dependence on BZDs in time. Healthcare Maintenance:  - Preventive measures are reviewed as per above  - Up to date on routine interventions except as noted above  - Orders placed to update gaps as noted  - Notes: fasting labs ordered; patient refuses vaccines when offered today    Hyperlipidemia/Dyslipidemia:   - Summary of Cardiovascular Risks and Goals:     LDL goal is under 100  hypertension  hyperlipidemia  smoker   -   Lab Results   Component Value Date/Time    LDL, calculated 105 (H) 12/29/2021 02:00 PM    HDL Cholesterol 52 12/29/2021 02:00 PM       - Relevant Cholesterol Meds:  Key Antihyperlipidemia Meds               pravastatin (PRAVACHOL) 20 mg tablet (Taking) Take 1 Tablet by mouth nightly for 360 days. TAKE 1 TABLET EVERY DAY FOR CHOLESTEROL              - Cholesterol at target: reasonably close   - Does patient meet USPSTF and ACC/AHA indications for pharmacotherapy (e.g., statin): yes   - GI symptoms with meds: NO   - Muscle aches with meds: NO   - Other Adverse effects with meds: NO   - Medication Plan: continue   - Notes: CCM    Anxiety/Depression:   - Current PHQ: No data recorded    - Current RX:   Key Psychotherapeutic Meds       Patient is on no psychotherapeutic meds. Key Neurological Meds               pregabalin (LYRICA) 75 mg capsule (Taking) Take 1 Capsule by mouth two (2) times a day for 30 days. Max Daily Amount: 150 mg.  Indications: disorder characterized by stiff, tender & painful muscles, repeated episodes of anxiety           - Psychology/Psychiatry Co-managing? No   - strongly consider Psych referral; trial of pregabalin for now given inability to tolerate trials of other medications like duloxetine; she also claims intolerance to venlafaxine or basically any other agent besides BZDs, which I find questionable. Tobacco Use:   - Smoking History: positive for approximately 1 packs per day. Patient advised to quit smoking   - Discussion with Patient today: I advised patient to quit, and offered support. Educational material distributed. Discussed current use pattern. Asked pt to inform me when sets quit date. . Time of Discussion: 5 minutes. - Notes: patient remains precontemplative despite extensive counseling. I have reviewed the patient's medical history in detail and updated the computerized patient record. We had a prolonged discussion about these complex clinical issues and went over the various important aspects to consider. All questions were answered. Advised the patient to call back or return to office if symptoms do not improve, change in nature, or persist.     The patient was given an after visit summary or informed of MyChart Access which includes patient instructions, diagnoses, current medications, & vitals. she expressed understanding with the diagnosis and plan. Whitney Nazario MD    Please note that this dictation was completed with [employer], the computer voice recognition software. Quite often unanticipated grammatical, syntax, homophones, and other interpretive errors are inadvertently transcribed by the computer software. Please disregard these errors. Please excuse any errors that have escaped final proofreading.

## 2022-11-09 NOTE — PROGRESS NOTES
Chief Complaint   Patient presents with    Follow-up       1. \"Have you been to the ER, urgent care clinic since your last visit? Hospitalized since your last visit? \" No    2. \"Have you seen or consulted any other health care providers outside of the [address] [place] [address] since your last visit? \" No     3. For patients aged 39-70: Has the patient had a colonoscopy / FIT/ Cologuard? No      If the patient is female:    4. For patients aged 41-77: Has the patient had a mammogram within the past 2 years? No      5. For patients aged 21-65: Has the patient had a pap smear?  No

## 2022-11-29 DIAGNOSIS — F41.0 SEVERE ANXIETY WITH PANIC: Chronic | ICD-10-CM

## 2022-11-29 RX ORDER — ALPRAZOLAM 0.5 MG/1
TABLET ORAL
Qty: 90 TABLET | Refills: 0 | Status: SHIPPED | OUTPATIENT
Start: 2022-11-29

## 2022-12-29 DIAGNOSIS — F41.0 SEVERE ANXIETY WITH PANIC: Chronic | ICD-10-CM

## 2022-12-29 RX ORDER — ALPRAZOLAM 0.5 MG/1
TABLET ORAL
Qty: 90 TABLET | Refills: 0 | Status: SHIPPED | OUTPATIENT
Start: 2022-12-29

## 2023-02-08 DIAGNOSIS — F41.0 SEVERE ANXIETY WITH PANIC: Chronic | ICD-10-CM

## 2023-02-09 RX ORDER — ALPRAZOLAM 0.5 MG/1
TABLET ORAL
Qty: 90 TABLET | Refills: 0 | Status: SHIPPED | OUTPATIENT
Start: 2023-02-09

## 2023-02-16 PROBLEM — E78.00 HYPERCHOLESTEROLEMIA: Chronic | Status: RESOLVED | Noted: 2017-08-10 | Resolved: 2023-02-16

## 2023-02-16 PROBLEM — M25.561 ACUTE PAIN OF BOTH KNEES: Status: RESOLVED | Noted: 2019-11-06 | Resolved: 2023-02-16

## 2023-02-16 PROBLEM — E78.00 HYPERCHOLESTEROLEMIA: Status: RESOLVED | Noted: 2017-08-10 | Resolved: 2023-02-16

## 2023-02-16 PROBLEM — M25.562 ACUTE PAIN OF BOTH KNEES: Status: RESOLVED | Noted: 2019-11-06 | Resolved: 2023-02-16

## 2023-02-16 PROBLEM — E78.2 MIXED HYPERLIPIDEMIA: Status: ACTIVE | Noted: 2023-02-16

## 2023-02-17 ENCOUNTER — OFFICE VISIT (OUTPATIENT)
Dept: INTERNAL MEDICINE CLINIC | Age: 58
End: 2023-02-17
Payer: COMMERCIAL

## 2023-02-17 VITALS
OXYGEN SATURATION: 98 % | DIASTOLIC BLOOD PRESSURE: 66 MMHG | HEART RATE: 76 BPM | TEMPERATURE: 98.4 F | HEIGHT: 65 IN | SYSTOLIC BLOOD PRESSURE: 108 MMHG | BODY MASS INDEX: 21.19 KG/M2 | WEIGHT: 127.2 LBS

## 2023-02-17 DIAGNOSIS — F17.210 CIGARETTE NICOTINE DEPENDENCE WITHOUT COMPLICATION: Chronic | ICD-10-CM

## 2023-02-17 DIAGNOSIS — K21.9 GERD WITHOUT ESOPHAGITIS: Chronic | ICD-10-CM

## 2023-02-17 DIAGNOSIS — J01.00 ACUTE NON-RECURRENT MAXILLARY SINUSITIS: ICD-10-CM

## 2023-02-17 DIAGNOSIS — F41.0 SEVERE ANXIETY WITH PANIC: Chronic | ICD-10-CM

## 2023-02-17 DIAGNOSIS — J45.20 MILD INTERMITTENT ASTHMA WITHOUT COMPLICATION: Primary | Chronic | ICD-10-CM

## 2023-02-17 DIAGNOSIS — E78.2 MIXED HYPERLIPIDEMIA: ICD-10-CM

## 2023-02-17 RX ORDER — AZITHROMYCIN 250 MG/1
250 TABLET, FILM COATED ORAL SEE ADMIN INSTRUCTIONS
Qty: 6 TABLET | Refills: 0 | Status: SHIPPED | OUTPATIENT
Start: 2023-02-17 | End: 2023-02-22

## 2023-02-17 NOTE — PROGRESS NOTES
Chief Complaint   Patient presents with    Establish Care    Cholesterol Problem           Anxiety    Nicotine Dependence       SUBJECTIVE:    Colton Truong is a 65 y.o. female who returns in follow-up for her medical problems and establishment of care with me. She is followed for asthma, history of anxiety with panic attacks, GERD, hyperlipidemia and unfortunate continued tobacco abuse and former smoking. She currently denies any chest pain, shortness of breath, palpitations, PND, orthopnea or other cardiac respiratory complaints. She notes no GI or  complaints. She has no headaches, dizziness or neurologic complaints. She has no current active arthritic complaints and there are no other complaints on complete review of systems except she has noticed some head and nasal congestion with a bit of purulent drainage. .      Current Outpatient Medications   Medication Sig Dispense Refill    azithromycin (ZITHROMAX) 250 mg tablet Take 1 Tablet by mouth See Admin Instructions for 5 days. Take 2 tablets the first day, then 1 tablet daily for the next four days. 6 Tablet 0    ALPRAZolam (XANAX) 0.5 mg tablet TAKE 1 TABLET THREE TIMES DAILY IF NEEDED FOR ANXIETY 90 Tablet 0    budesonide-formoteroL (SYMBICORT) 80-4.5 mcg/actuation HFAA Take 2 Puffs by inhalation two (2) times a day for 360 days. 10.2 g 11    pravastatin (PRAVACHOL) 20 mg tablet Take 1 Tablet by mouth nightly for 360 days.  TAKE 1 TABLET EVERY DAY FOR CHOLESTEROL 90 Tablet 3    albuterol (PROVENTIL HFA, VENTOLIN HFA, PROAIR HFA) 90 mcg/actuation inhaler USE 2 PUFFS EVERY FOUR HOURS IF NEEDED FOR SHORTNESS OF BREATH OR WHEEZING 18 g 0     Past Medical History:   Diagnosis Date    Acne vulgaris 8/10/2017    Cigarette nicotine dependence without complication 1/44/4082    Fibromyalgia 8/10/2017    GERD without esophagitis 8/10/2017    History of tonsillitis 08/10/2017    Lingual tonsillitis [J03.90]    Hypercholesterolemia 8/10/2017    Long-term current use of benzodiazepine     Menopause 8/10/2017    Mild intermittent asthma without complication 8/00/9665    Severe anxiety with panic 8/10/2017     Past Surgical History:   Procedure Laterality Date    IMPLANT BREAST SILICONE/EQ Bilateral 6152    subglandular     Allergies   Allergen Reactions    Pcn [Penicillins] Hives    Sulfa (Sulfonamide Antibiotics) Hives       REVIEW OF SYSTEMS:  General: negative for - chills or fever, or weight loss or gain  ENT: negative for - headaches, nasal congestion or tinnitus  Eyes: no blurred or visual changes  Neck: No stiffness or swollen nodes  Respiratory: negative for - cough, hemoptysis, shortness of breath or wheezing  Cardiovascular : negative for - chest pain, edema, palpitations or shortness of breath  Gastrointestinal: negative for - abdominal pain, blood in stools, heartburn or nausea/vomiting  Genito-Urinary: no dysuria, trouble voiding, or hematuria  Musculoskeletal: negative for - gait disturbance, joint pain, joint stiffness or joint swelling  Neurological: no TIA or stroke symptoms  Hematologic: no bruises, no bleeding  Lymphatic: no swollen glands  Integument: no lumps, mole changes, nail changes or rash  Endocrine:no malaise/lethargy poly uria or polydipsia or unexpected weight changes        Social History     Socioeconomic History    Marital status:    Tobacco Use    Smoking status: Every Day     Packs/day: 0.50     Years: 30.00     Pack years: 15.00     Types: Cigarettes    Smokeless tobacco: Never   Vaping Use    Vaping Use: Never used   Substance and Sexual Activity    Alcohol use:  Yes     Alcohol/week: 12.0 standard drinks     Types: 12 Cans of beer per week    Drug use: Yes     Types: Prescription     Social Determinants of Health     Physical Activity: Sufficiently Active    Days of Exercise per Week: 5 days    Minutes of Exercise per Session: 60 min     Family History   Problem Relation Age of Onset    Cancer Mother         skin, throat OSTEOARTHRITIS Mother     Anxiety Mother     No Known Problems Father        OBJECTIVE:     Visit Vitals  /66   Pulse 76   Temp 98.4 °F (36.9 °C)   Ht 5' 4.5\" (1.638 m)   Wt 127 lb 3.2 oz (57.7 kg)   SpO2 98%   BMI 21.50 kg/m²     CONSTITUTIONAL:   well nourished, appears age appropriate  EYES: sclera anicteric, PERRL, EOMI  ENMT:nares inflamed and edematous with some purulence noted, moist mucous membranes, pharynx clear  NECK: supple. Thyroid normal, No JVD or bruits  RESPIRATORY: Chest: clear to ascultation and percussion, normal inspiratory effort  CARDIOVASCULAR: Heart: regular rate and rhythm no murmurs, rubs or gallops, PMI not displaced, No thrills, no peripheral edema  GASTROINTESTINAL: Abdomen: non distended, soft, non tender, bowel sounds normal  HEMATOLOGIC: no purpura, petechiae or bruising  LYMPHATIC: No lymph node enlargemant  MUSCULOSKELETAL: Extremities: no active synovitis, pulse 1+   INTEGUMENT: No unusual rashes or suspicious skin lesions noted. Nails appear normal.  PERIPHERAL VASCULAR: normal pulses femoral, PT and DP  NEUROLOGIC: non-focal exam, A & O X 3  PSYCHIATRIC:, appropriate affect     ASSESSMENT:   1. Mild intermittent asthma without complication    2. Mixed hyperlipidemia    3. GERD without esophagitis    4. Cigarette nicotine dependence without complication    5. Severe anxiety with panic    6. Acute non-recurrent maxillary sinusitis      Impression  1. Asthma that is stable  2. Hyperlipidemia prior lab reviewed repeat status pending  3. GERD that is stable   4. Tobacco abuse in the form of smoking which I encouraged her to stop smoking. We did spend 5 minutes discussing complications of continued smoking including development of COPD as well as increased cardiovascular risk and increased risk of lung cancer and other cancers. And we have discussed ways to stop including use of Chantix, Wellbutrin, nicotine gum and nicotine patches.   I discussed the fact that both her and her  smoked and they could try. And together. 5.  Anxiety with panic attacks seems stable continue as needed Xanax  6. Acute sinusitis we will treat this with Zithromax  Follow-up with me again 3 months or sooner if there is a problem. I will call with today's lab results. PLAN:  .  Orders Placed This Encounter    METABOLIC PANEL, COMPREHENSIVE    LIPID PANEL    CK    azithromycin (ZITHROMAX) 250 mg tablet         ATTENTION:   This medical record was transcribed using an electronic medical records system. Although proofread, it may and can contain electronic and spelling errors. Other human spelling and other errors may be present. Corrections may be executed at a later time. Please feel free to contact us for any clarifications as needed. Follow-up and Dispositions    Return in about 3 months (around 5/17/2023). No results found for any visits on 02/17/23. Nicole Dawson MD    The patient verbalized understanding of the problems and plans as explained.

## 2023-02-17 NOTE — PROGRESS NOTES
Chief Complaint   Patient presents with    Establish Care    Cholesterol Problem           Anxiety    Nicotine Dependence      1. Have you been to the ER, urgent care clinic since your last visit? Hospitalized since your last visit? No    2. Have you seen or consulted any other health care providers outside of the [address] [place] [address] since your last visit? Include any pap smears or colon screening.  No

## 2023-02-18 LAB
ALBUMIN SERPL-MCNC: 4 G/DL (ref 3.5–5)
ALBUMIN/GLOB SERPL: 1.1 (ref 1.1–2.2)
ALP SERPL-CCNC: 69 U/L (ref 45–117)
ALT SERPL-CCNC: 22 U/L (ref 12–78)
ANION GAP SERPL CALC-SCNC: 5 MMOL/L (ref 5–15)
AST SERPL-CCNC: 22 U/L (ref 15–37)
BILIRUB SERPL-MCNC: 0.4 MG/DL (ref 0.2–1)
BUN SERPL-MCNC: 8 MG/DL (ref 6–20)
BUN/CREAT SERPL: 13 (ref 12–20)
CALCIUM SERPL-MCNC: 9.7 MG/DL (ref 8.5–10.1)
CHLORIDE SERPL-SCNC: 107 MMOL/L (ref 97–108)
CHOLEST SERPL-MCNC: 219 MG/DL
CK SERPL-CCNC: 107 U/L (ref 26–192)
CO2 SERPL-SCNC: 27 MMOL/L (ref 21–32)
CREAT SERPL-MCNC: 0.63 MG/DL (ref 0.55–1.02)
GLOBULIN SER CALC-MCNC: 3.6 G/DL (ref 2–4)
GLUCOSE SERPL-MCNC: 99 MG/DL (ref 65–100)
HDLC SERPL-MCNC: 66 MG/DL
HDLC SERPL: 3.3 (ref 0–5)
LDLC SERPL CALC-MCNC: 132.2 MG/DL (ref 0–100)
POTASSIUM SERPL-SCNC: 4.5 MMOL/L (ref 3.5–5.1)
PROT SERPL-MCNC: 7.6 G/DL (ref 6.4–8.2)
SODIUM SERPL-SCNC: 139 MMOL/L (ref 136–145)
TRIGL SERPL-MCNC: 104 MG/DL (ref ?–150)
VLDLC SERPL CALC-MCNC: 20.8 MG/DL

## 2023-02-21 DIAGNOSIS — E78.00 HYPERCHOLESTEROLEMIA: Chronic | ICD-10-CM

## 2023-02-21 RX ORDER — PRAVASTATIN SODIUM 40 MG/1
TABLET ORAL
Qty: 90 TABLET | Refills: 3 | Status: SHIPPED | OUTPATIENT
Start: 2023-02-21

## 2023-02-21 NOTE — TELEPHONE ENCOUNTER
RX refill request from the patient/pharmacy. Patient last seen 02- with labs, and next appt. scheduled for 06-  Requested Prescriptions     Pending Prescriptions Disp Refills    pravastatin (PRAVACHOL) 40 mg tablet 90 Tablet 3     Sig: TAKE 1 TABLET EVERY DAY FOR CHOLESTEROL    .

## 2023-04-24 DIAGNOSIS — F41.0 SEVERE ANXIETY WITH PANIC: Chronic | ICD-10-CM

## 2023-04-24 RX ORDER — ALPRAZOLAM 0.5 MG/1
TABLET ORAL
Qty: 90 TABLET | Refills: 0 | Status: SHIPPED | OUTPATIENT
Start: 2023-04-24

## 2023-05-16 ENCOUNTER — TRANSCRIBE ORDERS (OUTPATIENT)
Facility: HOSPITAL | Age: 58
End: 2023-05-16

## 2023-05-16 DIAGNOSIS — Z12.31 SCREENING MAMMOGRAM FOR HIGH-RISK PATIENT: Primary | ICD-10-CM

## 2023-05-30 SDOH — ECONOMIC STABILITY: TRANSPORTATION INSECURITY
IN THE PAST 12 MONTHS, HAS LACK OF TRANSPORTATION KEPT YOU FROM MEETINGS, WORK, OR FROM GETTING THINGS NEEDED FOR DAILY LIVING?: YES

## 2023-05-30 SDOH — ECONOMIC STABILITY: HOUSING INSECURITY
IN THE LAST 12 MONTHS, WAS THERE A TIME WHEN YOU DID NOT HAVE A STEADY PLACE TO SLEEP OR SLEPT IN A SHELTER (INCLUDING NOW)?: NO

## 2023-05-30 SDOH — ECONOMIC STABILITY: FOOD INSECURITY: WITHIN THE PAST 12 MONTHS, THE FOOD YOU BOUGHT JUST DIDN'T LAST AND YOU DIDN'T HAVE MONEY TO GET MORE.: PATIENT DECLINED

## 2023-05-30 SDOH — ECONOMIC STABILITY: INCOME INSECURITY: HOW HARD IS IT FOR YOU TO PAY FOR THE VERY BASICS LIKE FOOD, HOUSING, MEDICAL CARE, AND HEATING?: PATIENT DECLINED

## 2023-05-30 SDOH — ECONOMIC STABILITY: FOOD INSECURITY: WITHIN THE PAST 12 MONTHS, YOU WORRIED THAT YOUR FOOD WOULD RUN OUT BEFORE YOU GOT MONEY TO BUY MORE.: PATIENT DECLINED

## 2023-05-31 PROBLEM — F17.210 CIGARETTE NICOTINE DEPENDENCE WITHOUT COMPLICATION: Status: RESOLVED | Noted: 2017-08-10 | Resolved: 2023-05-31

## 2023-05-31 PROBLEM — M79.7 FIBROMYALGIA: Status: ACTIVE | Noted: 2017-08-10

## 2023-05-31 PROBLEM — Z00.00 ANNUAL PHYSICAL EXAM: Status: ACTIVE | Noted: 2023-05-31

## 2023-05-31 PROBLEM — Z72.0 TOBACCO ABUSE: Status: ACTIVE | Noted: 2023-05-31

## 2023-05-31 PROBLEM — E78.2 MIXED HYPERLIPIDEMIA: Status: ACTIVE | Noted: 2023-02-16

## 2023-05-31 PROBLEM — F41.0 SEVERE ANXIETY WITH PANIC: Status: ACTIVE | Noted: 2017-08-10

## 2023-05-31 NOTE — PROGRESS NOTES
Gastroesophageal Reflux (6 month follow up)       HPI:     Alyssa Cardoso is a 63y.o. year old female who is here for her annual comprehensive healthcare exam and follow-up of her medical problems include hyperlipidemia, asthma, GERD, DJD, anxiety and unfortunate continued tobacco abuse in the form of smoking which she says she is cut back on. She does still smoke on a daily basis. She currently denies any chest pain, shortness of breath, palpitations, PND, orthopnea or other cardiac respiratory complaints. She has no GI or  complaints. She has no headaches, dizziness or neurologic complaints. She does have a lot of discomfort in her right hip to the point where she cannot sleep on it because it bothers her. She has had no falls or trauma. She has no other complaints on complete review of systems other than stress which she seems to be doing okay by managing getting outside when she has increased stress. /82 (Site: Left Upper Arm, Position: Sitting, Cuff Size: Large Adult)   Pulse 76   Temp 98.2 °F (36.8 °C) (Oral)   Resp 18   Ht 5' 4.5\" (1.638 m)   Wt 128 lb 6.4 oz (58.2 kg)   SpO2 98%   BMI 21.70 kg/m²     Past Medical History:   Diagnosis Date    Acne vulgaris 8/10/2017    Cigarette nicotine dependence without complication 0/75/8228    Fibromyalgia 8/10/2017    GERD without esophagitis 8/10/2017    History of tonsillitis 08/10/2017    Lingual tonsillitis [J03.90]    Hypercholesterolemia 8/10/2017    Long-term current use of benzodiazepine     Menopause 8/10/2017    Mild intermittent asthma without complication 6/04/0649    Severe anxiety with panic 8/10/2017       Past Surgical History:   Procedure Laterality Date    IMPLANT BREAST SILICONE/EQ Bilateral 6700    subglandular       Prior to Admission medications    Medication Sig Start Date End Date Taking?  Authorizing Provider   albuterol sulfate HFA (PROVENTIL;VENTOLIN;PROAIR) 108 (90 Base) MCG/ACT inhaler USE 2 PUFFS EVERY

## 2023-06-01 ENCOUNTER — OFFICE VISIT (OUTPATIENT)
Facility: CLINIC | Age: 58
End: 2023-06-01
Payer: COMMERCIAL

## 2023-06-01 VITALS
WEIGHT: 128.4 LBS | HEART RATE: 76 BPM | DIASTOLIC BLOOD PRESSURE: 82 MMHG | RESPIRATION RATE: 18 BRPM | HEIGHT: 65 IN | OXYGEN SATURATION: 98 % | SYSTOLIC BLOOD PRESSURE: 128 MMHG | BODY MASS INDEX: 21.39 KG/M2 | TEMPERATURE: 98.2 F

## 2023-06-01 DIAGNOSIS — Z72.0 TOBACCO ABUSE: ICD-10-CM

## 2023-06-01 DIAGNOSIS — J45.20 MILD INTERMITTENT ASTHMA WITHOUT COMPLICATION: ICD-10-CM

## 2023-06-01 DIAGNOSIS — M16.11 PRIMARY OSTEOARTHRITIS OF RIGHT HIP: ICD-10-CM

## 2023-06-01 DIAGNOSIS — E78.2 MIXED HYPERLIPIDEMIA: ICD-10-CM

## 2023-06-01 DIAGNOSIS — K21.9 GERD WITHOUT ESOPHAGITIS: ICD-10-CM

## 2023-06-01 DIAGNOSIS — F41.0 SEVERE ANXIETY WITH PANIC: ICD-10-CM

## 2023-06-01 DIAGNOSIS — Z00.00 ANNUAL PHYSICAL EXAM: Primary | ICD-10-CM

## 2023-06-01 PROCEDURE — 99406 BEHAV CHNG SMOKING 3-10 MIN: CPT | Performed by: INTERNAL MEDICINE

## 2023-06-01 PROCEDURE — 99396 PREV VISIT EST AGE 40-64: CPT | Performed by: INTERNAL MEDICINE

## 2023-06-01 PROCEDURE — 20610 DRAIN/INJ JOINT/BURSA W/O US: CPT | Performed by: INTERNAL MEDICINE

## 2023-06-01 RX ORDER — METHYLPREDNISOLONE ACETATE 40 MG/ML
40 INJECTION, SUSPENSION INTRA-ARTICULAR; INTRALESIONAL; INTRAMUSCULAR; SOFT TISSUE ONCE
Status: COMPLETED | OUTPATIENT
Start: 2023-06-01 | End: 2023-06-01

## 2023-06-01 RX ADMIN — METHYLPREDNISOLONE ACETATE 40 MG: 40 INJECTION, SUSPENSION INTRA-ARTICULAR; INTRALESIONAL; INTRAMUSCULAR; SOFT TISSUE at 10:08

## 2023-06-01 SDOH — ECONOMIC STABILITY: FOOD INSECURITY: WITHIN THE PAST 12 MONTHS, THE FOOD YOU BOUGHT JUST DIDN'T LAST AND YOU DIDN'T HAVE MONEY TO GET MORE.: NEVER TRUE

## 2023-06-01 SDOH — ECONOMIC STABILITY: FOOD INSECURITY: WITHIN THE PAST 12 MONTHS, YOU WORRIED THAT YOUR FOOD WOULD RUN OUT BEFORE YOU GOT MONEY TO BUY MORE.: NEVER TRUE

## 2023-06-01 SDOH — ECONOMIC STABILITY: INCOME INSECURITY: HOW HARD IS IT FOR YOU TO PAY FOR THE VERY BASICS LIKE FOOD, HOUSING, MEDICAL CARE, AND HEATING?: NOT HARD AT ALL

## 2023-06-01 NOTE — PROGRESS NOTES
Kimberly Crocker is a 63 y.o. female     Chief Complaint   Patient presents with    Gastroesophageal Reflux     6 month follow up       /82 (Site: Left Upper Arm, Position: Sitting, Cuff Size: Large Adult)   Pulse 76   Temp 98.2 °F (36.8 °C) (Oral)   Resp 18   Ht 5' 4.5\" (1.638 m)   Wt 128 lb 6.4 oz (58.2 kg)   SpO2 98%   BMI 21.70 kg/m²     Health Maintenance Due   Topic Date Due    Pneumococcal 0-64 years Vaccine (1 - PCV) Never done    HIV screen  Never done    DTaP/Tdap/Td vaccine (1 - Tdap) Never done    Cervical cancer screen  Never done    Shingles vaccine (1 of 2) Never done    COVID-19 Vaccine (3 - Booster for Pfizer series) 11/18/2021         1. \"Have you been to the ER, urgent care clinic since your last visit? Hospitalized since your last visit? \" No    2. \"Have you seen or consulted any other health care providers outside of the [address] [place] [address] since your last visit? \" No     3. For patients aged 39-70: Has the patient had a colonoscopy / FIT/ Cologuard? Yes      If the patient is female:    4. For patients aged 41-77: Has the patient had a mammogram within the past 2 years? Have it scheduled for June 9th 2023.      5. For patients aged 21-65: Has the patient had a pap smear?  Yes

## 2023-06-02 LAB
ALBUMIN SERPL-MCNC: 4.2 G/DL (ref 3.5–5)
ALBUMIN/GLOB SERPL: 1.1 (ref 1.1–2.2)
ALP SERPL-CCNC: 72 U/L (ref 45–117)
ALT SERPL-CCNC: 26 U/L (ref 12–78)
ANION GAP SERPL CALC-SCNC: 7 MMOL/L (ref 5–15)
APPEARANCE UR: CLEAR
AST SERPL-CCNC: 25 U/L (ref 15–37)
BACTERIA URNS QL MICRO: ABNORMAL /HPF
BASOPHILS # BLD: 0.1 K/UL (ref 0–0.1)
BASOPHILS NFR BLD: 1 % (ref 0–1)
BILIRUB SERPL-MCNC: 0.4 MG/DL (ref 0.2–1)
BILIRUB UR QL: NEGATIVE
BUN SERPL-MCNC: 13 MG/DL (ref 6–20)
BUN/CREAT SERPL: 19 (ref 12–20)
CALCIUM SERPL-MCNC: 9.6 MG/DL (ref 8.5–10.1)
CHLORIDE SERPL-SCNC: 105 MMOL/L (ref 97–108)
CHOLEST SERPL-MCNC: 252 MG/DL
CK SERPL-CCNC: 115 U/L (ref 26–192)
CO2 SERPL-SCNC: 27 MMOL/L (ref 21–32)
COLOR UR: ABNORMAL
CREAT SERPL-MCNC: 0.67 MG/DL (ref 0.55–1.02)
DIFFERENTIAL METHOD BLD: ABNORMAL
EOSINOPHIL # BLD: 0.1 K/UL (ref 0–0.4)
EOSINOPHIL NFR BLD: 2 % (ref 0–7)
EPITH CASTS URNS QL MICRO: ABNORMAL /LPF
ERYTHROCYTE [DISTWIDTH] IN BLOOD BY AUTOMATED COUNT: 12.7 % (ref 11.5–14.5)
GLOBULIN SER CALC-MCNC: 3.8 G/DL (ref 2–4)
GLUCOSE SERPL-MCNC: 96 MG/DL (ref 65–100)
GLUCOSE UR STRIP.AUTO-MCNC: NEGATIVE MG/DL
HCT VFR BLD AUTO: 48.8 % (ref 35–47)
HDLC SERPL-MCNC: 73 MG/DL
HDLC SERPL: 3.5 (ref 0–5)
HGB BLD-MCNC: 15.7 G/DL (ref 11.5–16)
HGB UR QL STRIP: NEGATIVE
HYALINE CASTS URNS QL MICRO: ABNORMAL /LPF (ref 0–5)
IMM GRANULOCYTES # BLD AUTO: 0 K/UL (ref 0–0.04)
IMM GRANULOCYTES NFR BLD AUTO: 0 % (ref 0–0.5)
KETONES UR QL STRIP.AUTO: NEGATIVE MG/DL
LDLC SERPL CALC-MCNC: 163.6 MG/DL (ref 0–100)
LEUKOCYTE ESTERASE UR QL STRIP.AUTO: NEGATIVE
LYMPHOCYTES # BLD: 1.7 K/UL (ref 0.8–3.5)
LYMPHOCYTES NFR BLD: 24 % (ref 12–49)
MCH RBC QN AUTO: 31.3 PG (ref 26–34)
MCHC RBC AUTO-ENTMCNC: 32.2 G/DL (ref 30–36.5)
MCV RBC AUTO: 97.2 FL (ref 80–99)
MONOCYTES # BLD: 0.6 K/UL (ref 0–1)
MONOCYTES NFR BLD: 8 % (ref 5–13)
NEUTS SEG # BLD: 4.5 K/UL (ref 1.8–8)
NEUTS SEG NFR BLD: 65 % (ref 32–75)
NITRITE UR QL STRIP.AUTO: NEGATIVE
NRBC # BLD: 0 K/UL (ref 0–0.01)
NRBC BLD-RTO: 0 PER 100 WBC
PH UR STRIP: 6 (ref 5–8)
PLATELET # BLD AUTO: 226 K/UL (ref 150–400)
PMV BLD AUTO: 11.2 FL (ref 8.9–12.9)
POTASSIUM SERPL-SCNC: 4.7 MMOL/L (ref 3.5–5.1)
PROT SERPL-MCNC: 8 G/DL (ref 6.4–8.2)
PROT UR STRIP-MCNC: NEGATIVE MG/DL
RBC # BLD AUTO: 5.02 M/UL (ref 3.8–5.2)
RBC #/AREA URNS HPF: ABNORMAL /HPF (ref 0–5)
SODIUM SERPL-SCNC: 139 MMOL/L (ref 136–145)
SP GR UR REFRACTOMETRY: 1.01 (ref 1–1.03)
T4 FREE SERPL-MCNC: 1 NG/DL (ref 0.8–1.5)
TRIGL SERPL-MCNC: 77 MG/DL
TSH SERPL DL<=0.05 MIU/L-ACNC: 0.99 UIU/ML (ref 0.36–3.74)
UROBILINOGEN UR QL STRIP.AUTO: 0.2 EU/DL (ref 0.2–1)
VLDLC SERPL CALC-MCNC: 15.4 MG/DL
WBC # BLD AUTO: 7 K/UL (ref 3.6–11)
WBC URNS QL MICRO: ABNORMAL /HPF (ref 0–4)

## 2023-06-05 DIAGNOSIS — F41.9 ANXIETY: Primary | ICD-10-CM

## 2023-06-05 RX ORDER — PRAVASTATIN SODIUM 40 MG
TABLET ORAL
Qty: 90 TABLET | Refills: 3 | Status: SHIPPED | OUTPATIENT
Start: 2023-06-05

## 2023-06-05 RX ORDER — ALPRAZOLAM 0.5 MG/1
TABLET ORAL
Qty: 90 TABLET | Refills: 0 | Status: SHIPPED | OUTPATIENT
Start: 2023-06-05 | End: 2023-07-03

## 2023-06-05 NOTE — TELEPHONE ENCOUNTER
RX refill request from the patient/pharmacy. Patient last seen 06- with labs, and next appt. scheduled for 12-  Requested Prescriptions     Pending Prescriptions Disp Refills    ALPRAZolam (XANAX) 0.5 MG tablet 90 tablet 0     Sig: TAKE 1 TABLET THREE TIMES DAILY IF NEEDED FOR ANXIETY    pravastatin (PRAVACHOL) 40 MG tablet 90 tablet 3     Sig: TAKE 1 TABLET EVERY DAY FOR CHOLESTEROL    .

## 2023-06-30 PROBLEM — Z00.00 ANNUAL PHYSICAL EXAM: Status: RESOLVED | Noted: 2023-05-31 | Resolved: 2023-06-30

## 2023-07-12 DIAGNOSIS — F41.9 ANXIETY: ICD-10-CM

## 2023-07-12 RX ORDER — ALPRAZOLAM 0.5 MG/1
TABLET ORAL
Qty: 90 TABLET | Refills: 0 | Status: SHIPPED | OUTPATIENT
Start: 2023-07-12 | End: 2023-08-13

## 2023-07-12 NOTE — TELEPHONE ENCOUNTER
RX refill request from the patient/pharmacy. Patient last seen 06- with labs, and next appt. scheduled for 12-  Requested Prescriptions     Pending Prescriptions Disp Refills    ALPRAZolam (XANAX) 0.5 MG tablet [Pharmacy Med Name: ALPRAZOLAM 0.5MG] 90 tablet 0     Sig: TAKE 1 TABLET THREE TIMES DAILY IF NEEDED FOR ANXIETY    .

## 2023-07-18 ENCOUNTER — HOSPITAL ENCOUNTER (OUTPATIENT)
Facility: HOSPITAL | Age: 58
Discharge: HOME | End: 2023-07-21
Payer: COMMERCIAL

## 2023-07-18 DIAGNOSIS — Z12.31 SCREENING MAMMOGRAM FOR HIGH-RISK PATIENT: ICD-10-CM

## 2023-07-18 PROCEDURE — 77063 BREAST TOMOSYNTHESIS BI: CPT

## 2023-07-19 RX ORDER — ALBUTEROL SULFATE 90 UG/1
AEROSOL, METERED RESPIRATORY (INHALATION)
Qty: 8.5 G | Refills: 5 | Status: SHIPPED | OUTPATIENT
Start: 2023-07-19

## 2023-07-19 RX ORDER — DOXYCYCLINE 100 MG/1
CAPSULE ORAL
Qty: 30 CAPSULE | Refills: 5 | Status: SHIPPED | OUTPATIENT
Start: 2023-07-19

## 2023-07-19 NOTE — TELEPHONE ENCOUNTER
RX refill request from the patient/pharmacy. Patient last seen 06- with labs, and next appt. scheduled for 12-  Requested Prescriptions     Pending Prescriptions Disp Refills    doxycycline monohydrate (MONODOX) 100 MG capsule [Pharmacy Med Name: DOXYCYCLINE MONO  100MG] 30 capsule 5     Sig: TAKE 1 CAPSULE BY MOUTH DAILY FOR 30 DAYS. INDICATIONS: ACNE    .

## 2023-07-19 NOTE — TELEPHONE ENCOUNTER
RX refill request from the patient/pharmacy. Patient last seen 06- with labs, and next appt. scheduled for 12-  Requested Prescriptions     Pending Prescriptions Disp Refills    albuterol sulfate HFA (PROVENTIL;VENTOLIN;PROAIR) 108 (90 Base) MCG/ACT inhaler [Pharmacy Med Name: ALBUTEROL HFA] 8.5 g 5     Sig: USE 2 PUFFS EVERY FOUR HOURS IF NEEDED FOR SHORTNESS OF BREATH OR WHEEZING    .

## 2023-08-09 ENCOUNTER — OFFICE VISIT (OUTPATIENT)
Facility: CLINIC | Age: 58
End: 2023-08-09
Payer: COMMERCIAL

## 2023-08-09 VITALS
BODY MASS INDEX: 21.24 KG/M2 | OXYGEN SATURATION: 97 % | HEART RATE: 78 BPM | SYSTOLIC BLOOD PRESSURE: 111 MMHG | RESPIRATION RATE: 18 BRPM | TEMPERATURE: 98 F | HEIGHT: 65 IN | DIASTOLIC BLOOD PRESSURE: 75 MMHG | WEIGHT: 127.5 LBS

## 2023-08-09 DIAGNOSIS — K21.9 GERD WITHOUT ESOPHAGITIS: ICD-10-CM

## 2023-08-09 DIAGNOSIS — F41.9 ANXIETY: ICD-10-CM

## 2023-08-09 DIAGNOSIS — F41.0 SEVERE ANXIETY WITH PANIC: ICD-10-CM

## 2023-08-09 DIAGNOSIS — H83.03 LABYRINTHITIS OF BOTH EARS: ICD-10-CM

## 2023-08-09 DIAGNOSIS — E78.2 MIXED HYPERLIPIDEMIA: Primary | ICD-10-CM

## 2023-08-09 DIAGNOSIS — J45.20 MILD INTERMITTENT ASTHMA WITHOUT COMPLICATION: ICD-10-CM

## 2023-08-09 PROCEDURE — 99214 OFFICE O/P EST MOD 30 MIN: CPT | Performed by: INTERNAL MEDICINE

## 2023-08-09 RX ORDER — MECLIZINE HCL 12.5 MG/1
12.5 TABLET ORAL 3 TIMES DAILY
Qty: 30 TABLET | Refills: 0 | Status: SHIPPED | OUTPATIENT
Start: 2023-08-09 | End: 2023-08-19

## 2023-08-09 RX ORDER — ALPRAZOLAM 0.5 MG/1
TABLET ORAL
Qty: 90 TABLET | Refills: 0 | Status: SHIPPED | OUTPATIENT
Start: 2023-08-09 | End: 2023-09-08

## 2023-08-09 RX ORDER — NICOTINE 21 MG/24HR
1 PATCH, TRANSDERMAL 24 HOURS TRANSDERMAL EVERY 24 HOURS
Qty: 30 PATCH | Refills: 3 | Status: SHIPPED | OUTPATIENT
Start: 2023-08-09 | End: 2024-08-08

## 2023-08-09 NOTE — TELEPHONE ENCOUNTER
PCP: Ilya Rapp MD    Last appt: 2023  Future Appointments   Date Time Provider [address]   2023  8:00 AM Fatmata Wiley MD [facility]       Requested Prescriptions     Pending Prescriptions Disp Refills    ALPRAZolam (XANAX) 0.5 MG tablet [Pharmacy Med Name: ALPRAZOLAM 0.5MG] 90 tablet 0     Sig: TAKE 1 TABLET THREE TIMES DAILY IF NEEDED FOR ANXIETY       Prior labs and Blood pressures:  BP Readings from Last 3 Encounters:   23 128/82   23 108/66   22 120/84     Lab Results   Component Value Date/Time     2023 10:22 AM    K 4.7 2023 10:22 AM     2023 10:22 AM    CO2 27 2023 10:22 AM    BUN 13 2023 10:22 AM    GFRAA >60 2021 02:00 PM     No results found for: HBA1C, FBN4VSRQ  Lab Results   Component Value Date/Time    CHOL 252 2023 10:22 AM    HDL 73 2023 10:22 AM    VLDL 27 12/10/2020 10:33 AM     No results found for: VITD3, VD3RIA        Lab Results   Component Value Date/Time    TSH 1.27 2021 02:00 PM

## 2023-08-10 LAB
ALBUMIN SERPL-MCNC: 4.1 G/DL (ref 3.5–5)
ALBUMIN/GLOB SERPL: 1.2 (ref 1.1–2.2)
ALP SERPL-CCNC: 72 U/L (ref 45–117)
ALT SERPL-CCNC: 23 U/L (ref 12–78)
ANION GAP SERPL CALC-SCNC: 5 MMOL/L (ref 5–15)
AST SERPL-CCNC: 20 U/L (ref 15–37)
BILIRUB SERPL-MCNC: 0.4 MG/DL (ref 0.2–1)
BUN SERPL-MCNC: 9 MG/DL (ref 6–20)
BUN/CREAT SERPL: 14 (ref 12–20)
CALCIUM SERPL-MCNC: 9.5 MG/DL (ref 8.5–10.1)
CHLORIDE SERPL-SCNC: 107 MMOL/L (ref 97–108)
CHOLEST SERPL-MCNC: 205 MG/DL
CK SERPL-CCNC: 108 U/L (ref 26–192)
CO2 SERPL-SCNC: 27 MMOL/L (ref 21–32)
CREAT SERPL-MCNC: 0.64 MG/DL (ref 0.55–1.02)
GLOBULIN SER CALC-MCNC: 3.4 G/DL (ref 2–4)
GLUCOSE SERPL-MCNC: 90 MG/DL (ref 65–100)
HDLC SERPL-MCNC: 65 MG/DL
HDLC SERPL: 3.2 (ref 0–5)
LDLC SERPL CALC-MCNC: 120 MG/DL (ref 0–100)
POTASSIUM SERPL-SCNC: 4.8 MMOL/L (ref 3.5–5.1)
PROT SERPL-MCNC: 7.5 G/DL (ref 6.4–8.2)
SODIUM SERPL-SCNC: 139 MMOL/L (ref 136–145)
TRIGL SERPL-MCNC: 100 MG/DL
VLDLC SERPL CALC-MCNC: 20 MG/DL

## 2023-08-15 RX ORDER — AZITHROMYCIN 250 MG/1
250 TABLET, FILM COATED ORAL SEE ADMIN INSTRUCTIONS
Qty: 6 TABLET | Refills: 0 | Status: SHIPPED | OUTPATIENT
Start: 2023-08-15 | End: 2023-08-20

## 2023-08-15 NOTE — TELEPHONE ENCOUNTER
RX refill request from the patient/pharmacy. Patient last seen 08- with labs, and next appt. scheduled for 12-  Requested Prescriptions     Pending Prescriptions Disp Refills    azithromycin (ZITHROMAX) 250 MG tablet 6 tablet 0     Sig: Take 1 tablet by mouth See Admin Instructions for 5 days 500mg on day 1 followed by 250mg on days 2 - 5    .

## 2023-09-13 DIAGNOSIS — F41.9 ANXIETY: ICD-10-CM

## 2023-09-14 RX ORDER — PRAVASTATIN SODIUM 40 MG
TABLET ORAL
Qty: 90 TABLET | Refills: 3 | Status: SHIPPED | OUTPATIENT
Start: 2023-09-14

## 2023-09-14 RX ORDER — ALPRAZOLAM 0.5 MG/1
TABLET ORAL
Qty: 90 TABLET | Refills: 0 | Status: SHIPPED | OUTPATIENT
Start: 2023-09-14 | End: 2023-09-15 | Stop reason: SDUPTHER

## 2023-09-14 NOTE — TELEPHONE ENCOUNTER
Requested Prescriptions     Pending Prescriptions Disp Refills    ALPRAZolam (XANAX) 0.5 MG tablet [Pharmacy Med Name: ALPRAZOLAM 0.5MG] 90 tablet 0     Sig: TAKE 1 TABLET THREE TIMES DAILY IF NEEDED FOR ANXIETY       RX refill request from the patient/pharmacy. Patient last seen 8/9/23 with labs, and next appt. scheduled for 12/1/23.

## 2023-09-14 NOTE — TELEPHONE ENCOUNTER
RX refill request from the patient/pharmacy. Patient last seen 08-09-23 with labs, and next appt. scheduled for 12-01-23.     Requested Prescriptions     Pending Prescriptions Disp Refills    pravastatin (PRAVACHOL) 40 MG tablet 90 tablet 3     Sig: TAKE 1 TABLET EVERY DAY FOR CHOLESTEROL

## 2023-09-15 DIAGNOSIS — F41.9 ANXIETY: ICD-10-CM

## 2023-09-15 RX ORDER — ALPRAZOLAM 0.5 MG/1
TABLET ORAL
Qty: 90 TABLET | Refills: 0 | Status: SHIPPED | OUTPATIENT
Start: 2023-09-15 | End: 2023-10-14

## 2023-09-15 NOTE — TELEPHONE ENCOUNTER
PCP: Maida Palumbo MD    Last appt: 8/9/2023    Future Appointments   Date Time Provider [address]   12/1/2023  8:00 AM Vero Francis MD [facility]       Requested Prescriptions     Pending Prescriptions Disp Refills    ALPRAZolam (XANAX) 0.5 MG tablet 90 tablet 0     Sig: TAKE 1 TABLET THREE TIMES DAILY IF NEEDED FOR ANXIETY  Strength: 0.5 mg

## 2023-10-11 NOTE — TELEPHONE ENCOUNTER
RX refill request from the patient/pharmacy. Patient last seen 08- with labs, and next appt. scheduled for 12-  Requested Prescriptions     Pending Prescriptions Disp Refills    valACYclovir (VALTREX) 1 g tablet [Pharmacy Med Name: *VALACYCLOVIR 1GM] 21 tablet 0     Sig: TAKE 1 TABLET BY MOUTH THREE (3) TIMES DAILY FOR 7 DAYS. Dalton Pineda

## 2023-10-12 RX ORDER — VALACYCLOVIR HYDROCHLORIDE 1 G/1
TABLET, FILM COATED ORAL
Qty: 21 TABLET | Refills: 0 | Status: SHIPPED | OUTPATIENT
Start: 2023-10-12

## 2023-12-10 DIAGNOSIS — F41.9 ANXIETY: Primary | ICD-10-CM

## 2023-12-11 RX ORDER — ALPRAZOLAM 0.5 MG/1
TABLET ORAL
Qty: 90 TABLET | Refills: 0 | Status: SHIPPED | OUTPATIENT
Start: 2023-12-11 | End: 2024-01-10

## 2023-12-11 NOTE — TELEPHONE ENCOUNTER
RX refill request from the patient/pharmacy. Patient last seen 08- with labs, and next appt. scheduled for 01-  Requested Prescriptions     Pending Prescriptions Disp Refills    ALPRAZolam (XANAX) 0.5 MG tablet [Pharmacy Med Name: ALPRAZOLAM 0.5MG] 90 tablet 0     Sig: TAKE 1 TABLET THREE TIMES DAILY IF NEEDED FOR ANXIETY    .

## 2024-01-21 DIAGNOSIS — F41.9 ANXIETY: ICD-10-CM

## 2024-01-22 RX ORDER — ALPRAZOLAM 0.5 MG/1
TABLET ORAL
Qty: 90 TABLET | Refills: 0 | Status: SHIPPED | OUTPATIENT
Start: 2024-01-22 | End: 2024-02-21

## 2024-01-22 NOTE — TELEPHONE ENCOUNTER
PCP: Curtis Banerjee MD    Last appt: 8/9/2023    Future Appointments   Date Time Provider Department Center   1/26/2024  9:40 AM Curtis Banerjee MD [facility]       Requested Prescriptions     Pending Prescriptions Disp Refills    ALPRAZolam (XANAX) 0.5 MG tablet [Pharmacy Med Name: ALPRAZOLAM 0.5MG] 90 tablet 0     Sig: TAKE 1 TABLET THREE TIMES DAILY IF NEEDED FOR ANXIETY

## 2024-01-25 NOTE — PROGRESS NOTES
Chief Complaint   Patient presents with    Follow-up    Cholesterol Problem     3 month folloswup       SUBJECTIVE:    Yuli Kessler is a 58 y.o. female   Who returns in follow-up for medical problems include hyperlipidemia, GERD, DJD, anxiety with severe panic attacks, asthma and unfortunate continued tobacco abuse.  Unfortunately she still smokes on a daily basis.  She has had some head and nasal congestion over the past couple weeks does have some color to it she notes usually a [employer] works for this.  She denies any chest pain, shortness of breath cardiorespiratory complaints otherwise.  There are no GI or  complaints.  She notes no headaches, dizziness or neurologic complaints.  There are no current active arthritic complaints and no other complaints on complete review systems.    Current Outpatient Medications   Medication Sig Dispense Refill    azithromycin (ZITHROMAX) 250 MG tablet Take 1 tablet by mouth See Admin Instructions for 5 days 500mg on day 1 followed by 250mg on days 2 - 5 6 tablet 0    ALPRAZolam (XANAX) 0.5 MG tablet TAKE 1 TABLET THREE TIMES DAILY IF NEEDED FOR ANXIETY 90 tablet 0    valACYclovir (VALTREX) 1 g tablet TAKE 1 TABLET BY MOUTH THREE (3) TIMES DAILY FOR 7 DAYS. 21 tablet 0    pravastatin (PRAVACHOL) 40 MG tablet TAKE 1 TABLET EVERY DAY FOR CHOLESTEROL 90 tablet 3    nicotine (NICODERM CQ) 21 MG/24HR Place 1 patch onto the skin every 24 hours 30 patch 3    doxycycline monohydrate (MONODOX) 100 MG capsule TAKE 1 CAPSULE BY MOUTH DAILY FOR 30 DAYS. INDICATIONS: ACNE 30 capsule 5    albuterol sulfate HFA (PROVENTIL;VENTOLIN;PROAIR) 108 (90 Base) MCG/ACT inhaler USE 2 PUFFS EVERY FOUR HOURS IF NEEDED FOR SHORTNESS OF BREATH OR WHEEZING 8.5 g 5     No current facility-administered medications for this visit.     Past Medical History:   Diagnosis Date    Acne vulgaris 8/10/2017    Cigarette nicotine dependence without complication 8/10/2017    Fibromyalgia 8/10/2017    GERD

## 2024-01-26 ENCOUNTER — OFFICE VISIT (OUTPATIENT)
Facility: CLINIC | Age: 59
End: 2024-01-26
Payer: COMMERCIAL

## 2024-01-26 VITALS
OXYGEN SATURATION: 95 % | HEART RATE: 69 BPM | SYSTOLIC BLOOD PRESSURE: 101 MMHG | WEIGHT: 130.2 LBS | DIASTOLIC BLOOD PRESSURE: 64 MMHG | BODY MASS INDEX: 22 KG/M2 | TEMPERATURE: 98.2 F

## 2024-01-26 DIAGNOSIS — J45.20 MILD INTERMITTENT ASTHMA WITHOUT COMPLICATION: Primary | ICD-10-CM

## 2024-01-26 DIAGNOSIS — F41.0 SEVERE ANXIETY WITH PANIC: ICD-10-CM

## 2024-01-26 DIAGNOSIS — K21.9 GERD WITHOUT ESOPHAGITIS: ICD-10-CM

## 2024-01-26 DIAGNOSIS — J01.00 ACUTE NON-RECURRENT MAXILLARY SINUSITIS: ICD-10-CM

## 2024-01-26 DIAGNOSIS — Z72.0 TOBACCO ABUSE: ICD-10-CM

## 2024-01-26 DIAGNOSIS — E78.2 MIXED HYPERLIPIDEMIA: ICD-10-CM

## 2024-01-26 PROCEDURE — 99214 OFFICE O/P EST MOD 30 MIN: CPT | Performed by: INTERNAL MEDICINE

## 2024-01-26 RX ORDER — AZITHROMYCIN 250 MG/1
250 TABLET, FILM COATED ORAL SEE ADMIN INSTRUCTIONS
Qty: 6 TABLET | Refills: 0 | Status: SHIPPED | OUTPATIENT
Start: 2024-01-26 | End: 2024-01-31

## 2024-01-26 NOTE — PROGRESS NOTES
Chief Complaint   Patient presents with    Follow-up    Cholesterol Problem     3 month folloswup    1. Have you been to the ER, urgent care clinic since your last visit?  Hospitalized since your last visit?no    2. Have you seen or consulted any other health care providers outside of the [facility] since your last visit?  Include any pap smears or colon screening. no

## 2024-01-27 LAB
ALBUMIN SERPL-MCNC: 4.2 G/DL (ref 3.5–5)
ALBUMIN/GLOB SERPL: 1.3 (ref 1.1–2.2)
ALP SERPL-CCNC: 70 U/L (ref 45–117)
ALT SERPL-CCNC: 18 U/L (ref 12–78)
ANION GAP SERPL CALC-SCNC: 8 MMOL/L (ref 5–15)
AST SERPL-CCNC: 16 U/L (ref 15–37)
BILIRUB SERPL-MCNC: 0.6 MG/DL (ref 0.2–1)
BUN SERPL-MCNC: 9 MG/DL (ref 6–20)
BUN/CREAT SERPL: 16 (ref 12–20)
CALCIUM SERPL-MCNC: 9.4 MG/DL (ref 8.5–10.1)
CHLORIDE SERPL-SCNC: 106 MMOL/L (ref 97–108)
CHOLEST SERPL-MCNC: 206 MG/DL
CK SERPL-CCNC: 103 U/L (ref 26–192)
CO2 SERPL-SCNC: 25 MMOL/L (ref 21–32)
CREAT SERPL-MCNC: 0.57 MG/DL (ref 0.55–1.02)
GLOBULIN SER CALC-MCNC: 3.2 G/DL (ref 2–4)
GLUCOSE SERPL-MCNC: 92 MG/DL (ref 65–100)
HDLC SERPL-MCNC: 61 MG/DL
HDLC SERPL: 3.4 (ref 0–5)
LDLC SERPL CALC-MCNC: 123.4 MG/DL (ref 0–100)
POTASSIUM SERPL-SCNC: 4.6 MMOL/L (ref 3.5–5.1)
PROT SERPL-MCNC: 7.4 G/DL (ref 6.4–8.2)
SODIUM SERPL-SCNC: 139 MMOL/L (ref 136–145)
TRIGL SERPL-MCNC: 108 MG/DL
VLDLC SERPL CALC-MCNC: 21.6 MG/DL

## 2024-01-31 ENCOUNTER — TELEPHONE (OUTPATIENT)
Facility: CLINIC | Age: 59
End: 2024-01-31

## 2024-02-01 ENCOUNTER — TELEPHONE (OUTPATIENT)
Facility: CLINIC | Age: 59
End: 2024-02-01

## 2024-02-05 NOTE — TELEPHONE ENCOUNTER
Called and left a detailed voice message for callback, [employer] message sent 10:02 AM   Dudley Duvall, UMER 02/05/24

## 2024-02-27 DIAGNOSIS — F41.9 ANXIETY: ICD-10-CM

## 2024-02-28 RX ORDER — ALPRAZOLAM 0.5 MG/1
TABLET ORAL
Qty: 90 TABLET | Refills: 0 | Status: SHIPPED | OUTPATIENT
Start: 2024-02-28 | End: 2024-03-29

## 2024-02-28 NOTE — TELEPHONE ENCOUNTER
PCP: Curtis Banerjee MD    Last appt: 1/26/2024    Future Appointments   Date Time Provider Department Center   7/26/2024  8:30 AM Curtis Banerjee MD [facility]       Requested Prescriptions     Pending Prescriptions Disp Refills    ALPRAZolam (XANAX) 0.5 MG tablet [Pharmacy Med Name: ALPRAZOLAM 0.5MG] 90 tablet 0     Sig: TAKE 1 TABLET THREE TIMES DAILY IF NEEDED FOR ANXIETY

## 2024-04-03 DIAGNOSIS — F41.9 ANXIETY: ICD-10-CM

## 2024-04-03 RX ORDER — ALPRAZOLAM 0.5 MG/1
TABLET ORAL
Qty: 90 TABLET | Refills: 3 | Status: SHIPPED | OUTPATIENT
Start: 2024-04-03 | End: 2024-05-03

## 2024-04-03 NOTE — TELEPHONE ENCOUNTER
PCP: Curtis Banerjee MD    Last appt: 1/26/2024    Future Appointments   Date Time Provider Department Center   7/26/2024  8:30 AM Curtis Banerjee MD [facility]       Requested Prescriptions     Pending Prescriptions Disp Refills    ALPRAZolam (XANAX) 0.5 MG tablet 90 tablet 3     Sig: TAKE 1 TABLET THREE TIMES DAILY IF NEEDED FOR ANXIETY

## 2024-04-22 ENCOUNTER — TRANSCRIBE ORDERS (OUTPATIENT)
Facility: HOSPITAL | Age: 59
End: 2024-04-22

## 2024-04-22 DIAGNOSIS — Z12.31 SCREENING MAMMOGRAM FOR BREAST CANCER: Primary | ICD-10-CM

## 2024-06-07 ENCOUNTER — OFFICE VISIT (OUTPATIENT)
Facility: CLINIC | Age: 59
End: 2024-06-07
Payer: COMMERCIAL

## 2024-06-07 VITALS
HEART RATE: 79 BPM | HEIGHT: 65 IN | DIASTOLIC BLOOD PRESSURE: 72 MMHG | TEMPERATURE: 98.4 F | BODY MASS INDEX: 21.33 KG/M2 | SYSTOLIC BLOOD PRESSURE: 109 MMHG | WEIGHT: 128 LBS | RESPIRATION RATE: 18 BRPM | OXYGEN SATURATION: 97 %

## 2024-06-07 DIAGNOSIS — R53.83 FATIGUE, UNSPECIFIED TYPE: Primary | ICD-10-CM

## 2024-06-07 DIAGNOSIS — M47.812 SPONDYLOSIS OF CERVICAL REGION WITHOUT MYELOPATHY OR RADICULOPATHY: ICD-10-CM

## 2024-06-07 DIAGNOSIS — R42 DIZZINESS: ICD-10-CM

## 2024-06-07 DIAGNOSIS — F41.0 SEVERE ANXIETY WITH PANIC: ICD-10-CM

## 2024-06-07 LAB
ALBUMIN SERPL-MCNC: 3.8 G/DL (ref 3.5–5)
ALBUMIN/GLOB SERPL: 1.2 (ref 1.1–2.2)
ALP SERPL-CCNC: 72 U/L (ref 45–117)
ALT SERPL-CCNC: 22 U/L (ref 12–78)
ANION GAP SERPL CALC-SCNC: 5 MMOL/L (ref 5–15)
BASOPHILS # BLD: 0.1 K/UL (ref 0–0.1)
BASOPHILS NFR BLD: 1 % (ref 0–1)
BILIRUB SERPL-MCNC: 0.5 MG/DL (ref 0.2–1)
BUN SERPL-MCNC: 11 MG/DL (ref 6–20)
BUN/CREAT SERPL: 18 (ref 12–20)
CALCIUM SERPL-MCNC: 9.3 MG/DL (ref 8.5–10.1)
CHLORIDE SERPL-SCNC: 106 MMOL/L (ref 97–108)
CO2 SERPL-SCNC: 28 MMOL/L (ref 21–32)
CREAT SERPL-MCNC: 0.62 MG/DL (ref 0.55–1.02)
EOSINOPHIL # BLD: 0.2 K/UL (ref 0–0.4)
ERYTHROCYTE [DISTWIDTH] IN BLOOD BY AUTOMATED COUNT: 12.7 % (ref 11.5–14.5)
GLOBULIN SER CALC-MCNC: 3.3 G/DL (ref 2–4)
GLUCOSE SERPL-MCNC: 89 MG/DL (ref 65–100)
HCT VFR BLD AUTO: 44.9 % (ref 35–47)
HGB BLD-MCNC: 15.1 G/DL (ref 11.5–16)
IMM GRANULOCYTES # BLD AUTO: 0 K/UL (ref 0–0.04)
IMM GRANULOCYTES NFR BLD AUTO: 0 % (ref 0–0.5)
LYMPHOCYTES # BLD: 1.9 K/UL (ref 0.8–3.5)
LYMPHOCYTES NFR BLD: 30 % (ref 12–49)
MCH RBC QN AUTO: 32.1 PG (ref 26–34)
MCHC RBC AUTO-ENTMCNC: 33.6 G/DL (ref 30–36.5)
MCV RBC AUTO: 95.3 FL (ref 80–99)
MONOCYTES # BLD: 0.6 K/UL (ref 0–1)
MONOCYTES NFR BLD: 9 % (ref 5–13)
NEUTS SEG NFR BLD: 57 % (ref 32–75)
NRBC # BLD: 0 K/UL (ref 0–0.01)
NRBC BLD-RTO: 0 PER 100 WBC
PLATELET # BLD AUTO: 215 K/UL (ref 150–400)
PMV BLD AUTO: 10.9 FL (ref 8.9–12.9)
PROT SERPL-MCNC: 7.1 G/DL (ref 6.4–8.2)
RBC # BLD AUTO: 4.71 M/UL (ref 3.8–5.2)
SODIUM SERPL-SCNC: 139 MMOL/L (ref 136–145)
WBC # BLD AUTO: 6.4 K/UL (ref 3.6–11)

## 2024-06-07 PROCEDURE — 99214 OFFICE O/P EST MOD 30 MIN: CPT | Performed by: INTERNAL MEDICINE

## 2024-06-07 RX ORDER — MELOXICAM 15 MG/1
15 TABLET ORAL DAILY
Qty: 30 TABLET | Refills: 3 | Status: SHIPPED | OUTPATIENT
Start: 2024-06-07

## 2024-06-07 RX ORDER — MECLIZINE HCL 12.5 MG/1
12.5 TABLET ORAL 3 TIMES DAILY
Qty: 30 TABLET | Refills: 0 | Status: SHIPPED | OUTPATIENT
Start: 2024-06-07 | End: 2024-06-17

## 2024-06-07 RX ORDER — ALPRAZOLAM 0.5 MG/1
0.5 TABLET ORAL
COMMUNITY

## 2024-06-07 SDOH — ECONOMIC STABILITY: FOOD INSECURITY: WITHIN THE PAST 12 MONTHS, YOU WORRIED THAT YOUR FOOD WOULD RUN OUT BEFORE YOU GOT MONEY TO BUY MORE.: NEVER TRUE

## 2024-06-07 SDOH — ECONOMIC STABILITY: INCOME INSECURITY: HOW HARD IS IT FOR YOU TO PAY FOR THE VERY BASICS LIKE FOOD, HOUSING, MEDICAL CARE, AND HEATING?: NOT HARD AT ALL

## 2024-06-07 SDOH — ECONOMIC STABILITY: FOOD INSECURITY: WITHIN THE PAST 12 MONTHS, THE FOOD YOU BOUGHT JUST DIDN'T LAST AND YOU DIDN'T HAVE MONEY TO GET MORE.: NEVER TRUE

## 2024-06-07 ASSESSMENT — PATIENT HEALTH QUESTIONNAIRE - PHQ9
10. IF YOU CHECKED OFF ANY PROBLEMS, HOW DIFFICULT HAVE THESE PROBLEMS MADE IT FOR YOU TO DO YOUR WORK, TAKE CARE OF THINGS AT HOME, OR GET ALONG WITH OTHER PEOPLE: EXTREMELY DIFFICULT
4. FEELING TIRED OR HAVING LITTLE ENERGY: NEARLY EVERY DAY
6. FEELING BAD ABOUT YOURSELF - OR THAT YOU ARE A FAILURE OR HAVE LET YOURSELF OR YOUR FAMILY DOWN: NEARLY EVERY DAY
9. THOUGHTS THAT YOU WOULD BE BETTER OFF DEAD, OR OF HURTING YOURSELF: NOT AT ALL
1. LITTLE INTEREST OR PLEASURE IN DOING THINGS: NEARLY EVERY DAY
SUM OF ALL RESPONSES TO PHQ QUESTIONS 1-9: 21
SUM OF ALL RESPONSES TO PHQ QUESTIONS 1-9: 21
SUM OF ALL RESPONSES TO PHQ9 QUESTIONS 1 & 2: 6
3. TROUBLE FALLING OR STAYING ASLEEP: NEARLY EVERY DAY
7. TROUBLE CONCENTRATING ON THINGS, SUCH AS READING THE NEWSPAPER OR WATCHING TELEVISION: NEARLY EVERY DAY
5. POOR APPETITE OR OVEREATING: NOT AT ALL
2. FEELING DOWN, DEPRESSED OR HOPELESS: NEARLY EVERY DAY
8. MOVING OR SPEAKING SO SLOWLY THAT OTHER PEOPLE COULD HAVE NOTICED. OR THE OPPOSITE, BEING SO FIGETY OR RESTLESS THAT YOU HAVE BEEN MOVING AROUND A LOT MORE THAN USUAL: NEARLY EVERY DAY
SUM OF ALL RESPONSES TO PHQ QUESTIONS 1-9: 21
SUM OF ALL RESPONSES TO PHQ QUESTIONS 1-9: 21

## 2024-06-07 ASSESSMENT — COLUMBIA-SUICIDE SEVERITY RATING SCALE - C-SSRS
6. HAVE YOU EVER DONE ANYTHING, STARTED TO DO ANYTHING, OR PREPARED TO DO ANYTHING TO END YOUR LIFE?: NO
1. WITHIN THE PAST MONTH, HAVE YOU WISHED YOU WERE DEAD OR WISHED YOU COULD GO TO SLEEP AND NOT WAKE UP?: NO
2. HAVE YOU ACTUALLY HAD ANY THOUGHTS OF KILLING YOURSELF?: NO

## 2024-06-07 NOTE — PROGRESS NOTES
Yuli Kessler is a 59 y.o. female     Chief Complaint   Patient presents with    Fatigue    Dizziness       /72 (Site: Right Upper Arm, Position: Sitting, Cuff Size: Medium Adult)   Pulse 79   Temp 98.4 °F (36.9 °C) (Temporal)   Resp 18   Ht 1.638 m (5' 4.5\")   Wt 58.1 kg (128 lb)   SpO2 97%   BMI 21.63 kg/m²     Health Maintenance Due   Topic Date Due    Hepatitis B vaccine (1 of 3 - 3-dose series) Never done    Pneumococcal 0-64 years Vaccine (1 of 2 - PCV) Never done    HIV screen  Never done    DTaP/Tdap/Td vaccine (1 - Tdap) Never done    Cervical cancer screen  Never done    Shingles vaccine (1 of 2) Never done    COVID-19 Vaccine (3 - 2023-24 season) 09/01/2023    Depression Screen  02/17/2024         \"Have you been to the ER, urgent care clinic since your last visit?  Hospitalized since your last visit?\"    NO    “Have you seen or consulted any other health care providers outside of [facility] since your last visit?”    NO        “Have you had a pap smear?”    NO    No cervical cancer screening on file                 
playing some role in this  4.  Cervical spondylosis.  She wanted Celebrex but does have a sulfa allergy so I cannot start that I will put on meloxicam 15 mg daily to replace the diclofenac  30 minutes spent in direct care of this patient they were greater than 50% in counseling and coordination of care.  She will notify me if the above is not effective.    Orders Placed This Encounter   Procedures    CBC with Auto Differential     Standing Status:   Future     Standing Expiration Date:   6/7/2025    Comprehensive Metabolic Panel     Standing Status:   Future     Standing Expiration Date:   6/7/2025          No follow-ups on file.     No results found for any visits on 06/07/24.      Curtis Banerjee MD    The patient was given after the visit summary the patient verbalized an understanding of the plans and problems as explained.

## 2024-06-27 RX ORDER — PRAVASTATIN SODIUM 40 MG
TABLET ORAL
Qty: 90 TABLET | Refills: 3 | Status: SHIPPED | OUTPATIENT
Start: 2024-06-27

## 2024-06-27 NOTE — TELEPHONE ENCOUNTER
RX refill request from the patient/pharmacy. Patient last seen 06- with labs, and next appt. scheduled for 07-  Requested Prescriptions     Pending Prescriptions Disp Refills    pravastatin (PRAVACHOL) 40 MG tablet 90 tablet 3     Sig: TAKE 1 TABLET EVERY DAY FOR CHOLESTEROL    .

## 2024-07-31 RX ORDER — PRAVASTATIN SODIUM 40 MG
TABLET ORAL
Qty: 90 TABLET | Refills: 0 | Status: SHIPPED | OUTPATIENT
Start: 2024-07-31

## 2024-07-31 NOTE — TELEPHONE ENCOUNTER
RX refill request from the patient/pharmacy. Patient last seen 06- with labs, and next appt. scheduled for 11-  Requested Prescriptions     Pending Prescriptions Disp Refills    pravastatin (PRAVACHOL) 40 MG tablet 90 tablet 3     Sig: TAKE 1 TABLET EVERY DAY FOR CHOLESTEROL    .

## 2024-10-02 RX ORDER — ALBUTEROL SULFATE 90 UG/1
INHALANT RESPIRATORY (INHALATION)
Qty: 6.7 G | Refills: 5 | Status: SHIPPED | OUTPATIENT
Start: 2024-10-02

## 2024-10-02 RX ORDER — PRAVASTATIN SODIUM 40 MG
TABLET ORAL
Qty: 90 TABLET | Refills: 3 | Status: SHIPPED | OUTPATIENT
Start: 2024-10-02

## 2024-10-02 NOTE — TELEPHONE ENCOUNTER
RX refill request from the patient/pharmacy. Patient last seen 06- with labs, and next appt. scheduled for 11-  Requested Prescriptions     Pending Prescriptions Disp Refills    pravastatin (PRAVACHOL) 40 MG tablet [Pharmacy Med Name: PRAVASTATIN 40MG] 90 tablet 3     Sig: TAKE 1 TABLET EVERY DAY FOR CHOLESTEROL    albuterol sulfate HFA (PROVENTIL;VENTOLIN;PROAIR) 108 (90 Base) MCG/ACT inhaler [Pharmacy Med Name: ALBUTEROL HFA] 6.7 g 5     Sig: USE 2 PUFFS EVERY FOUR HOURS IF NEEDED FOR SHORTNESS OF BREATH OR WHEEZING    .

## 2024-10-14 ENCOUNTER — TELEPHONE (OUTPATIENT)
Facility: CLINIC | Age: 59
End: 2024-10-14

## 2024-10-14 NOTE — TELEPHONE ENCOUNTER
Patient called in and would like to request for Dr. Banerjee to put in a cologuard order for her so that she can get it in the mail.

## 2024-10-16 DIAGNOSIS — Z12.11 COLON CANCER SCREENING: Primary | ICD-10-CM

## 2024-11-06 PROBLEM — E55.9 VITAMIN D DEFICIENCY: Status: ACTIVE | Noted: 2024-11-06

## 2024-11-06 PROBLEM — Z13.39 ALCOHOL SCREENING: Status: ACTIVE | Noted: 2024-11-06

## 2024-11-08 ENCOUNTER — OFFICE VISIT (OUTPATIENT)
Facility: CLINIC | Age: 59
End: 2024-11-08

## 2024-11-08 VITALS
SYSTOLIC BLOOD PRESSURE: 108 MMHG | OXYGEN SATURATION: 97 % | TEMPERATURE: 98.5 F | BODY MASS INDEX: 21.86 KG/M2 | HEART RATE: 65 BPM | HEIGHT: 65 IN | RESPIRATION RATE: 16 BRPM | DIASTOLIC BLOOD PRESSURE: 74 MMHG | WEIGHT: 131.2 LBS

## 2024-11-08 DIAGNOSIS — E78.2 MIXED HYPERLIPIDEMIA: ICD-10-CM

## 2024-11-08 DIAGNOSIS — J45.20 MILD INTERMITTENT ASTHMA WITHOUT COMPLICATION: Primary | ICD-10-CM

## 2024-11-08 DIAGNOSIS — Z72.0 TOBACCO ABUSE: ICD-10-CM

## 2024-11-08 DIAGNOSIS — F41.0 SEVERE ANXIETY WITH PANIC: ICD-10-CM

## 2024-11-08 DIAGNOSIS — K21.9 GERD WITHOUT ESOPHAGITIS: ICD-10-CM

## 2024-11-08 DIAGNOSIS — Z00.00 WELCOME TO MEDICARE PREVENTIVE VISIT: ICD-10-CM

## 2024-11-08 DIAGNOSIS — E55.9 VITAMIN D DEFICIENCY: ICD-10-CM

## 2024-11-08 DIAGNOSIS — Z13.39 ALCOHOL SCREENING: ICD-10-CM

## 2024-11-08 LAB — NONINV COLON CA DNA+OCC BLD SCRN STL QL: NEGATIVE

## 2024-11-08 RX ORDER — ALBUTEROL SULFATE 90 UG/1
2 INHALANT RESPIRATORY (INHALATION) EVERY 4 HOURS PRN
Qty: 6.7 G | Refills: 5 | Status: SHIPPED | OUTPATIENT
Start: 2024-11-08

## 2024-11-08 RX ORDER — MELOXICAM 15 MG/1
15 TABLET ORAL DAILY
Qty: 30 TABLET | Refills: 3 | Status: SHIPPED | OUTPATIENT
Start: 2024-11-08

## 2024-11-08 RX ORDER — DOXYCYCLINE 100 MG/1
100 CAPSULE ORAL DAILY
Qty: 30 CAPSULE | Refills: 2 | Status: SHIPPED | OUTPATIENT
Start: 2024-11-08

## 2024-11-08 RX ORDER — PRAVASTATIN SODIUM 40 MG
TABLET ORAL
Qty: 90 TABLET | Refills: 3 | Status: SHIPPED | OUTPATIENT
Start: 2024-11-08

## 2024-11-08 RX ORDER — AZITHROMYCIN 250 MG/1
TABLET, FILM COATED ORAL
Qty: 6 TABLET | Refills: 0 | Status: SHIPPED | OUTPATIENT
Start: 2024-11-08 | End: 2024-11-18

## 2024-11-08 RX ORDER — VALACYCLOVIR HYDROCHLORIDE 1 G/1
1000 TABLET, FILM COATED ORAL 3 TIMES DAILY
Qty: 21 TABLET | Refills: 0 | Status: SHIPPED | OUTPATIENT
Start: 2024-11-08

## 2024-11-08 ASSESSMENT — PATIENT HEALTH QUESTIONNAIRE - PHQ9
10. IF YOU CHECKED OFF ANY PROBLEMS, HOW DIFFICULT HAVE THESE PROBLEMS MADE IT FOR YOU TO DO YOUR WORK, TAKE CARE OF THINGS AT HOME, OR GET ALONG WITH OTHER PEOPLE: NOT DIFFICULT AT ALL
7. TROUBLE CONCENTRATING ON THINGS, SUCH AS READING THE NEWSPAPER OR WATCHING TELEVISION: NOT AT ALL
SUM OF ALL RESPONSES TO PHQ QUESTIONS 1-9: 0
5. POOR APPETITE OR OVEREATING: NOT AT ALL
SUM OF ALL RESPONSES TO PHQ9 QUESTIONS 1 & 2: 0
SUM OF ALL RESPONSES TO PHQ QUESTIONS 1-9: 0
6. FEELING BAD ABOUT YOURSELF - OR THAT YOU ARE A FAILURE OR HAVE LET YOURSELF OR YOUR FAMILY DOWN: NOT AT ALL
SUM OF ALL RESPONSES TO PHQ QUESTIONS 1-9: 0
SUM OF ALL RESPONSES TO PHQ QUESTIONS 1-9: 0
8. MOVING OR SPEAKING SO SLOWLY THAT OTHER PEOPLE COULD HAVE NOTICED. OR THE OPPOSITE, BEING SO FIGETY OR RESTLESS THAT YOU HAVE BEEN MOVING AROUND A LOT MORE THAN USUAL: NOT AT ALL
2. FEELING DOWN, DEPRESSED OR HOPELESS: NOT AT ALL
9. THOUGHTS THAT YOU WOULD BE BETTER OFF DEAD, OR OF HURTING YOURSELF: NOT AT ALL
4. FEELING TIRED OR HAVING LITTLE ENERGY: NOT AT ALL
3. TROUBLE FALLING OR STAYING ASLEEP: NOT AT ALL
1. LITTLE INTEREST OR PLEASURE IN DOING THINGS: NOT AT ALL

## 2024-11-08 ASSESSMENT — LIFESTYLE VARIABLES
HOW OFTEN DO YOU HAVE A DRINK CONTAINING ALCOHOL: MONTHLY OR LESS
HOW MANY STANDARD DRINKS CONTAINING ALCOHOL DO YOU HAVE ON A TYPICAL DAY: 1 OR 2

## 2024-11-08 NOTE — PROGRESS NOTES
PROGRESS NOTES    This is a \"Welcome to Medicare\" Initial Preventive Physical Examination (IPPE)    I have reviewed the patient's medical history in detail and updated the computerized patient record.     She presents for a welcome to Medicare initial annual examination and screening questionnaire.  She is accompanied by her  today.    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  She is taking her medications trying to follow her diet try and remain physically active.  She currently notes no chest pain, shortness of breath or cardiac respiratory complaints.  She notes no current GI or  complaints.  She notes no headaches, dizziness or neurologic complaints.  She has no current active arthritic complaints and there are no other complaints on complete review of systems.    Depression risk factor summary:      Patient Health Questionnaire (PHQ-9)   Over the last 2 weeks, how often have you been bothered by any of the following problems?  Little interest or pleasure in doing things?  No problems with this  Feeling down, depressed, or hopeless?   No problems with this  Trouble falling or staying asleep, or sleeping too much?  No difficulty with this  Feeling tired or having little energy?   Not an issue  Poor appetite or overeating?   Appetite is good  Feeling bad about yourself - or that you are a failure or have let yourself or your family down?   Does not have this problem  Trouble concentrating on things, such as reading the newspaper or watching television?   No difficulty concentrating  Moving or speaking so slowly that other people could have noticed? Or the opposite - being so fidgety or restless that you have been moving around a lot more than usual?  Difficulty with this  Thoughts that you would be better

## 2024-11-08 NOTE — PROGRESS NOTES
Yuli Kessler is a 59 y.o. female     Chief Complaint   Patient presents with    Medicare AWV       /74 (Site: Left Upper Arm, Position: Sitting, Cuff Size: Large Adult)   Pulse 65   Temp 98.5 °F (36.9 °C) (Oral)   Resp 16   Ht 1.638 m (5' 4.5\")   Wt 59.5 kg (131 lb 3.2 oz)   SpO2 97%   BMI 22.17 kg/m²     Health Maintenance Due   Topic Date Due    Pneumococcal 0-64 years Vaccine (1 of 2 - PCV) Never done    HIV screen  Never done    Hepatitis B vaccine (1 of 3 - 19+ 3-dose series) Never done    DTaP/Tdap/Td vaccine (1 - Tdap) Never done    Cervical cancer screen  Never done    Shingles vaccine (1 of 2) Never done    Annual Wellness Visit (Medicare Advantage)  Never done    Flu vaccine (1) Never done    COVID-19 Vaccine (3 - 2023-24 season) 09/01/2024         \"Have you been to the ER, urgent care clinic since your last visit?  Hospitalized since your last visit?\"    NO    “Have you seen or consulted any other health care providers outside of [facility] since your last visit?”    NO        “Have you had a pap smear?”    NO    No cervical cancer screening on file

## 2024-11-09 LAB
25(OH)D3 SERPL-MCNC: 30.5 NG/ML (ref 30–100)
ALBUMIN SERPL-MCNC: 4 G/DL (ref 3.5–5)
ALBUMIN/GLOB SERPL: 1.1 (ref 1.1–2.2)
ALP SERPL-CCNC: 77 U/L (ref 45–117)
ALT SERPL-CCNC: 21 U/L (ref 12–78)
ANION GAP SERPL CALC-SCNC: 4 MMOL/L (ref 2–12)
APPEARANCE UR: CLEAR
AST SERPL-CCNC: 18 U/L (ref 15–37)
BACTERIA URNS QL MICRO: NEGATIVE /HPF
BASOPHILS # BLD: 0.1 K/UL (ref 0–0.1)
BASOPHILS NFR BLD: 1 % (ref 0–1)
BILIRUB SERPL-MCNC: 0.5 MG/DL (ref 0.2–1)
BILIRUB UR QL: NEGATIVE
BUN SERPL-MCNC: 9 MG/DL (ref 6–20)
BUN/CREAT SERPL: 14 (ref 12–20)
CALCIUM SERPL-MCNC: 9.3 MG/DL (ref 8.5–10.1)
CHLORIDE SERPL-SCNC: 105 MMOL/L (ref 97–108)
CHOLEST SERPL-MCNC: 233 MG/DL
CK SERPL-CCNC: 148 U/L (ref 26–192)
CO2 SERPL-SCNC: 27 MMOL/L (ref 21–32)
COLOR UR: NORMAL
CREAT SERPL-MCNC: 0.64 MG/DL (ref 0.55–1.02)
DIFFERENTIAL METHOD BLD: NORMAL
EOSINOPHIL # BLD: 0.1 K/UL (ref 0–0.4)
EOSINOPHIL NFR BLD: 2 % (ref 0–7)
EPITH CASTS URNS QL MICRO: NORMAL /LPF
ERYTHROCYTE [DISTWIDTH] IN BLOOD BY AUTOMATED COUNT: 12.7 % (ref 11.5–14.5)
GLOBULIN SER CALC-MCNC: 3.5 G/DL (ref 2–4)
GLUCOSE SERPL-MCNC: 93 MG/DL (ref 65–100)
GLUCOSE UR STRIP.AUTO-MCNC: NEGATIVE MG/DL
HCT VFR BLD AUTO: 44.7 % (ref 35–47)
HDLC SERPL-MCNC: 69 MG/DL
HDLC SERPL: 3.4 (ref 0–5)
HGB BLD-MCNC: 14.8 G/DL (ref 11.5–16)
HGB UR QL STRIP: NEGATIVE
HYALINE CASTS URNS QL MICRO: NORMAL /LPF (ref 0–5)
IMM GRANULOCYTES # BLD AUTO: 0 K/UL (ref 0–0.04)
IMM GRANULOCYTES NFR BLD AUTO: 0 % (ref 0–0.5)
KETONES UR QL STRIP.AUTO: NEGATIVE MG/DL
LDLC SERPL CALC-MCNC: 149 MG/DL (ref 0–100)
LEUKOCYTE ESTERASE UR QL STRIP.AUTO: NEGATIVE
LYMPHOCYTES # BLD: 1.8 K/UL (ref 0.8–3.5)
LYMPHOCYTES NFR BLD: 32 % (ref 12–49)
MCH RBC QN AUTO: 32 PG (ref 26–34)
MCHC RBC AUTO-ENTMCNC: 33.1 G/DL (ref 30–36.5)
MCV RBC AUTO: 96.8 FL (ref 80–99)
MONOCYTES # BLD: 0.6 K/UL (ref 0–1)
MONOCYTES NFR BLD: 10 % (ref 5–13)
NEUTS SEG # BLD: 3 K/UL (ref 1.8–8)
NEUTS SEG NFR BLD: 55 % (ref 32–75)
NITRITE UR QL STRIP.AUTO: NEGATIVE
NRBC # BLD: 0 K/UL (ref 0–0.01)
NRBC BLD-RTO: 0 PER 100 WBC
PH UR STRIP: 6 (ref 5–8)
PLATELET # BLD AUTO: 206 K/UL (ref 150–400)
PMV BLD AUTO: 11 FL (ref 8.9–12.9)
POTASSIUM SERPL-SCNC: 4.7 MMOL/L (ref 3.5–5.1)
PROT SERPL-MCNC: 7.5 G/DL (ref 6.4–8.2)
PROT UR STRIP-MCNC: NEGATIVE MG/DL
RBC # BLD AUTO: 4.62 M/UL (ref 3.8–5.2)
RBC #/AREA URNS HPF: NORMAL /HPF (ref 0–5)
SODIUM SERPL-SCNC: 136 MMOL/L (ref 136–145)
SP GR UR REFRACTOMETRY: 1.01 (ref 1–1.03)
T4 FREE SERPL-MCNC: 1.1 NG/DL (ref 0.8–1.5)
TRIGL SERPL-MCNC: 75 MG/DL
TSH SERPL DL<=0.05 MIU/L-ACNC: 0.69 UIU/ML (ref 0.36–3.74)
UROBILINOGEN UR QL STRIP.AUTO: 0.2 EU/DL (ref 0.2–1)
VLDLC SERPL CALC-MCNC: 15 MG/DL
WBC # BLD AUTO: 5.6 K/UL (ref 3.6–11)
WBC URNS QL MICRO: NORMAL /HPF (ref 0–4)

## 2024-12-06 PROBLEM — Z00.00 WELCOME TO MEDICARE PREVENTIVE VISIT: Status: RESOLVED | Noted: 2023-05-31 | Resolved: 2024-12-06

## 2025-03-07 ENCOUNTER — HOSPITAL ENCOUNTER (OUTPATIENT)
Facility: HOSPITAL | Age: 60
Discharge: HOME | End: 2025-03-10
Payer: MEDICARE

## 2025-03-07 VITALS — BODY MASS INDEX: 21.86 KG/M2 | WEIGHT: 131.2 LBS | HEIGHT: 65 IN

## 2025-03-07 DIAGNOSIS — Z12.31 SCREENING MAMMOGRAM FOR BREAST CANCER: ICD-10-CM

## 2025-03-07 PROCEDURE — 77063 BREAST TOMOSYNTHESIS BI: CPT

## 2025-05-06 SDOH — ECONOMIC STABILITY: FOOD INSECURITY: WITHIN THE PAST 12 MONTHS, THE FOOD YOU BOUGHT JUST DIDN'T LAST AND YOU DIDN'T HAVE MONEY TO GET MORE.: NEVER TRUE

## 2025-05-06 SDOH — ECONOMIC STABILITY: INCOME INSECURITY: IN THE LAST 12 MONTHS, WAS THERE A TIME WHEN YOU WERE NOT ABLE TO PAY THE MORTGAGE OR RENT ON TIME?: PATIENT DECLINED

## 2025-05-06 SDOH — ECONOMIC STABILITY: FOOD INSECURITY: WITHIN THE PAST 12 MONTHS, YOU WORRIED THAT YOUR FOOD WOULD RUN OUT BEFORE YOU GOT MONEY TO BUY MORE.: NEVER TRUE

## 2025-05-06 SDOH — ECONOMIC STABILITY: TRANSPORTATION INSECURITY
IN THE PAST 12 MONTHS, HAS LACK OF TRANSPORTATION KEPT YOU FROM MEETINGS, WORK, OR FROM GETTING THINGS NEEDED FOR DAILY LIVING?: PATIENT DECLINED

## 2025-05-06 SDOH — ECONOMIC STABILITY: TRANSPORTATION INSECURITY
IN THE PAST 12 MONTHS, HAS THE LACK OF TRANSPORTATION KEPT YOU FROM MEDICAL APPOINTMENTS OR FROM GETTING MEDICATIONS?: PATIENT DECLINED

## 2025-05-09 ENCOUNTER — OFFICE VISIT (OUTPATIENT)
Facility: CLINIC | Age: 60
End: 2025-05-09

## 2025-05-09 VITALS
BODY MASS INDEX: 20.8 KG/M2 | HEART RATE: 70 BPM | WEIGHT: 123.1 LBS | SYSTOLIC BLOOD PRESSURE: 99 MMHG | TEMPERATURE: 98.9 F | OXYGEN SATURATION: 96 % | DIASTOLIC BLOOD PRESSURE: 65 MMHG

## 2025-05-09 DIAGNOSIS — Z13.39 ALCOHOL SCREENING: ICD-10-CM

## 2025-05-09 DIAGNOSIS — Z72.0 TOBACCO ABUSE: ICD-10-CM

## 2025-05-09 DIAGNOSIS — Z00.00 INITIAL MEDICARE ANNUAL WELLNESS VISIT: ICD-10-CM

## 2025-05-09 DIAGNOSIS — J45.20 MILD INTERMITTENT ASTHMA WITHOUT COMPLICATION: Primary | ICD-10-CM

## 2025-05-09 DIAGNOSIS — M47.812 SPONDYLOSIS OF CERVICAL REGION WITHOUT MYELOPATHY OR RADICULOPATHY: ICD-10-CM

## 2025-05-09 DIAGNOSIS — K21.9 GERD WITHOUT ESOPHAGITIS: ICD-10-CM

## 2025-05-09 DIAGNOSIS — E55.9 VITAMIN D DEFICIENCY: ICD-10-CM

## 2025-05-09 DIAGNOSIS — E78.2 MIXED HYPERLIPIDEMIA: ICD-10-CM

## 2025-05-09 DIAGNOSIS — F41.0 SEVERE ANXIETY WITH PANIC: ICD-10-CM

## 2025-05-09 LAB
25(OH)D3 SERPL-MCNC: 42.1 NG/ML (ref 30–100)
ALBUMIN SERPL-MCNC: 4.1 G/DL (ref 3.5–5)
ALBUMIN/GLOB SERPL: 1.2 (ref 1.1–2.2)
ALP SERPL-CCNC: 83 U/L (ref 45–117)
ALT SERPL-CCNC: 25 U/L (ref 12–78)
ANION GAP SERPL CALC-SCNC: 5 MMOL/L (ref 2–12)
APPEARANCE UR: CLEAR
AST SERPL-CCNC: 28 U/L (ref 15–37)
BACTERIA URNS QL MICRO: NEGATIVE /HPF
BASOPHILS # BLD: 0.06 K/UL (ref 0–0.1)
BASOPHILS NFR BLD: 1.1 % (ref 0–1)
BILIRUB SERPL-MCNC: 0.7 MG/DL (ref 0.2–1)
BILIRUB UR QL: NEGATIVE
BUN SERPL-MCNC: 10 MG/DL (ref 6–20)
BUN/CREAT SERPL: 14 (ref 12–20)
CALCIUM SERPL-MCNC: 9.3 MG/DL (ref 8.5–10.1)
CHLORIDE SERPL-SCNC: 105 MMOL/L (ref 97–108)
CHOLEST SERPL-MCNC: 204 MG/DL
CK SERPL-CCNC: 168 U/L (ref 26–192)
CO2 SERPL-SCNC: 27 MMOL/L (ref 21–32)
COLOR UR: ABNORMAL
CREAT SERPL-MCNC: 0.74 MG/DL (ref 0.55–1.02)
DIFFERENTIAL METHOD BLD: ABNORMAL
EOSINOPHIL # BLD: 0.18 K/UL (ref 0–0.4)
EOSINOPHIL NFR BLD: 3.4 % (ref 0–7)
EPITH CASTS URNS QL MICRO: ABNORMAL /LPF
ERYTHROCYTE [DISTWIDTH] IN BLOOD BY AUTOMATED COUNT: 12.9 % (ref 11.5–14.5)
GLOBULIN SER CALC-MCNC: 3.4 G/DL (ref 2–4)
GLUCOSE SERPL-MCNC: 85 MG/DL (ref 65–100)
GLUCOSE UR STRIP.AUTO-MCNC: NEGATIVE MG/DL
HCT VFR BLD AUTO: 45.6 % (ref 35–47)
HDLC SERPL-MCNC: 70 MG/DL
HDLC SERPL: 2.9 (ref 0–5)
HGB BLD-MCNC: 15.6 G/DL (ref 11.5–16)
HGB UR QL STRIP: NEGATIVE
HYALINE CASTS URNS QL MICRO: ABNORMAL /LPF (ref 0–5)
IMM GRANULOCYTES # BLD AUTO: 0.01 K/UL (ref 0–0.04)
IMM GRANULOCYTES NFR BLD AUTO: 0.2 % (ref 0–0.5)
KETONES UR QL STRIP.AUTO: ABNORMAL MG/DL
LDLC SERPL CALC-MCNC: 116.4 MG/DL (ref 0–100)
LEUKOCYTE ESTERASE UR QL STRIP.AUTO: NEGATIVE
LYMPHOCYTES # BLD: 1.55 K/UL (ref 0.8–3.5)
LYMPHOCYTES NFR BLD: 28.9 % (ref 12–49)
MCH RBC QN AUTO: 32.6 PG (ref 26–34)
MCHC RBC AUTO-ENTMCNC: 34.2 G/DL (ref 30–36.5)
MCV RBC AUTO: 95.4 FL (ref 80–99)
MONOCYTES # BLD: 0.6 K/UL (ref 0–1)
MONOCYTES NFR BLD: 11.2 % (ref 5–13)
NEUTS SEG # BLD: 2.96 K/UL (ref 1.8–8)
NEUTS SEG NFR BLD: 55.2 % (ref 32–75)
NITRITE UR QL STRIP.AUTO: NEGATIVE
NRBC # BLD: 0 K/UL (ref 0–0.01)
NRBC BLD-RTO: 0 PER 100 WBC
PH UR STRIP: 5.5 (ref 5–8)
PLATELET # BLD AUTO: 198 K/UL (ref 150–400)
PMV BLD AUTO: 11.6 FL (ref 8.9–12.9)
POTASSIUM SERPL-SCNC: 4.8 MMOL/L (ref 3.5–5.1)
PROT SERPL-MCNC: 7.5 G/DL (ref 6.4–8.2)
PROT UR STRIP-MCNC: NEGATIVE MG/DL
RBC # BLD AUTO: 4.78 M/UL (ref 3.8–5.2)
RBC #/AREA URNS HPF: ABNORMAL /HPF (ref 0–5)
SODIUM SERPL-SCNC: 137 MMOL/L (ref 136–145)
SP GR UR REFRACTOMETRY: 1.02 (ref 1–1.03)
T4 FREE SERPL-MCNC: 1.4 NG/DL (ref 0.8–1.5)
TRIGL SERPL-MCNC: 88 MG/DL
TSH SERPL DL<=0.05 MIU/L-ACNC: 1.79 UIU/ML (ref 0.36–3.74)
UROBILINOGEN UR QL STRIP.AUTO: 1 EU/DL (ref 0.2–1)
VLDLC SERPL CALC-MCNC: 17.6 MG/DL
WBC # BLD AUTO: 5.4 K/UL (ref 3.6–11)
WBC URNS QL MICRO: ABNORMAL /HPF (ref 0–4)

## 2025-05-09 RX ORDER — AZITHROMYCIN 250 MG/1
TABLET, FILM COATED ORAL
Qty: 6 TABLET | Refills: 0 | Status: SHIPPED | OUTPATIENT
Start: 2025-05-09 | End: 2025-05-19

## 2025-05-09 RX ORDER — CLONAZEPAM 0.5 MG/1
0.5 TABLET ORAL NIGHTLY PRN
COMMUNITY

## 2025-05-09 ASSESSMENT — PATIENT HEALTH QUESTIONNAIRE - PHQ9
1. LITTLE INTEREST OR PLEASURE IN DOING THINGS: NOT AT ALL
SUM OF ALL RESPONSES TO PHQ QUESTIONS 1-9: 0
2. FEELING DOWN, DEPRESSED OR HOPELESS: NOT AT ALL

## 2025-05-09 ASSESSMENT — LIFESTYLE VARIABLES
HOW MANY STANDARD DRINKS CONTAINING ALCOHOL DO YOU HAVE ON A TYPICAL DAY: 1 OR 2
HOW OFTEN DO YOU HAVE A DRINK CONTAINING ALCOHOL: 2-3 TIMES A WEEK

## 2025-05-09 NOTE — PROGRESS NOTES
Have you been to the ER, urgent care clinic since your last visit?  Hospitalized since your last visit?   NO    Have you seen or consulted any other health care providers outside our system since your last visit?   NO     “Have you had a pap smear?”    NO    No cervical cancer screening on file           
    Frequency of Binge Drinking: Never        Functional Ability and Level of Safety:     Positive Risk Factor Screenings with Interventions:     Cognitive:   Clock Drawing Test (CDT): (!) Abnormal  Words recalled: 2 Words Recalled  Total Score: (!) 2  Total Score Interpretation: Abnormal Mini-Cog      Drug Use:   Substance and Sexual Activity   Drug Use Yes    Types: Prescription     Interventions:  Patient declined any further intervention or treatment        General HRA Questions:  Select all that apply: (!) Stress         Vision Screen:  Do you have difficulty driving, watching TV, or doing any of your daily activities because of your eyesight?: No  Have you had an eye exam within the past year?: (!) No    Safety:  Do you have non-slip mats or non-slip surfaces or shower bars or grab bars in your shower or bathtub?: (!) No      Tobacco Use:    Tobacco Use      Smoking status: Every Day        Packs/day: 0.50        Years: 0.5 packs/day for 6.4 years (3.2 ttl pk-yrs)        Types: Cigarettes        Start date: 2019        Passive exposure: Current      Smokeless tobacco: Never     Interventions:  Discussed absolute need to discontinue smoking and ways to stop including use of Chantix, Wellbutrin, nicotine gum and nicotine patches.  Discussed complications of continued smoking include increased cardiovascular risk, increased risk of COPD, increased risk of lung cancer, head neck cancer, bladder cancer, esophageal cancer and other cancers.  5 minutes spent on this discussion today.    Tobacco Use Counseling: Patient was counseled on tobacco cessation. Based upon patient's motivation to change her behavior, the following plan was agreed upon: willpower. Educational materials regarding tobacco cessation were provided. I spent 5 minutes counseling patient.       LDCT Screening: Discussed with patient the benefits and harms of screening, follow-up diagnostic testing, over-diagnosis, false positive rate, and total

## 2025-05-09 NOTE — PATIENT INSTRUCTIONS
trans fats. They increase your risk of heart disease by raising cholesterol levels.     Limit the amount of solid fat--butter, margarine, and shortening--you eat. Use olive, peanut, or canola oil when you cook. Bake, broil, and steam foods instead of frying them.     Eat a variety of fruit and vegetables every day. Dark green, deep orange, red, or yellow fruits and vegetables are especially good for you. Examples include spinach, carrots, peaches, and berries.     Foods high in fiber can reduce your cholesterol and provide important vitamins and minerals. High-fiber foods include whole-grain cereals and breads, oatmeal, beans, brown rice, citrus fruits, and apples.     Eat lean proteins. Heart-healthy proteins include seafood, lean meats and poultry, eggs, beans, peas, nuts, seeds, and soy products.     Limit drinks and foods with added sugar. These include candy, desserts, and soda pop.   Heart-healthy lifestyle    If your doctor recommends it, get more exercise. For many people, walking is a good choice. Or you may want to swim, bike, or do other activities. Bit by bit, increase the time you're active every day. Try for at least 30 minutes on most days of the week.     Try to quit or cut back on using tobacco and other nicotine products. This includes smoking and vaping. If you need help quitting, talk to your doctor about stop-smoking programs and medicines. These can increase your chances of quitting for good. Quitting is one of the most important things you can do to protect your heart. It is never too late to quit. Try to avoid secondhand smoke too.     Stay at a weight that's healthy for you. Talk to your doctor if you need help losing weight.     Try to get 7 to 9 hours of sleep each night.     Limit alcohol to 2 drinks a day for men and 1 drink a day for women. Too much alcohol can cause health problems.     Manage other health problems such as diabetes, high blood pressure, and high cholesterol. If you

## 2025-05-20 ENCOUNTER — RESULTS FOLLOW-UP (OUTPATIENT)
Facility: CLINIC | Age: 60
End: 2025-05-20

## 2025-05-21 NOTE — TELEPHONE ENCOUNTER
Cholesterol has improved and all the labs are stable so continue same.  Letter generated to patient regarding.